# Patient Record
Sex: FEMALE | Race: WHITE | NOT HISPANIC OR LATINO | Employment: FULL TIME | URBAN - METROPOLITAN AREA
[De-identification: names, ages, dates, MRNs, and addresses within clinical notes are randomized per-mention and may not be internally consistent; named-entity substitution may affect disease eponyms.]

---

## 2022-04-18 ENCOUNTER — OFFICE VISIT (OUTPATIENT)
Dept: FAMILY MEDICINE CLINIC | Facility: CLINIC | Age: 22
End: 2022-04-18

## 2022-04-18 ENCOUNTER — LAB (OUTPATIENT)
Dept: LAB | Facility: HOSPITAL | Age: 22
End: 2022-04-18

## 2022-04-18 VITALS
HEART RATE: 114 BPM | SYSTOLIC BLOOD PRESSURE: 132 MMHG | HEIGHT: 64 IN | DIASTOLIC BLOOD PRESSURE: 84 MMHG | RESPIRATION RATE: 14 BRPM | OXYGEN SATURATION: 98 % | BODY MASS INDEX: 27.39 KG/M2 | WEIGHT: 160.4 LBS | TEMPERATURE: 98.4 F

## 2022-04-18 DIAGNOSIS — N92.6 ABNORMAL MENSES: ICD-10-CM

## 2022-04-18 DIAGNOSIS — Z00.00 ANNUAL PHYSICAL EXAM: Primary | ICD-10-CM

## 2022-04-18 DIAGNOSIS — R35.0 URINE FREQUENCY: ICD-10-CM

## 2022-04-18 DIAGNOSIS — D68.51 FACTOR 5 LEIDEN MUTATION, HETEROZYGOUS: ICD-10-CM

## 2022-04-18 LAB
25(OH)D3 SERPL-MCNC: 11.6 NG/ML (ref 30–100)
ALBUMIN SERPL-MCNC: 4.8 G/DL (ref 3.5–5.2)
ALBUMIN/GLOB SERPL: 1.7 G/DL
ALP SERPL-CCNC: 79 U/L (ref 39–117)
ALT SERPL W P-5'-P-CCNC: 18 U/L (ref 1–33)
ANION GAP SERPL CALCULATED.3IONS-SCNC: 13.5 MMOL/L (ref 5–15)
AST SERPL-CCNC: 23 U/L (ref 1–32)
B-HCG UR QL: NEGATIVE
BILIRUB BLD-MCNC: NEGATIVE MG/DL
BILIRUB SERPL-MCNC: 0.5 MG/DL (ref 0–1.2)
BUN SERPL-MCNC: 10 MG/DL (ref 6–20)
BUN/CREAT SERPL: 12.5 (ref 7–25)
CALCIUM SPEC-SCNC: 10 MG/DL (ref 8.6–10.5)
CHLORIDE SERPL-SCNC: 104 MMOL/L (ref 98–107)
CHOLEST SERPL-MCNC: 200 MG/DL (ref 0–200)
CLARITY, POC: CLEAR
CO2 SERPL-SCNC: 22.5 MMOL/L (ref 22–29)
COLOR UR: YELLOW
CREAT SERPL-MCNC: 0.8 MG/DL (ref 0.57–1)
DEPRECATED RDW RBC AUTO: 39.5 FL (ref 37–54)
EGFRCR SERPLBLD CKD-EPI 2021: 109 ML/MIN/1.73
ERYTHROCYTE [DISTWIDTH] IN BLOOD BY AUTOMATED COUNT: 12.2 % (ref 12.3–15.4)
EXPIRATION DATE: NORMAL
EXPIRATION DATE: NORMAL
GLOBULIN UR ELPH-MCNC: 2.9 GM/DL
GLUCOSE SERPL-MCNC: 90 MG/DL (ref 65–99)
GLUCOSE UR STRIP-MCNC: NEGATIVE MG/DL
HBA1C MFR BLD: 5 % (ref 4.8–5.6)
HCT VFR BLD AUTO: 44.4 % (ref 34–46.6)
HCV AB SER DONR QL: NORMAL
HDLC SERPL-MCNC: 49 MG/DL (ref 40–60)
HGB BLD-MCNC: 14.9 G/DL (ref 12–15.9)
HIV1+2 AB SER QL: NORMAL
INTERNAL NEGATIVE CONTROL: NORMAL
INTERNAL POSITIVE CONTROL: NORMAL
KETONES UR QL: NEGATIVE
LDLC SERPL CALC-MCNC: 133 MG/DL (ref 0–100)
LDLC/HDLC SERPL: 2.67 {RATIO}
LEUKOCYTE EST, POC: NEGATIVE
Lab: NORMAL
Lab: NORMAL
MCH RBC QN AUTO: 29.7 PG (ref 26.6–33)
MCHC RBC AUTO-ENTMCNC: 33.6 G/DL (ref 31.5–35.7)
MCV RBC AUTO: 88.6 FL (ref 79–97)
NITRITE UR-MCNC: NEGATIVE MG/ML
PH UR: 6 [PH] (ref 5–8)
PLATELET # BLD AUTO: 260 10*3/MM3 (ref 140–450)
PMV BLD AUTO: 10.2 FL (ref 6–12)
POTASSIUM SERPL-SCNC: 4.8 MMOL/L (ref 3.5–5.2)
PROT SERPL-MCNC: 7.7 G/DL (ref 6–8.5)
PROT UR STRIP-MCNC: NEGATIVE MG/DL
RBC # BLD AUTO: 5.01 10*6/MM3 (ref 3.77–5.28)
RBC # UR STRIP: NEGATIVE /UL
RPR SER QL: NORMAL
SODIUM SERPL-SCNC: 140 MMOL/L (ref 136–145)
SP GR UR: 1.03 (ref 1–1.03)
TRIGL SERPL-MCNC: 101 MG/DL (ref 0–150)
TSH SERPL DL<=0.05 MIU/L-ACNC: 2.83 UIU/ML (ref 0.27–4.2)
UROBILINOGEN UR QL: NORMAL
VIT B12 BLD-MCNC: 352 PG/ML (ref 211–946)
VLDLC SERPL-MCNC: 18 MG/DL (ref 5–40)
WBC NRBC COR # BLD: 6.22 10*3/MM3 (ref 3.4–10.8)

## 2022-04-18 PROCEDURE — 82306 VITAMIN D 25 HYDROXY: CPT

## 2022-04-18 PROCEDURE — 86706 HEP B SURFACE ANTIBODY: CPT

## 2022-04-18 PROCEDURE — 86592 SYPHILIS TEST NON-TREP QUAL: CPT

## 2022-04-18 PROCEDURE — 86704 HEP B CORE ANTIBODY TOTAL: CPT

## 2022-04-18 PROCEDURE — 82607 VITAMIN B-12: CPT

## 2022-04-18 PROCEDURE — 80074 ACUTE HEPATITIS PANEL: CPT

## 2022-04-18 PROCEDURE — G0432 EIA HIV-1/HIV-2 SCREEN: HCPCS

## 2022-04-18 PROCEDURE — 87350 HEPATITIS BE AG IA: CPT

## 2022-04-18 PROCEDURE — 99385 PREV VISIT NEW AGE 18-39: CPT | Performed by: STUDENT IN AN ORGANIZED HEALTH CARE EDUCATION/TRAINING PROGRAM

## 2022-04-18 PROCEDURE — 2014F MENTAL STATUS ASSESS: CPT | Performed by: STUDENT IN AN ORGANIZED HEALTH CARE EDUCATION/TRAINING PROGRAM

## 2022-04-18 PROCEDURE — 3008F BODY MASS INDEX DOCD: CPT | Performed by: STUDENT IN AN ORGANIZED HEALTH CARE EDUCATION/TRAINING PROGRAM

## 2022-04-18 PROCEDURE — 81025 URINE PREGNANCY TEST: CPT | Performed by: STUDENT IN AN ORGANIZED HEALTH CARE EDUCATION/TRAINING PROGRAM

## 2022-04-18 PROCEDURE — 84443 ASSAY THYROID STIM HORMONE: CPT

## 2022-04-18 PROCEDURE — 83036 HEMOGLOBIN GLYCOSYLATED A1C: CPT

## 2022-04-18 PROCEDURE — 80053 COMPREHEN METABOLIC PANEL: CPT

## 2022-04-18 PROCEDURE — 86707 HEPATITIS BE ANTIBODY: CPT

## 2022-04-18 PROCEDURE — 85027 COMPLETE CBC AUTOMATED: CPT

## 2022-04-18 PROCEDURE — 80061 LIPID PANEL: CPT

## 2022-04-18 RX ORDER — SERTRALINE HYDROCHLORIDE 25 MG/1
25 TABLET, FILM COATED ORAL DAILY
Qty: 30 TABLET | Refills: 1 | Status: SHIPPED | OUTPATIENT
Start: 2022-04-18 | End: 2022-07-08 | Stop reason: ALTCHOICE

## 2022-04-18 NOTE — ASSESSMENT & PLAN NOTE
Annual exam  Gardisil: she is unsure if she has had. She will let us know.   Std screening she agrees to.   She has had her covid vaccine.   Counseled on diet and exercise including limited sweets and sugars to focus on lean meat and vegetables. Counseled on 50 minutes of moderate exercise 3 times per week.   Recommend eye and dental exam.

## 2022-04-18 NOTE — PROGRESS NOTES
"     New Patient Office Visit      Patient Name: Huyen Interiano  : 2000   MRN: [id number]     Chief Complaint:  Menstrual Problem (Menstrual cycles are irregular. Last month is was a short cycle and light and this month was brown and light and spotting. Doesn't have a period every month. Periods have been abnormal since she started. Establishing care. ) and Annual Exam (Been fasting. Interested in doing blood work today. )     History of Present Illness:       Says she has a history of a clotting disorder. She says her mother is on blood thinner. Factor 5 heterozygous      She says last month her period was very light compared to normal as her periods are very heavy. She says this month her period was short as well. Last period started  on the . She did not have any cramping at the time. She says she doesn't think she could be pregnant. She says her period \"skips a lot\" sometimes going two months without it. Says sometimes she doesn't eat in the day and sometimes she She has a gyn apt .  She does have acne. No abnormal hair growth aside from belly hair which she states has \"always been present\".    Annual exam  Gardisil: she is unsure if she has had. She will let us know.   Std screening she agrees to.   She has had her covid vaccine.   Counseled on diet and exercise including limited sweets and sugars to focus on lean meat and vegetables. Counseled on 50 minutes of moderate exercise 3 times per week.   Recommend eye and dental exam.     Anxiety  No suicidal homicidal thoughts. Has had depression in high school but says this has greatly improved. She says she over thinks and has difficulty interacting with others. Eating habits are affected. Has a hard time falling asleep due to worry. Says she worries about everything. She has not been on anxiety medication in the past but alcira like to be.       Subjective        Past Medical History:   Diagnosis Date   • Blood disorder    • Depression  " "  • Menstrual abnormality        History reviewed. No pertinent surgical history.    Family History   Problem Relation Age of Onset   • Cancer Mother    • Diabetes Mother    • Depression Mother    • Seizures Mother    • Schizophrenia Father    • Schizophrenia Brother        Social History     Socioeconomic History   • Marital status: Single   Tobacco Use   • Smoking status: Never Smoker   • Smokeless tobacco: Never Used   Vaping Use   • Vaping Use: Never used   Substance and Sexual Activity   • Drug use: Never   • Sexual activity: Yes     Partners: Male     Birth control/protection: None        No current outpatient medications on file.    Allergies   Allergen Reactions   • Penicillins Hives       Objective     Physical Exam:  Vitals:    04/18/22 0951   BP: 132/84   Pulse: 114   Resp: 14   Temp: 98.4 °F (36.9 °C)   SpO2: 98%   Weight: 72.8 kg (160 lb 6.4 oz)   Height: 162.6 cm (64\")      Body mass index is 27.53 kg/m².     Physical Exam  Constitutional:       General: She is not in acute distress.     Appearance: Normal appearance.   HENT:      Head: Normocephalic and atraumatic.   Eyes:      Extraocular Movements: Extraocular movements intact.   Cardiovascular:      Rate and Rhythm: Normal rate and regular rhythm.      Heart sounds: No murmur heard.  Pulmonary:      Effort: Pulmonary effort is normal. No respiratory distress.      Breath sounds: Normal breath sounds.   Abdominal:      General: Abdomen is flat. Bowel sounds are normal.      Palpations: Abdomen is soft.      Tenderness: There is no abdominal tenderness. There is no guarding or rebound.   Genitourinary:     Comments: Pelvic exam done with kris teressa in the room. No rashes seen. Genitalia appears normal. Speculum exam done to visualize cervix which appears normal. Patient tolerated without issue. Oneswab obtained.   Musculoskeletal:         General: No swelling.      Cervical back: Normal range of motion.   Skin:     Findings: No rash.   Neurological: "      General: No focal deficit present.      Mental Status: She is alert.   Psychiatric:         Mood and Affect: Mood normal.              Assessment / Plan      Assessment/Plan:   Diagnoses and all orders for this visit:    1. Annual physical exam (Primary)  Assessment & Plan:    Annual exam  Gardisil: she is unsure if she has had. She will let us know.   Std screening she agrees to.   She has had her covid vaccine.   Counseled on diet and exercise including limited sweets and sugars to focus on lean meat and vegetables. Counseled on 50 minutes of moderate exercise 3 times per week.   Recommend eye and dental exam.   Counseled on barrier contraception use.         2. Abnormal menses  -     POC Pregnancy, Urine  -     OneSwab - Kit, Vagina; Future  -     CBC (No Diff); Future  -     Comprehensive Metabolic Panel; Future  -     Hemoglobin A1c; Future  -     TSH Rfx On Abnormal To Free T4; Future  -     Hepatitis C Antibody; Future  -     HIV-1 / O / 2 Ag / Antibody 4th Generation; Future  -     Vitamin B12; Future  -     Vitamin D 25 Hydroxy; Future  -     Lipid Panel; Future  -     Hepatitis B Virus Profile; Future  -     RPR; Future    3. Factor 5 Leiden mutation, heterozygous (HCC)  -     Ambulatory Referral to Hematology    4. Anxiety  Start zoloft.   Zoloft can be associated with a bleeding risk, risk of fractures in those >50 years old, low sodium, glaucoma, sexual dysfunction, suicidal thoughts and worsening depression. It cannot be stopped abruptly. Someone with history of qt prolongation,seizure disorder, alcoholism should not take. It should not be used in pregnancy. The patient understands these risks.          Return in about 1 month (around 5/18/2022).       Diana Dorado D.O.  [facility] [place]

## 2022-04-19 ENCOUNTER — DOCUMENTATION (OUTPATIENT)
Dept: FAMILY MEDICINE CLINIC | Facility: CLINIC | Age: 22
End: 2022-04-19

## 2022-04-19 DIAGNOSIS — F39 MOOD DISORDER: Primary | ICD-10-CM

## 2022-04-19 LAB
HBV CORE AB SERPL QL IA: NEGATIVE
HBV CORE IGM SERPL QL IA: NEGATIVE
HBV E AB SERPL QL IA: NEGATIVE
HBV E AG SERPL QL IA: NEGATIVE
HBV SURFACE AB SER QL: REACTIVE
HBV SURFACE AG SERPL QL IA: NEGATIVE

## 2022-04-19 RX ORDER — ERGOCALCIFEROL 1.25 MG/1
50000 CAPSULE ORAL WEEKLY
Qty: 8 CAPSULE | Refills: 0 | Status: SHIPPED | OUTPATIENT
Start: 2022-04-19 | End: 2022-07-08

## 2022-04-20 ENCOUNTER — CONSULT (OUTPATIENT)
Dept: ONCOLOGY | Facility: CLINIC | Age: 22
End: 2022-04-20

## 2022-04-20 VITALS
RESPIRATION RATE: 16 BRPM | WEIGHT: 163 LBS | BODY MASS INDEX: 27.83 KG/M2 | OXYGEN SATURATION: 98 % | SYSTOLIC BLOOD PRESSURE: 131 MMHG | HEIGHT: 64 IN | HEART RATE: 83 BPM | TEMPERATURE: 97.8 F | DIASTOLIC BLOOD PRESSURE: 87 MMHG

## 2022-04-20 DIAGNOSIS — D68.51 FACTOR V LEIDEN CARRIER: Primary | ICD-10-CM

## 2022-04-20 PROCEDURE — 99204 OFFICE O/P NEW MOD 45 MIN: CPT | Performed by: INTERNAL MEDICINE

## 2022-04-20 NOTE — PROGRESS NOTES
ID: 21 y.o. year old female from [facility] [id number]    PCP: Diana Dorado DO    REFERRING PHYSICIAN: Diana Dorado DO    Reason for Consultation: Factor V Leiden heterozygous carrier    Dear Dr. Dorado    It is a pleasure to meet Ms. Interiano today.  She is a very pleasant 21-year-old young lady who presents today for consultation because she is a factor V Leiden heterozygous carrier.  She was tested due to her mother having multiple clots and being on indefinite anticoagulation.  She otherwise seems to be in reasonable health.  She has some issues with her cycles.  But otherwise no chronic medical issues.  She is relatively active.  She has never had a thromboembolic event.      Past Medical History:   Diagnosis Date   • Blood disorder    • Depression    • Menstrual abnormality        History reviewed. No pertinent surgical history.    Social History     Socioeconomic History   • Marital status: Single   Tobacco Use   • Smoking status: Never Smoker   • Smokeless tobacco: Never Used   Vaping Use   • Vaping Use: Never used   Substance and Sexual Activity   • Drug use: Never   • Sexual activity: Yes     Partners: Male     Birth control/protection: None       Family History   Problem Relation Age of Onset   • Cancer Mother    • Diabetes Mother    • Depression Mother    • Seizures Mother    • Schizophrenia Father    • Schizophrenia Brother        Review of Systems:    16 point review of systems was performed and reviewed and scanned into the EMR    Review of Systems - Oncology      Current Outpatient Medications:   •  sertraline (Zoloft) 25 MG tablet, Take 1 tablet by mouth Daily., Disp: 30 tablet, Rfl: 1  •  vitamin D (ERGOCALCIFEROL) 1.25 MG (13467 UT) capsule capsule, Take 1 capsule by mouth 1 (One) Time Per Week., Disp: 8 capsule, Rfl: 0    Pain Medications             sertraline (Zoloft) 25 MG tablet Take 1 tablet by mouth Daily.           Allergies   Allergen Reactions   • Penicillins Hives         ECOG score: 0            Objective     Vitals:    04/20/22 1041   BP: 131/87   Pulse: 83   Resp: 16   Temp: 97.8 °F (36.6 °C)   SpO2: 98%     Body mass index is 27.98 kg/m².  Body surface area is 1.79 meters squared.        04/20/22  1041   Weight: 73.9 kg (163 lb)     Pain Score    04/20/22 1041   PainSc: 0-No pain          Physical Exam    General: well appearing, in no acute distress  HEENT: sclera anicteric, oropharynx clear, neck is supple  Lymphatics: no cervical, supraclavicular, or axillary adenopathy  Cardiovascular: regular rate and rhythm, no murmurs, rubs or gallops  Lungs: clear to auscultation bilaterally  Abdomen: soft, nontender, nondistended.  No palpable organomegaly  Extremities: no lower extremity edema  Skin: no rashes, lesions, bruising, or petechiae  Msk:  Shows no weakness of the large muscle groups  Psych: Mood is stable        Lab Results   Component Value Date    GLUCOSE 90 04/18/2022    BUN 10 04/18/2022    CREATININE 0.80 04/18/2022     04/18/2022    K 4.8 04/18/2022     04/18/2022    CO2 22.5 04/18/2022    CALCIUM 10.0 04/18/2022    PROTEINTOT 7.7 04/18/2022    ALBUMIN 4.80 04/18/2022    BILITOT 0.5 04/18/2022    ALKPHOS 79 04/18/2022    AST 23 04/18/2022    ALT 18 04/18/2022       Lab Results   Component Value Date    HGB 14.9 04/18/2022    HCT 44.4 04/18/2022    MCV 88.6 04/18/2022     04/18/2022    WBC 6.22 04/18/2022             Assessment/Plan      1.  Factor V Leiden heterozygous carrier.  Patients who are carriers generally do not require prophylactic anticoagulation in the setting of never having a thrombogenic event.  However there are times that we would consider routine thromboprophylaxis like with a severe acute medical illness or surgeries.  We also recommend nonestrogen containing birth control method.  IUDs are reasonable.  Occasionally I have placed patients on low-dose aspirin if they have other comorbidities such as obesity or chronic illnesses.  In this situation  she is a very healthy 21-year-old young lady and I do not see a need for that.        Thank you for allowing me to participate in the care of this patient.    Yours sincerely,    Nkechi Ramirez MD  [facility]  Hematology and Oncology         No orders of the defined types were placed in this encounter.

## 2022-04-21 ENCOUNTER — PATIENT ROUNDING (BHMG ONLY) (OUTPATIENT)
Dept: FAMILY MEDICINE CLINIC | Facility: CLINIC | Age: 22
End: 2022-04-21

## 2022-04-28 ENCOUNTER — OFFICE VISIT (OUTPATIENT)
Dept: FAMILY MEDICINE CLINIC | Facility: CLINIC | Age: 22
End: 2022-04-28

## 2022-04-28 ENCOUNTER — LAB (OUTPATIENT)
Dept: LAB | Facility: HOSPITAL | Age: 22
End: 2022-04-28

## 2022-04-28 VITALS
OXYGEN SATURATION: 99 % | RESPIRATION RATE: 18 BRPM | WEIGHT: 162 LBS | BODY MASS INDEX: 27.66 KG/M2 | HEART RATE: 90 BPM | HEIGHT: 64 IN | TEMPERATURE: 98.6 F | SYSTOLIC BLOOD PRESSURE: 120 MMHG | DIASTOLIC BLOOD PRESSURE: 80 MMHG

## 2022-04-28 DIAGNOSIS — Z32.00 POSSIBLE PREGNANCY: ICD-10-CM

## 2022-04-28 DIAGNOSIS — Z3A.01 LESS THAN 8 WEEKS GESTATION OF PREGNANCY: Primary | ICD-10-CM

## 2022-04-28 LAB
B-HCG UR QL: NEGATIVE
EXPIRATION DATE: NORMAL
HCG SERPL QL: NEGATIVE
INTERNAL NEGATIVE CONTROL: NORMAL
INTERNAL POSITIVE CONTROL: NORMAL
Lab: NORMAL

## 2022-04-28 PROCEDURE — 99214 OFFICE O/P EST MOD 30 MIN: CPT | Performed by: STUDENT IN AN ORGANIZED HEALTH CARE EDUCATION/TRAINING PROGRAM

## 2022-04-28 PROCEDURE — 84703 CHORIONIC GONADOTROPIN ASSAY: CPT | Performed by: STUDENT IN AN ORGANIZED HEALTH CARE EDUCATION/TRAINING PROGRAM

## 2022-04-28 PROCEDURE — 36415 COLL VENOUS BLD VENIPUNCTURE: CPT | Performed by: STUDENT IN AN ORGANIZED HEALTH CARE EDUCATION/TRAINING PROGRAM

## 2022-04-28 PROCEDURE — 81025 URINE PREGNANCY TEST: CPT | Performed by: STUDENT IN AN ORGANIZED HEALTH CARE EDUCATION/TRAINING PROGRAM

## 2022-04-28 NOTE — PROGRESS NOTES
"Chief Complaint  Possible Pregnancy    History of Present Illness    She says on the 3rd of this month she had spotting and has not had period since then. She says at home test appeared positive. Her apt with obgyn is on the 6th. She denies any fever belly pain. Using condoms inconsistently, says she is unsure if she wants to be pregnant.     She says she has noticed zoloft has helped. She has apt with psychiatry. No side effects.    She has started vitamin d.       The following portions of the patient's history were reviewed and updated as appropriate: allergies, current medications, past family history, past medical history, past social history, past surgical history, and problem list.    OBJECTIVE:  /80   Pulse 90   Temp 98.6 °F (37 °C)   Resp 18   Ht 162.6 cm (64\")   Wt 73.5 kg (162 lb)   SpO2 99%   BMI 27.81 kg/m²       Physical Exam  Constitutional:       General: She is not in acute distress.     Appearance: Normal appearance.   HENT:      Head: Normocephalic and atraumatic.   Eyes:      Extraocular Movements: Extraocular movements intact.   Cardiovascular:      Rate and Rhythm: Normal rate and regular rhythm.      Heart sounds: No murmur heard.  Pulmonary:      Effort: Pulmonary effort is normal. No respiratory distress.      Breath sounds: Normal breath sounds.   Abdominal:      General: Abdomen is flat. Bowel sounds are normal. There is no distension.      Palpations: Abdomen is soft.      Tenderness: There is no abdominal tenderness. There is no guarding or rebound.   Musculoskeletal:         General: No swelling.      Cervical back: Normal range of motion.   Skin:     Findings: No rash.   Neurological:      General: No focal deficit present.      Mental Status: She is alert.   Psychiatric:         Mood and Affect: Mood normal.                    Assessment and Plan   Diagnoses and all orders for this visit:    1 Possible pregnancy  2 anxiety  -     hCG, Serum, Qualitative; Future  - Advised " consistent condom use to avoid pregnancy if unwanted and to discuss non hormonal birth control with gyn if pregnancy test negative.   - Advised daily prenatal vitamin  - Discussed zoloft in pregnancy can be associated with adverse fetal outcomes such as baby irritability, gi upset, at worse seizures. Maternal anxiety/depression can also be associated with adverse fetal outcomes. She would like to stop zoloft if blood pregnancy test positive . Urine hcg negative in office today.           Return for Next scheduled follow up.       Diana Dorado D.O.  [facility] [place]

## 2022-04-29 ENCOUNTER — TELEPHONE (OUTPATIENT)
Dept: FAMILY MEDICINE CLINIC | Facility: CLINIC | Age: 22
End: 2022-04-29

## 2022-04-29 NOTE — TELEPHONE ENCOUNTER
Patient called once more returning a missed call, specified that the lab results she wanted to discuss was the Pregnancy Blood test. Please advise.

## 2022-06-13 RX ORDER — ERGOCALCIFEROL 1.25 MG/1
CAPSULE ORAL
Qty: 8 CAPSULE | Refills: 0 | OUTPATIENT
Start: 2022-06-13

## 2022-06-13 NOTE — TELEPHONE ENCOUNTER
Rx Refill Note  Requested Prescriptions     Pending Prescriptions Disp Refills   • vitamin D (ERGOCALCIFEROL) 1.25 MG (70263 UT) capsule capsule [Pharmacy Med Name: VITAMIN D2 50,000IU (ERGO) CAP RX] 8 capsule 0     Sig: TAKE 1 CAPSULE BY MOUTH 1 TIME EVERY WEEK      Last office visit with prescribing clinician: 4/28/2022      Next office visit with prescribing clinician: Visit date not found            ALEXEY SEPULVEDA MA  06/13/22, 09:44 EDT

## 2022-07-05 ENCOUNTER — HOSPITAL ENCOUNTER (EMERGENCY)
Facility: HOSPITAL | Age: 22
Discharge: HOME | End: 2022-07-05
Attending: EMERGENCY MEDICINE | Admitting: EMERGENCY MEDICINE

## 2022-07-05 ENCOUNTER — TELEPHONE (OUTPATIENT)
Dept: FAMILY MEDICINE CLINIC | Facility: CLINIC | Age: 22
End: 2022-07-05

## 2022-07-05 ENCOUNTER — APPOINTMENT (OUTPATIENT)
Dept: GENERAL RADIOLOGY | Facility: HOSPITAL | Age: 22
End: 2022-07-05

## 2022-07-05 ENCOUNTER — APPOINTMENT (OUTPATIENT)
Dept: CT IMAGING | Facility: HOSPITAL | Age: 22
End: 2022-07-05

## 2022-07-05 VITALS
WEIGHT: 150 LBS | BODY MASS INDEX: 24.99 KG/M2 | HEIGHT: 65 IN | RESPIRATION RATE: 16 BRPM | SYSTOLIC BLOOD PRESSURE: 132 MMHG | TEMPERATURE: 98.7 F | DIASTOLIC BLOOD PRESSURE: 80 MMHG | OXYGEN SATURATION: 100 % | HEART RATE: 93 BPM

## 2022-07-05 DIAGNOSIS — R07.9 CHEST PAIN, UNSPECIFIED TYPE: ICD-10-CM

## 2022-07-05 DIAGNOSIS — R10.84 GENERALIZED ABDOMINAL PAIN: Primary | ICD-10-CM

## 2022-07-05 DIAGNOSIS — K21.9 GASTROESOPHAGEAL REFLUX DISEASE WITHOUT ESOPHAGITIS: ICD-10-CM

## 2022-07-05 DIAGNOSIS — K92.1 MELENA: ICD-10-CM

## 2022-07-05 LAB
ALBUMIN SERPL-MCNC: 4.6 G/DL (ref 3.5–5.2)
ALBUMIN/GLOB SERPL: 1.7 G/DL
ALP SERPL-CCNC: 85 U/L (ref 39–117)
ALT SERPL W P-5'-P-CCNC: 15 U/L (ref 1–33)
ANION GAP SERPL CALCULATED.3IONS-SCNC: 12 MMOL/L (ref 5–15)
AST SERPL-CCNC: 25 U/L (ref 1–32)
B-HCG UR QL: NEGATIVE
BACTERIA UR QL AUTO: ABNORMAL /HPF
BASOPHILS # BLD AUTO: 0.02 10*3/MM3 (ref 0–0.2)
BASOPHILS NFR BLD AUTO: 0.3 % (ref 0–1.5)
BILIRUB SERPL-MCNC: 0.5 MG/DL (ref 0–1.2)
BILIRUB UR QL STRIP: NEGATIVE
BUN SERPL-MCNC: 8 MG/DL (ref 6–20)
BUN/CREAT SERPL: 11.4 (ref 7–25)
CALCIUM SPEC-SCNC: 9.3 MG/DL (ref 8.6–10.5)
CHLORIDE SERPL-SCNC: 105 MMOL/L (ref 98–107)
CLARITY UR: ABNORMAL
CO2 SERPL-SCNC: 25 MMOL/L (ref 22–29)
COLOR UR: YELLOW
CREAT SERPL-MCNC: 0.7 MG/DL (ref 0.57–1)
DEPRECATED RDW RBC AUTO: 38.6 FL (ref 37–54)
EGFRCR SERPLBLD CKD-EPI 2021: 127.2 ML/MIN/1.73
EOSINOPHIL # BLD AUTO: 0.08 10*3/MM3 (ref 0–0.4)
EOSINOPHIL NFR BLD AUTO: 1 % (ref 0.3–6.2)
ERYTHROCYTE [DISTWIDTH] IN BLOOD BY AUTOMATED COUNT: 12 % (ref 12.3–15.4)
EXPIRATION DATE: NORMAL
GLOBULIN UR ELPH-MCNC: 2.7 GM/DL
GLUCOSE SERPL-MCNC: 78 MG/DL (ref 65–99)
GLUCOSE UR STRIP-MCNC: NEGATIVE MG/DL
HCT VFR BLD AUTO: 41.8 % (ref 34–46.6)
HGB BLD-MCNC: 14.3 G/DL (ref 12–15.9)
HGB UR QL STRIP.AUTO: NEGATIVE
HOLD SPECIMEN: NORMAL
HYALINE CASTS UR QL AUTO: ABNORMAL /LPF
IMM GRANULOCYTES # BLD AUTO: 0.02 10*3/MM3 (ref 0–0.05)
IMM GRANULOCYTES NFR BLD AUTO: 0.3 % (ref 0–0.5)
INTERNAL NEGATIVE CONTROL: NEGATIVE
INTERNAL POSITIVE CONTROL: POSITIVE
KETONES UR QL STRIP: ABNORMAL
LEUKOCYTE ESTERASE UR QL STRIP.AUTO: NEGATIVE
LIPASE SERPL-CCNC: 31 U/L (ref 13–60)
LYMPHOCYTES # BLD AUTO: 2.84 10*3/MM3 (ref 0.7–3.1)
LYMPHOCYTES NFR BLD AUTO: 36.2 % (ref 19.6–45.3)
Lab: NORMAL
MCH RBC QN AUTO: 29.9 PG (ref 26.6–33)
MCHC RBC AUTO-ENTMCNC: 34.2 G/DL (ref 31.5–35.7)
MCV RBC AUTO: 87.4 FL (ref 79–97)
MONOCYTES # BLD AUTO: 0.66 10*3/MM3 (ref 0.1–0.9)
MONOCYTES NFR BLD AUTO: 8.4 % (ref 5–12)
NEUTROPHILS NFR BLD AUTO: 4.22 10*3/MM3 (ref 1.7–7)
NEUTROPHILS NFR BLD AUTO: 53.8 % (ref 42.7–76)
NITRITE UR QL STRIP: NEGATIVE
NRBC BLD AUTO-RTO: 0 /100 WBC (ref 0–0.2)
PH UR STRIP.AUTO: <=5 [PH] (ref 5–8)
PLATELET # BLD AUTO: 223 10*3/MM3 (ref 140–450)
PMV BLD AUTO: 9.8 FL (ref 6–12)
POTASSIUM SERPL-SCNC: 4 MMOL/L (ref 3.5–5.2)
PROT SERPL-MCNC: 7.3 G/DL (ref 6–8.5)
PROT UR QL STRIP: NEGATIVE
RBC # BLD AUTO: 4.78 10*6/MM3 (ref 3.77–5.28)
RBC # UR STRIP: ABNORMAL /HPF
REF LAB TEST METHOD: ABNORMAL
SODIUM SERPL-SCNC: 142 MMOL/L (ref 136–145)
SP GR UR STRIP: 1.03 (ref 1–1.03)
SQUAMOUS #/AREA URNS HPF: ABNORMAL /HPF
TROPONIN T SERPL-MCNC: <0.01 NG/ML (ref 0–0.03)
UROBILINOGEN UR QL STRIP: ABNORMAL
WBC # UR STRIP: ABNORMAL /HPF
WBC NRBC COR # BLD: 7.84 10*3/MM3 (ref 3.4–10.8)
WHOLE BLOOD HOLD COAG: NORMAL
WHOLE BLOOD HOLD SPECIMEN: NORMAL

## 2022-07-05 PROCEDURE — 81025 URINE PREGNANCY TEST: CPT

## 2022-07-05 PROCEDURE — 83690 ASSAY OF LIPASE: CPT

## 2022-07-05 PROCEDURE — 25010000002 ONDANSETRON PER 1 MG: Performed by: PHYSICIAN ASSISTANT

## 2022-07-05 PROCEDURE — 93005 ELECTROCARDIOGRAM TRACING: CPT | Performed by: PHYSICIAN ASSISTANT

## 2022-07-05 PROCEDURE — 71045 X-RAY EXAM CHEST 1 VIEW: CPT

## 2022-07-05 PROCEDURE — 74177 CT ABD & PELVIS W/CONTRAST: CPT

## 2022-07-05 PROCEDURE — 25010000002 IOPAMIDOL 61 % SOLUTION: Performed by: EMERGENCY MEDICINE

## 2022-07-05 PROCEDURE — 96374 THER/PROPH/DIAG INJ IV PUSH: CPT

## 2022-07-05 PROCEDURE — 80053 COMPREHEN METABOLIC PANEL: CPT

## 2022-07-05 PROCEDURE — 99283 EMERGENCY DEPT VISIT LOW MDM: CPT

## 2022-07-05 PROCEDURE — 81025 URINE PREGNANCY TEST: CPT | Performed by: EMERGENCY MEDICINE

## 2022-07-05 PROCEDURE — 84484 ASSAY OF TROPONIN QUANT: CPT | Performed by: PHYSICIAN ASSISTANT

## 2022-07-05 PROCEDURE — 81001 URINALYSIS AUTO W/SCOPE: CPT | Performed by: EMERGENCY MEDICINE

## 2022-07-05 PROCEDURE — 85025 COMPLETE CBC W/AUTO DIFF WBC: CPT

## 2022-07-05 RX ORDER — OMEPRAZOLE 20 MG/1
20 CAPSULE, DELAYED RELEASE ORAL DAILY
Qty: 14 CAPSULE | Refills: 0 | Status: SHIPPED | OUTPATIENT
Start: 2022-07-05 | End: 2023-01-16

## 2022-07-05 RX ORDER — ONDANSETRON 2 MG/ML
4 INJECTION INTRAMUSCULAR; INTRAVENOUS ONCE
Status: COMPLETED | OUTPATIENT
Start: 2022-07-05 | End: 2022-07-05

## 2022-07-05 RX ORDER — SODIUM CHLORIDE 9 MG/ML
10 INJECTION INTRAVENOUS AS NEEDED
Status: DISCONTINUED | OUTPATIENT
Start: 2022-07-05 | End: 2022-07-05 | Stop reason: HOSPADM

## 2022-07-05 RX ADMIN — SODIUM CHLORIDE 1000 ML: 9 INJECTION, SOLUTION INTRAVENOUS at 17:34

## 2022-07-05 RX ADMIN — ONDANSETRON 4 MG: 2 INJECTION INTRAMUSCULAR; INTRAVENOUS at 17:33

## 2022-07-05 RX ADMIN — IOPAMIDOL 90 ML: 612 INJECTION, SOLUTION INTRAVENOUS at 18:08

## 2022-07-05 NOTE — ED PROVIDER NOTES
Subjective   Ms. Schultz is a 22-year-old female who presents to the emergency department with complaints of diffuse abdominal pain for the past 5 days.  She has also had some intermittent midsternal chest pain off and on for about the past 3 days.  She had an episode of black stool 3 days ago that has since resolved.  No associated fever.  She has had some nausea without vomiting.  She has had diarrhea 2-3 times a day for the last few days.  No bloody stools.  The patient was seen at urgent care 3 days ago with sore throat and some ear pressure.  She was negative for COVID at that time.  Past medical history of factor V coagulopathy (not on any blood thinners).  No other known health issues.  She has had no previous surgeries.          Review of Systems   Constitutional: Negative for chills and fever.   HENT: Negative for sore throat.    Respiratory: Negative for cough and shortness of breath.    Cardiovascular: Negative for chest pain.   Gastrointestinal: Positive for blood in stool (black stool 3 days ago, now resolved), diarrhea and nausea. Negative for abdominal pain and vomiting.   Genitourinary: Negative for dysuria.   Musculoskeletal: Negative for back pain.   Skin: Negative for pallor and rash.   Allergic/Immunologic: Negative for immunocompromised state.   Neurological: Negative for weakness, light-headedness, numbness and headaches.   Hematological: Negative for adenopathy. Does not bruise/bleed easily.   Psychiatric/Behavioral: Negative for confusion.       Past Medical History:   Diagnosis Date   • Blood disorder    • Depression    • Menstrual abnormality        Allergies   Allergen Reactions   • Penicillins Hives       History reviewed. No pertinent surgical history.    Family History   Problem Relation Age of Onset   • Cancer Mother    • Diabetes Mother    • Depression Mother    • Seizures Mother    • Schizophrenia Father    • Schizophrenia Brother        Social History     Socioeconomic History   •  Marital status: Single   Tobacco Use   • Smoking status: Never Smoker   • Smokeless tobacco: Never Used   Vaping Use   • Vaping Use: Never used   Substance and Sexual Activity   • Alcohol use: Never   • Drug use: Never   • Sexual activity: Yes     Partners: Male     Birth control/protection: None           Objective   Physical Exam  Constitutional:       General: She is not in acute distress.     Appearance: She is well-developed.   HENT:      Head: Normocephalic.      Nose: Nose normal.      Mouth/Throat:      Mouth: Mucous membranes are moist.   Eyes:      Extraocular Movements: Extraocular movements intact.      Conjunctiva/sclera: Conjunctivae normal.      Pupils: Pupils are equal, round, and reactive to light.   Cardiovascular:      Rate and Rhythm: Normal rate and regular rhythm.      Heart sounds: Normal heart sounds.   Pulmonary:      Effort: Pulmonary effort is normal.      Breath sounds: Normal breath sounds. No wheezing, rhonchi or rales.   Abdominal:      General: Bowel sounds are normal.      Palpations: Abdomen is soft.      Tenderness: There is abdominal tenderness (mild, diffuse).   Musculoskeletal:         General: No tenderness. Normal range of motion.      Cervical back: Normal range of motion and neck supple.      Right lower leg: No edema.      Left lower leg: No edema.   Skin:     General: Skin is warm and dry.      Findings: No rash.   Neurological:      General: No focal deficit present.      Mental Status: She is alert and oriented to person, place, and time.   Psychiatric:         Mood and Affect: Mood normal.         Procedures           ED Course      The patient appears in no acute distress.  Labs and urinalysis were obtained.  She was sent for a CT scan of the abdomen pelvis as well as a chest x-ray.    EKG was obtained due to her intermittent chest pain.  EKG shows a IRBBB with a rate of 75 bpm.  No ischemic changes are seen.    I will plan to treat the patient with some omeprazole for  GERD like symptoms.  She will be advised to follow-up with her PCP or return to the emergency department if acutely worse.                                     MDM    Final diagnoses:   Generalized abdominal pain   Chest pain, unspecified type   Gastroesophageal reflux disease without esophagitis   Melena       ED Disposition  ED Disposition     ED Disposition   Discharge    Condition   Stable    Comment   --             Diana Dorado DO  [address]  [place] [id number]  [phone]      call for follow up    [facility]  [address] [facility] [facility] [id number] [phone]    If symptoms worsen    Viral Baptiste MD  [address]  [place] [address]  [phone]      call tomorrow to schedule a follow up         Medication List      New Prescriptions    omeprazole 20 MG capsule  Commonly known as: priLOSEC  Take 1 capsule by mouth Daily.           Where to Get Your Medications      These medications were sent to [id number] - [place], [place] - [address] [facility] [address] [facility] - [phone]  - [phone]   [address] [facility] [address]    Phone: [phone]   · omeprazole 20 MG capsule          Dejan Shaffer PA  07/05/22 4302

## 2022-07-05 NOTE — TELEPHONE ENCOUNTER
LVM for pt to call back regarding appt.     Macey would recommend pt go to the ER for evaluation. The testing she will need done will not come in quick enough, if pt has a stomach bleed.

## 2022-07-05 NOTE — DISCHARGE INSTRUCTIONS
Rest.  Omeprazole as prescribed.  Zofran as prescribed for nausea.  Call your PCP for follow-up.  Return to the emergency department if acutely worse.  Call Dr. Baptiste (gastroenterologist) for follow-up as well.

## 2022-07-06 LAB
QT INTERVAL: 388 MS
QTC INTERVAL: 433 MS

## 2022-07-08 ENCOUNTER — OFFICE VISIT (OUTPATIENT)
Dept: FAMILY MEDICINE CLINIC | Facility: CLINIC | Age: 22
End: 2022-07-08

## 2022-07-08 VITALS
DIASTOLIC BLOOD PRESSURE: 88 MMHG | HEART RATE: 92 BPM | WEIGHT: 158.6 LBS | TEMPERATURE: 98.6 F | HEIGHT: 65 IN | SYSTOLIC BLOOD PRESSURE: 130 MMHG | RESPIRATION RATE: 16 BRPM | OXYGEN SATURATION: 97 % | BODY MASS INDEX: 26.42 KG/M2

## 2022-07-08 DIAGNOSIS — K92.1 BLOODY STOOL: Primary | ICD-10-CM

## 2022-07-08 DIAGNOSIS — K92.1 BLACK STOOL: ICD-10-CM

## 2022-07-08 DIAGNOSIS — R10.13 EPIGASTRIC PAIN: ICD-10-CM

## 2022-07-08 PROBLEM — D68.51 FACTOR V LEIDEN MUTATION: Status: ACTIVE | Noted: 2022-03-28

## 2022-07-08 PROCEDURE — 99214 OFFICE O/P EST MOD 30 MIN: CPT | Performed by: STUDENT IN AN ORGANIZED HEALTH CARE EDUCATION/TRAINING PROGRAM

## 2022-07-08 RX ORDER — AZITHROMYCIN 250 MG/1
TABLET, FILM COATED ORAL
COMMUNITY
Start: 2022-07-03 | End: 2022-07-08

## 2022-07-08 RX ORDER — CITALOPRAM 10 MG/1
10 TABLET ORAL DAILY
Qty: 30 TABLET | Refills: 1 | Status: SHIPPED | OUTPATIENT
Start: 2022-07-08 | End: 2023-01-16 | Stop reason: SDUPTHER

## 2022-07-08 RX ORDER — ONDANSETRON 4 MG/1
TABLET, FILM COATED ORAL
COMMUNITY
Start: 2022-07-03 | End: 2022-07-08

## 2022-07-08 RX ORDER — LORATADINE, PSEUDOEPHEDRINE SULFATE 5; 120 MG/1; MG/1
TABLET, FILM COATED, EXTENDED RELEASE ORAL
COMMUNITY
Start: 2022-07-03 | End: 2022-07-08
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
## 2022-07-19 ENCOUNTER — OFFICE VISIT (OUTPATIENT)
Dept: FAMILY MEDICINE CLINIC | Facility: CLINIC | Age: 22
End: 2022-07-19

## 2022-07-19 VITALS
SYSTOLIC BLOOD PRESSURE: 114 MMHG | TEMPERATURE: 98 F | BODY MASS INDEX: 25.83 KG/M2 | WEIGHT: 155 LBS | DIASTOLIC BLOOD PRESSURE: 76 MMHG | OXYGEN SATURATION: 98 % | HEIGHT: 65 IN | HEART RATE: 88 BPM | RESPIRATION RATE: 20 BRPM

## 2022-07-19 DIAGNOSIS — S69.91XA HAND INJURY, RIGHT, INITIAL ENCOUNTER: Primary | ICD-10-CM

## 2022-07-19 PROCEDURE — 99213 OFFICE O/P EST LOW 20 MIN: CPT | Performed by: NURSE PRACTITIONER

## 2022-07-25 DIAGNOSIS — Z01.812 ENCOUNTER FOR PREPROCEDURE SCREENING LABORATORY TESTING FOR COVID-19: Primary | ICD-10-CM

## 2022-07-25 DIAGNOSIS — Z20.822 ENCOUNTER FOR PREPROCEDURE SCREENING LABORATORY TESTING FOR COVID-19: Primary | ICD-10-CM

## 2022-07-28 PROBLEM — S69.91XA HAND INJURY, RIGHT, INITIAL ENCOUNTER: Status: ACTIVE | Noted: 2022-07-28

## 2022-07-28 NOTE — PROGRESS NOTES
"Chief Complaint  Hand Injury    Subjective        Huyen Interiano presents to [facility]  Patient presents to clinic with complaints of left hand pain and swelling due to punching object a few days ago. She admits being able to move and bend fingers.     Hand Injury   The incident occurred 2 days ago. The injury mechanism was a direct blow. The pain is present in the left fingers and left hand. The quality of the pain is described as aching. The pain does not radiate. The pain has been improving since the incident. Pertinent negatives include no chest pain, muscle weakness, numbness or tingling. The symptoms are aggravated by movement, lifting and palpation. She has tried rest for the symptoms. The treatment provided no relief.       Objective   Vital Signs:  /76   Pulse 88   Temp 98 °F (36.7 °C)   Resp 20   Ht 165.1 cm (65\")   Wt 70.3 kg (155 lb)   SpO2 98%   BMI 25.79 kg/m²   Estimated body mass index is 25.79 kg/m² as calculated from the following:    Height as of this encounter: 165.1 cm (65\").    Weight as of this encounter: 70.3 kg (155 lb).          Physical Exam  Vitals and nursing note reviewed.   Constitutional:       General: She is not in acute distress.     Appearance: Normal appearance.   HENT:      Head: Normocephalic.      Right Ear: External ear normal.      Left Ear: External ear normal.      Nose: Nose normal.   Eyes:      Extraocular Movements: Extraocular movements intact.      Conjunctiva/sclera: Conjunctivae normal.      Pupils: Pupils are equal, round, and reactive to light.   Cardiovascular:      Rate and Rhythm: Normal rate and regular rhythm.      Heart sounds: Normal heart sounds.   Pulmonary:      Effort: Pulmonary effort is normal.      Breath sounds: Normal breath sounds.   Musculoskeletal:         General: Swelling and tenderness present.      Left hand: Swelling and tenderness present. Normal strength. Normal sensation. Normal pulse.      " Right lower leg: No edema.      Left lower leg: No edema.   Skin:     General: Skin is warm and dry.      Findings: Bruising present.   Neurological:      Mental Status: She is alert and oriented to person, place, and time.   Psychiatric:         Mood and Affect: Mood normal.         Behavior: Behavior normal.         Thought Content: Thought content normal.         Judgment: Judgment normal.        Result Review :      Data reviewed: Radiologic studies x-ray left hand          Assessment and Plan   Diagnoses and all orders for this visit:    1. Hand injury, right, initial encounter (Primary)  Assessment & Plan:  Ice/heat PRN  Tylenol/Motrin PRN  X-ray today  Elevate on pillow  RTO or call PRN    Orders:  -     XR Hand 3+ View Right (In Office)           Follow Up   Return in about 2 weeks (around 8/2/2022), or if symptoms worsen or fail to improve, for Next scheduled follow up with PCP anxiety.  Patient was given instructions and counseling regarding her condition or for health maintenance advice. Please see specific information pulled into the AVS if appropriate.

## 2022-08-02 ENCOUNTER — APPOINTMENT (OUTPATIENT)
Dept: PREADMISSION TESTING | Facility: HOSPITAL | Age: 22
End: 2022-08-02

## 2022-08-02 LAB — SARS-COV-2 RNA PNL SPEC NAA+PROBE: NOT DETECTED

## 2022-08-02 PROCEDURE — U0004 COV-19 TEST NON-CDC HGH THRU: HCPCS

## 2022-08-02 PROCEDURE — C9803 HOPD COVID-19 SPEC COLLECT: HCPCS

## 2022-08-04 ENCOUNTER — TELEPHONE (OUTPATIENT)
Dept: FAMILY MEDICINE CLINIC | Facility: CLINIC | Age: 22
End: 2022-08-04

## 2022-08-05 ENCOUNTER — OUTSIDE FACILITY SERVICE (OUTPATIENT)
Dept: GASTROENTEROLOGY | Facility: CLINIC | Age: 22
End: 2022-08-05

## 2022-08-05 PROCEDURE — 88305 TISSUE EXAM BY PATHOLOGIST: CPT | Performed by: INTERNAL MEDICINE

## 2022-08-05 PROCEDURE — 43239 EGD BIOPSY SINGLE/MULTIPLE: CPT | Performed by: INTERNAL MEDICINE

## 2022-08-06 ENCOUNTER — LAB REQUISITION (OUTPATIENT)
Dept: LAB | Facility: HOSPITAL | Age: 22
End: 2022-08-06

## 2022-08-06 DIAGNOSIS — K29.70 GASTRITIS, UNSPECIFIED, WITHOUT BLEEDING: ICD-10-CM

## 2022-08-09 LAB
CYTO UR: NORMAL
LAB AP CASE REPORT: NORMAL
LAB AP CLINICAL INFORMATION: NORMAL
PATH REPORT.FINAL DX SPEC: NORMAL
PATH REPORT.GROSS SPEC: NORMAL

## 2023-01-16 ENCOUNTER — TELEPHONE (OUTPATIENT)
Dept: FAMILY MEDICINE CLINIC | Facility: CLINIC | Age: 23
End: 2023-01-16

## 2023-01-16 ENCOUNTER — OFFICE VISIT (OUTPATIENT)
Dept: FAMILY MEDICINE CLINIC | Facility: CLINIC | Age: 23
End: 2023-01-16
Payer: MEDICAID

## 2023-01-16 VITALS
HEART RATE: 83 BPM | BODY MASS INDEX: 25.69 KG/M2 | HEIGHT: 65 IN | OXYGEN SATURATION: 98 % | TEMPERATURE: 98.2 F | SYSTOLIC BLOOD PRESSURE: 138 MMHG | WEIGHT: 154.2 LBS | DIASTOLIC BLOOD PRESSURE: 86 MMHG | RESPIRATION RATE: 14 BRPM

## 2023-01-16 DIAGNOSIS — R07.9 CHEST PAIN, UNSPECIFIED TYPE: Primary | ICD-10-CM

## 2023-01-16 DIAGNOSIS — R07.89 CHEST WALL PAIN: ICD-10-CM

## 2023-01-16 PROCEDURE — 93000 ELECTROCARDIOGRAM COMPLETE: CPT | Performed by: STUDENT IN AN ORGANIZED HEALTH CARE EDUCATION/TRAINING PROGRAM

## 2023-01-16 PROCEDURE — 99214 OFFICE O/P EST MOD 30 MIN: CPT | Performed by: STUDENT IN AN ORGANIZED HEALTH CARE EDUCATION/TRAINING PROGRAM

## 2023-01-16 RX ORDER — PANTOPRAZOLE SODIUM 40 MG/1
40 TABLET, DELAYED RELEASE ORAL 2 TIMES DAILY
Qty: 60 TABLET | Refills: 1 | Status: SHIPPED | OUTPATIENT
Start: 2023-01-16 | End: 2023-03-03 | Stop reason: ALTCHOICE

## 2023-01-16 RX ORDER — CITALOPRAM 10 MG/1
TABLET ORAL
Qty: 901 TABLET | Refills: 2 | Status: SHIPPED | OUTPATIENT
Start: 2023-01-16

## 2023-01-16 NOTE — TELEPHONE ENCOUNTER
Mame called needing clarification on the Pt Citalopram. The prescription states to take one tablet a day and then switch to two tablets a day but does not clarify when to make the switch. They also want to clarify the quantity since the quantity on the prescription is 901.

## 2023-01-16 NOTE — PROGRESS NOTES
"Chief Complaint  Chest Pain (Left side, get put back on anxiety meds)    History of Present Illness     Chest pain  She was seen in the er and diagnosed with gerd . She was given ppi but it did not help.   This has been going on for months. No palpitations. No syncope or presyncope. No fevers.   The pain is worse with laying down \"like someone is sitting on me\".   The pain is daily.   The pain is not positional.  She denies any feeling of food coming up , heartburn or other symptoms.    She says the anxiety was improved with ssri but she ran out months ago and stopped taking.      The following portions of the patient's history were reviewed and updated as appropriate: allergies, current medications, past family history, past medical history, past social history, past surgical history, and problem list.    OBJECTIVE:  /86   Pulse 83   Temp 98.2 °F (36.8 °C)   Resp 14   Ht 165.1 cm (65\")   Wt 69.9 kg (154 lb 3.2 oz)   SpO2 98%   BMI 25.66 kg/m²       Physical Exam  Constitutional:       General: She is not in acute distress.     Appearance: Normal appearance.   HENT:      Head: Normocephalic and atraumatic.   Eyes:      Extraocular Movements: Extraocular movements intact.   Cardiovascular:      Rate and Rhythm: Normal rate and regular rhythm.      Heart sounds: No murmur heard.  Pulmonary:      Effort: Pulmonary effort is normal. No respiratory distress.      Breath sounds: Normal breath sounds. No stridor. No wheezing, rhonchi or rales.   Chest:          Comments: There is pain on palpation of left side of upper chest wall  Skin:     Findings: No rash.   Neurological:      General: No focal deficit present.      Mental Status: She is alert.   Psychiatric:         Mood and Affect: Mood normal.              ECG 12 Lead    Date/Time: 1/16/2023 8:43 AM  Performed by: Diana Dorado DO  Authorized by: Diana Dorado DO   Previous ECG: no previous ECG available  Rhythm: sinus rhythm  Rate: " normal  Conduction: conduction normal  ST Segments: ST segments normal  T Waves: T waves normal  QRS axis: normal  Other: no other findings    Clinical impression: normal ECG               Assessment and Plan   Diagnoses and all orders for this visit:    1. Chest pain, unspecified type (Primary)  -     ECG 12 Lead  -     Adult Transthoracic Echo Complete W/ Cont if Necessary Per Protocol; Future    2. Chest wall pain  -     Ambulatory Referral to Physical Therapy Evaluate and treat    Other orders  -     citalopram (CeleXA) 10 MG tablet; Take one tab oral by mouth daily followed by two tabs oral daily thereafter.  Dispense: 901 tablet; Refill: 2  -     pantoprazole (Protonix) 40 MG EC tablet; Take 1 tablet by mouth 2 (Two) Times a Day.  Dispense: 60 tablet; Refill: 1        EKG, chest xray have all been non concerning. Given some chest pain with lying flat I am concerned for gerd. Will increase ppi to twice daily. Will also check echo to rule out underlying heart issue.     There is definite pain on palpation of chest wall. Advised tylenol and will refer to PT.     Call in celexa and taper back up to 20 mg as she felt this dose was more helpful.   Discussed that this medication can be associated with a bleeding risk, risk of fractures in those >50 years old, low sodium, glaucoma, sexual dysfunction, suicidal thoughts and worsening depression. It cannot be stopped abruptly. Do not take with alcohol. Do not take in pregnancy. She will let us know if she becomes pregnant. The patient understands these risks.           Return in about 3 months (around 4/16/2023).       Diana Dorado D.O.  [facility] [place]

## 2023-01-17 ENCOUNTER — PRIOR AUTHORIZATION (OUTPATIENT)
Dept: FAMILY MEDICINE CLINIC | Facility: CLINIC | Age: 23
End: 2023-01-17
Payer: MEDICAID

## 2023-01-18 ENCOUNTER — PRIOR AUTHORIZATION (OUTPATIENT)
Dept: FAMILY MEDICINE CLINIC | Facility: CLINIC | Age: 23
End: 2023-01-18
Payer: MEDICAID

## 2023-03-03 ENCOUNTER — OFFICE VISIT (OUTPATIENT)
Dept: FAMILY MEDICINE CLINIC | Facility: CLINIC | Age: 23
End: 2023-03-03
Payer: MEDICAID

## 2023-03-03 ENCOUNTER — LAB (OUTPATIENT)
Dept: LAB | Facility: HOSPITAL | Age: 23
End: 2023-03-03
Payer: MEDICAID

## 2023-03-03 ENCOUNTER — TELEPHONE (OUTPATIENT)
Dept: FAMILY MEDICINE CLINIC | Facility: CLINIC | Age: 23
End: 2023-03-03

## 2023-03-03 VITALS
HEIGHT: 64 IN | TEMPERATURE: 98 F | RESPIRATION RATE: 21 BRPM | WEIGHT: 158.4 LBS | SYSTOLIC BLOOD PRESSURE: 122 MMHG | OXYGEN SATURATION: 98 % | DIASTOLIC BLOOD PRESSURE: 84 MMHG | BODY MASS INDEX: 27.04 KG/M2 | HEART RATE: 97 BPM

## 2023-03-03 DIAGNOSIS — J02.9 SORE THROAT: ICD-10-CM

## 2023-03-03 DIAGNOSIS — J02.9 SORE THROAT: Primary | ICD-10-CM

## 2023-03-03 DIAGNOSIS — R10.9 STOMACH PAIN: ICD-10-CM

## 2023-03-03 LAB
B-HCG UR QL: NEGATIVE
EXPIRATION DATE: NORMAL
EXPIRATION DATE: NORMAL
INTERNAL CONTROL: NORMAL
INTERNAL NEGATIVE CONTROL: NORMAL
INTERNAL POSITIVE CONTROL: NORMAL
Lab: NORMAL
Lab: NORMAL
S PYO AG THROAT QL: NEGATIVE
SARS-COV-2 RNA NOSE QL NAA+PROBE: NOT DETECTED

## 2023-03-03 PROCEDURE — 87880 STREP A ASSAY W/OPTIC: CPT | Performed by: STUDENT IN AN ORGANIZED HEALTH CARE EDUCATION/TRAINING PROGRAM

## 2023-03-03 PROCEDURE — 99214 OFFICE O/P EST MOD 30 MIN: CPT | Performed by: STUDENT IN AN ORGANIZED HEALTH CARE EDUCATION/TRAINING PROGRAM

## 2023-03-03 PROCEDURE — 81025 URINE PREGNANCY TEST: CPT | Performed by: STUDENT IN AN ORGANIZED HEALTH CARE EDUCATION/TRAINING PROGRAM

## 2023-03-03 PROCEDURE — U0004 COV-19 TEST NON-CDC HGH THRU: HCPCS

## 2023-03-03 RX ORDER — LANSOPRAZOLE 15 MG/1
15 CAPSULE, DELAYED RELEASE ORAL DAILY
Qty: 30 CAPSULE | Refills: 1 | Status: SHIPPED | OUTPATIENT
Start: 2023-03-03

## 2023-03-03 RX ORDER — ONDANSETRON 4 MG/1
4 TABLET, ORALLY DISINTEGRATING ORAL
COMMUNITY
Start: 2023-02-27 | End: 2023-03-03

## 2023-03-03 NOTE — TELEPHONE ENCOUNTER
Caller: Huyen Interiano    Relationship: Self    Best call back number:     What is the best time to reach you: ANYTIME    Who are you requesting to speak with (clinical staff, provider,  specific staff member): CLINICAL STAFF    Do you know the name of the person who called: HUYEN    What was the call regarding: PATIENT WAS ASKING IF HER STREP RESULTS WERE READY    Do you require a callback: YES

## 2023-03-03 NOTE — PROGRESS NOTES
"Chief Complaint  Abdominal Pain (ER 02/27/23 SJE /Omprezole didn't help ) and Sore Throat (Hurt to swallow for th past 2 days )    History of Present Illness       Belly pain  On the left upper and lower quadrant and across the center of her abdomen   She says her reflux has worsened and she has even noticed reflux through her nose  The pain has not worsened and she is stable  Placed on twice daily ppi at last visit  Symptoms not improved.   No black/bloody stool  No vomiting recently but she does have nausea  bms are normal. No diarrhea but she has been constipated recent.  Recommended senna as needed and docusate daily.   She has been tolerating food  Her ct scan was normal at the er and labs were reassuring.     Sore throat on the left side   Began: two days ago  Sick contacts: none   Fever:none   Cough: none   Aches: none      Depression  She has done well with increased dose of celexa and would like to continue.   No s/e    The following portions of the patient's history were reviewed and updated as appropriate: allergies, current medications, past family history, past medical history, past social history, past surgical history, and problem list.    OBJECTIVE:  /84   Pulse 97   Temp 98 °F (36.7 °C) (Infrared)   Resp 21   Ht 162.6 cm (64\")   Wt 71.8 kg (158 lb 6.4 oz)   SpO2 98%   BMI 27.19 kg/m²       Physical Exam  Constitutional:       General: She is not in acute distress.     Appearance: Normal appearance.   HENT:      Head: Normocephalic and atraumatic.      Mouth/Throat:      Mouth: Mucous membranes are moist.      Pharynx: No oropharyngeal exudate or posterior oropharyngeal erythema.   Eyes:      Extraocular Movements: Extraocular movements intact.   Cardiovascular:      Rate and Rhythm: Normal rate and regular rhythm.      Heart sounds: No murmur heard.  Pulmonary:      Effort: Pulmonary effort is normal. No respiratory distress.      Breath sounds: Normal breath sounds. No stridor. No " wheezing, rhonchi or rales.   Abdominal:      General: Bowel sounds are normal.      Palpations: Abdomen is soft.      Tenderness: There is abdominal tenderness (mildly tender left middle abdomen. murphys negative. ). There is no guarding or rebound.   Skin:     Findings: No rash.   Neurological:      General: No focal deficit present.      Mental Status: She is alert.   Psychiatric:         Mood and Affect: Mood normal.                    Assessment and Plan   Diagnoses and all orders for this visit:    1. Sore throat (Primary)  -     POCT rapid strep A  -     COVID-19 PCR, [facility], NP SWAB IN [facility] VIRAL TRANSPORT MEDIA/ORAL SWISH 24-30 HR TAT - Swab, Nasopharynx; Future    2. Stomach pain  -     Ambulatory Referral to Gastroenterology  -     Ambulatory referral for Screening EGD  -     POC Pregnancy, Urine  -     H. Pylori Breath Test - Breath, Lung    Other orders  -     lansoprazole (PREVACID) 15 MG capsule; Take 1 capsule by mouth Daily.  Dispense: 30 capsule; Refill: 1      Given uncontrolled reflux failing two different ppis and twice daily dosing will send for egd and refer to gastro. Ct scan looked okay as well as labs. Will refer to gastroenterology for follow up. Advised her to go to to er for any fever worsening pain or vomiting.   Start lansoprazole as dexilant was not covered by insurance. Check h pylori.       Depression  Continue ssri. stop if pregnancy occurs.     Return in about 2 months (around 5/3/2023).       Diana Dorado D.O.  [facility] [place]

## 2023-04-05 ENCOUNTER — LAB (OUTPATIENT)
Dept: LAB | Facility: HOSPITAL | Age: 23
End: 2023-04-05
Payer: MEDICAID

## 2023-04-05 ENCOUNTER — OFFICE VISIT (OUTPATIENT)
Dept: FAMILY MEDICINE CLINIC | Facility: CLINIC | Age: 23
End: 2023-04-05
Payer: MEDICAID

## 2023-04-05 VITALS
WEIGHT: 158 LBS | SYSTOLIC BLOOD PRESSURE: 110 MMHG | BODY MASS INDEX: 27.12 KG/M2 | TEMPERATURE: 98.4 F | OXYGEN SATURATION: 98 % | HEART RATE: 110 BPM | RESPIRATION RATE: 15 BRPM | DIASTOLIC BLOOD PRESSURE: 80 MMHG

## 2023-04-05 DIAGNOSIS — K21.9 GASTROESOPHAGEAL REFLUX DISEASE WITHOUT ESOPHAGITIS: Primary | ICD-10-CM

## 2023-04-05 PROCEDURE — 83013 H PYLORI (C-13) BREATH: CPT | Performed by: STUDENT IN AN ORGANIZED HEALTH CARE EDUCATION/TRAINING PROGRAM

## 2023-04-05 PROCEDURE — 99214 OFFICE O/P EST MOD 30 MIN: CPT | Performed by: STUDENT IN AN ORGANIZED HEALTH CARE EDUCATION/TRAINING PROGRAM

## 2023-04-05 PROCEDURE — 1159F MED LIST DOCD IN RCRD: CPT | Performed by: STUDENT IN AN ORGANIZED HEALTH CARE EDUCATION/TRAINING PROGRAM

## 2023-04-05 PROCEDURE — 1160F RVW MEDS BY RX/DR IN RCRD: CPT | Performed by: STUDENT IN AN ORGANIZED HEALTH CARE EDUCATION/TRAINING PROGRAM

## 2023-04-05 RX ORDER — CYCLOBENZAPRINE HCL 10 MG
10 TABLET ORAL NIGHTLY PRN
Qty: 30 TABLET | Refills: 0 | Status: SHIPPED | OUTPATIENT
Start: 2023-04-05

## 2023-04-05 NOTE — PROGRESS NOTES
Chief Complaint  Chest Pain and Arm Pain (Pt went to ER 2 days ago at [facility])    History of Present Illness     Chest pain  She was seen in the er for this.   Chest XRAY and ekg were negative for acute findings.   She says she feels fine today but it is pain when trying to sleep. The pain is between her shoulder and her chest on the left side. This has been going on for months. It is sore with palpation.   No Wheezing no sob.       gerd   She did not  the lansoprazole  She says she feels the food coming back up and this happens mostly when laying down at night.        The following portions of the patient's history were reviewed and updated as appropriate: allergies, current medications, past family history, past medical history, past social history, past surgical history, and problem list.    OBJECTIVE:  /80   Pulse 110   Temp 98.4 °F (36.9 °C)   Resp 15   Wt 71.7 kg (158 lb)   SpO2 98%   BMI 27.12 kg/m²       Physical Exam  Constitutional:       General: She is not in acute distress.     Appearance: Normal appearance.   HENT:      Head: Normocephalic and atraumatic.   Eyes:      Extraocular Movements: Extraocular movements intact.   Cardiovascular:      Rate and Rhythm: Normal rate and regular rhythm.      Heart sounds: No murmur heard.  Pulmonary:      Effort: Pulmonary effort is normal. No respiratory distress.      Breath sounds: Normal breath sounds. No stridor. No wheezing, rhonchi or rales.   Musculoskeletal:      Comments: Normal rom sensations  strength of left upper extremity.   The pain is present on palpation of anterior and posterior shoulder.   There is pain present with flexion at the shoulder. Lift off testing negative.    Skin:     Findings: No rash.   Neurological:      General: No focal deficit present.      Mental Status: She is alert.   Psychiatric:         Mood and Affect: Mood normal.                    Assessment and Plan   Diagnoses and all orders for this visit:    1.  Gastroesophageal reflux disease without esophagitis (Primary)    Other orders  -     cyclobenzaprine (FLEXERIL) 10 MG tablet; Take 1 tablet by mouth At Night As Needed for Muscle Spasms.  Dispense: 30 tablet; Refill: 0      Chest pain  EKG and chest xray have been reassuring. Echo was ordered.   Low suspicion for ACS.   The pain today sound musculoskeletal. The pain is anterior and posterior shoulder and present on palpation.   No injury. No overuse, but she feels this is associated with sleeping on the floor. She has been staying with her mom.   Will give muscle relaxant, advised to avoid lifting. Counseled on sedation with flexeril do not take and drive take only at night. Recommend PT. She has transport issues but agrees to do online pt at home.   Recommend for her to consider imaging in the future if pain persists in her shoulder. Tylenol for pain. Avoid nsaids given uncontrolled reflux.       She feels her anxiety is controlled.         gerd  Start lansoprazole.  She was referred to gastro for this but has been having transport issues.   Advised her to go to lab for h pylori testing.   We have discussed not eating within 3 hours of bed and avoidance of triggers such as spicy foods caffeine.    Return in about 1 month (around 5/5/2023).       Diana Dorado D.O.  [facility] [place]

## 2023-04-06 LAB — UREA BREATH TEST QL: NEGATIVE

## 2023-04-07 ENCOUNTER — TELEPHONE (OUTPATIENT)
Dept: FAMILY MEDICINE CLINIC | Facility: CLINIC | Age: 23
End: 2023-04-07
Payer: MEDICAID

## 2023-09-06 ENCOUNTER — TELEPHONE (OUTPATIENT)
Dept: FAMILY MEDICINE CLINIC | Facility: CLINIC | Age: 23
End: 2023-09-06

## 2023-09-06 NOTE — TELEPHONE ENCOUNTER
PATIENT HAS CALLED REQUESTING A CALL BACK WITH STATUS OF PREVIOUS REQUEST. PATIENT HAS CALLED WITH AN UPDATED NUMBER TO CALL HER BACK.   BEST CALL BACK NUMBER [phone]

## 2023-09-06 NOTE — TELEPHONE ENCOUNTER
Caller: Huyen Interiano    Relationship: Self    Best call back number: [phone]     What medication are you requesting: SOMETHING FOR GERD    What are your current symptoms: BURNING IN THROAT, FEELS LIKE THE PATIENT  IS SWALLOWING ACID.  TUMS IS NOT WORKING    How long have you been experiencing symptoms: YES, USUALLY TUMS HELP    Have you had these symptoms before:    [x] Yes  [] No    Have you been treated for these symptoms before:   [] Yes  [x] No    If a prescription is needed, what is your preferred pharmacy and phone number:    [facility] [email or web address]                ?    Additional notes:

## 2023-09-07 ENCOUNTER — TELEPHONE (OUTPATIENT)
Dept: FAMILY MEDICINE CLINIC | Facility: CLINIC | Age: 23
End: 2023-09-07
Payer: MEDICAID

## 2023-09-07 NOTE — TELEPHONE ENCOUNTER
HUB TO READ  LVM    Per Dr. Dorado     Please have her schedule an apt with us to take a look at her throat.

## 2023-09-14 ENCOUNTER — OFFICE VISIT (OUTPATIENT)
Dept: FAMILY MEDICINE CLINIC | Facility: CLINIC | Age: 23
End: 2023-09-14
Payer: MEDICAID

## 2023-09-14 VITALS
HEART RATE: 121 BPM | HEIGHT: 64 IN | TEMPERATURE: 98.5 F | WEIGHT: 162.4 LBS | DIASTOLIC BLOOD PRESSURE: 82 MMHG | BODY MASS INDEX: 27.72 KG/M2 | RESPIRATION RATE: 21 BRPM | SYSTOLIC BLOOD PRESSURE: 114 MMHG

## 2023-09-14 DIAGNOSIS — K21.9 GASTROESOPHAGEAL REFLUX DISEASE WITHOUT ESOPHAGITIS: Primary | ICD-10-CM

## 2023-09-14 PROCEDURE — 99214 OFFICE O/P EST MOD 30 MIN: CPT | Performed by: STUDENT IN AN ORGANIZED HEALTH CARE EDUCATION/TRAINING PROGRAM

## 2023-09-14 RX ORDER — PANTOPRAZOLE SODIUM 40 MG/1
40 TABLET, DELAYED RELEASE ORAL DAILY
Qty: 90 TABLET | Refills: 0 | Status: SHIPPED | OUTPATIENT
Start: 2023-09-14

## 2023-09-14 RX ORDER — CITALOPRAM HYDROBROMIDE 10 MG/1
TABLET ORAL
Qty: 60 TABLET | Refills: 0 | Status: SHIPPED | OUTPATIENT
Start: 2023-09-14

## 2023-09-14 NOTE — PROGRESS NOTES
"Chief Complaint  Heartburn (Acid reflux has increased in severity over the last month.)    Heartburn    Reflux  She has noticed reflux being worse lately. Tums used to help but no longer.   She did not try the lansoprazole.   No fever.   No unintentional weight loss, difficulty swallowing, ARNAUD, family history of upper gi cancer, new onset over 60 years old.       Anxiety  She stopped taking the celexa. She would like to be back on this as her anxiety is not controlled.   She denies any depression    The following portions of the patient's history were reviewed and updated as appropriate: allergies, current medications, past family history, past medical history, past social history, past surgical history, and problem list.    OBJECTIVE:  /82   Pulse (!) 121   Temp 98.5 °F (36.9 °C) (Infrared)   Resp 21   Ht 162.6 cm (64\")   Wt 73.7 kg (162 lb 6.4 oz)   BMI 27.88 kg/m²       Physical Exam  Constitutional:       General: She is not in acute distress.     Appearance: Normal appearance.   HENT:      Head: Normocephalic and atraumatic.   Eyes:      Extraocular Movements: Extraocular movements intact.   Cardiovascular:      Rate and Rhythm: Normal rate and regular rhythm.      Heart sounds: No murmur heard.  Pulmonary:      Effort: Pulmonary effort is normal. No respiratory distress.      Breath sounds: Normal breath sounds. No stridor. No wheezing, rhonchi or rales.   Abdominal:      General: Bowel sounds are normal.      Palpations: Abdomen is soft.      Tenderness: There is no abdominal tenderness. There is no guarding or rebound.   Skin:     Findings: No rash.   Neurological:      General: No focal deficit present.      Mental Status: She is alert.   Psychiatric:         Mood and Affect: Mood normal.                  Assessment and Plan   There are no diagnoses linked to this encounter.  Gerd  Start on protonix  We have discussed not eating within 3 hours of bed and avoidance of triggers such as spicy foods " caffeine.  No red flags      Anxiety  Start back on celexa  Counseled to not take if pregnant  She has tolerated this in the past.     Return in about 6 weeks (around 10/26/2023) for Annual.       Diana Dorado D.O.  [facility] [place]

## 2023-09-17 ENCOUNTER — DOCUMENTATION (OUTPATIENT)
Dept: FAMILY MEDICINE CLINIC | Facility: CLINIC | Age: 23
End: 2023-09-17
Payer: MEDICAID

## 2023-09-18 ENCOUNTER — TELEPHONE (OUTPATIENT)
Dept: FAMILY MEDICINE CLINIC | Facility: CLINIC | Age: 23
End: 2023-09-18
Payer: MEDICAID

## 2023-09-18 NOTE — PROGRESS NOTES
Pt contacted call services on 9/16/23 for concern of reaction to Celexa for which she started two days prior. She felt that she was having elevated blood pressure due to the medication with separate readings of 140s systolic and 150s systolic. I advised her that this is not a typical adverse reaction to Celexa but to stop the medication and monitor her blood pressure over the next day. Then to call our office on Monday so that we could review her blood pressure readings and discuss her medications. Instructions on when to report to the ER such as shortness of breath, very elevated blood pressures, chest pain etc were given. Valerie voiced understanding of these instructions and thanked me for returning her call.

## 2023-09-19 ENCOUNTER — OFFICE VISIT (OUTPATIENT)
Dept: FAMILY MEDICINE CLINIC | Facility: CLINIC | Age: 23
End: 2023-09-19
Payer: MEDICAID

## 2023-09-19 VITALS
WEIGHT: 161 LBS | HEART RATE: 103 BPM | BODY MASS INDEX: 26.82 KG/M2 | SYSTOLIC BLOOD PRESSURE: 132 MMHG | TEMPERATURE: 98.7 F | OXYGEN SATURATION: 99 % | DIASTOLIC BLOOD PRESSURE: 78 MMHG | HEIGHT: 65 IN

## 2023-09-19 DIAGNOSIS — R03.0 ELEVATED BLOOD PRESSURE READING: Primary | ICD-10-CM

## 2023-09-19 PROCEDURE — 1160F RVW MEDS BY RX/DR IN RCRD: CPT

## 2023-09-19 PROCEDURE — 99214 OFFICE O/P EST MOD 30 MIN: CPT

## 2023-09-19 PROCEDURE — 1159F MED LIST DOCD IN RCRD: CPT

## 2023-09-19 NOTE — PROGRESS NOTES
Office Note     Name: Huyen Interiano    : 2000     MRN: [id number]     Primary Concern  Hypertension and review medication  (Provider said stop taking citalopram (CeleXA))    Subjective     Subjective     History of Present Illness:  Huyen Interiano is a 23 y.o. female who presents today to [facility] for  the following .    HPI:   Elevated blood pressure: Onset 4 days ago.  Patient experienced symptoms in review of systems.  She has been taking her blood pressure at home 3 times a day, when she first gets up, mid day, and night time.  Home blood pressure readings are as follows: SBP ranges from 114-164 and DBP ranges from 80-100The only change in her routine is that she begin Celexa 1-2 days prior to elevated blood pressure onset.  Patient previously took this medication for several months, and tolerated it fine.  She was unsure if reinitiating the medication was the cause for her elevated blood pressures and decided to stop taking the medication after speaking to the on-call provider 3 days ago.  Drinks 1-2 sodas occasionally and Coffee 3 times monthly.     Review of Systems:   Review of Systems   Constitutional:  Positive for fatigue.   Respiratory:  Negative for shortness of breath.    Cardiovascular:  Negative for chest pain and palpitations.   Gastrointestinal:  Positive for abdominal pain (Related to menses) and nausea (Related to menses).   Musculoskeletal:  Negative for back pain, neck pain and neck stiffness.   Neurological:  Positive for dizziness, weakness, light-headedness and headache (baseline.). Negative for seizures, syncope and memory problem.   Psychiatric/Behavioral:  Positive for sleep disturbance (baseline.  Gets 6 hours of sleep a night.) and depressed mood. Negative for agitation and decreased concentration. The patient is nervous/anxious.      The following portions of the patient's history were reviewed and updated as appropriate: allergies,  current medications, past family history, past medical history, past social history, past surgical history and problem list.    Past Medical History:   Past Medical History:   Diagnosis Date    Blood disorder     Depression     Menstrual abnormality        Past Surgical History: History reviewed. No pertinent surgical history.    Immunizations:   Immunization History   Administered Date(s) Administered    COVID-19 (PFIZER) Purple Cap Monovalent 03/25/2021, 04/23/2021    COVID-19 (UNSPECIFIED) 03/01/2021, 04/01/2021    DTaP 5 2002, 2002, 01/08/2003, 01/07/2004, 08/07/2006    Flu Vaccine Quad PF >36MO 10/10/2018    Hep B, Adolescent or Pediatric 2002, 2002, 06/10/2003    Hib (PRP-OMP) 2002, 2002, 01/08/2003, 10/15/2004    IPV 2002, 2002, 06/10/2003, 08/07/2006    MMR 01/07/2004, 08/07/2006    PEDS-Pneumococcal Conjugate (PCV7) 2002, 2002, 01/08/2003, 01/07/2004, 10/15/2004    Varicella 06/10/2003        Current Medications:     Current Outpatient Medications:     citalopram (CeleXA) 10 MG tablet, Take one tab oral by mouth daily followed by two tabs oral daily thereafter., Disp: 60 tablet, Rfl: 0    pantoprazole (PROTONIX) 40 MG EC tablet, Take 1 tablet by mouth Daily., Disp: 90 tablet, Rfl: 0    Allergies:   Allergies   Allergen Reactions    Penicillins Hives       Family History:   Family History   Problem Relation Age of Onset    Cancer Mother     Diabetes Mother     Depression Mother     Seizures Mother     Thyroid disease Mother     Heart block Mother     Schizophrenia Father     Schizophrenia Brother        Social History:   Social History     Socioeconomic History    Marital status: Single   Tobacco Use    Smoking status: Never    Smokeless tobacco: Never   Vaping Use    Vaping Use: Never used   Substance and Sexual Activity    Alcohol use: Never    Drug use: Never    Sexual activity: Yes     Partners: Male     Birth control/protection: None        "    Objective     Objective     Vital Signs  /78 (BP Location: Left arm, Patient Position: Sitting, Cuff Size: Adult)   Pulse 103   Temp 98.7 °F (37.1 °C) (Infrared)   Ht 165.1 cm (65\")   Wt 73 kg (161 lb)   SpO2 99%   BMI 26.79 kg/m²   Estimated body mass index is 26.79 kg/m² as calculated from the following:    Height as of this encounter: 165.1 cm (65\").    Weight as of this encounter: 73 kg (161 lb).          Physical Exam:  Physical Exam  Constitutional:       General: She is not in acute distress.     Appearance: Normal appearance. She is not ill-appearing, toxic-appearing or diaphoretic.   HENT:      Head: Normocephalic and atraumatic.      Nose: Nose normal. No congestion or rhinorrhea.      Mouth/Throat:      Mouth: Mucous membranes are moist.      Pharynx: Oropharynx is clear. No oropharyngeal exudate or posterior oropharyngeal erythema.   Eyes:      Extraocular Movements: Extraocular movements intact.      Conjunctiva/sclera: Conjunctivae normal.      Pupils: Pupils are equal, round, and reactive to light.   Neck:      Vascular: No carotid bruit.   Cardiovascular:      Rate and Rhythm: Regular rhythm. Tachycardia present.      Pulses: Normal pulses.      Heart sounds: Normal heart sounds.   Pulmonary:      Effort: Pulmonary effort is normal. No respiratory distress.   Abdominal:      General: There is no distension.      Tenderness: There is no abdominal tenderness. There is no guarding or rebound.   Musculoskeletal:      Cervical back: Normal range of motion.   Lymphadenopathy:      Cervical: No cervical adenopathy.   Skin:     General: Skin is warm.      Capillary Refill: Capillary refill takes less than 2 seconds.      Coloration: Skin is not jaundiced or pale.      Findings: No erythema or rash.   Neurological:      Mental Status: She is alert and oriented to person, place, and time.      Coordination: Coordination normal.      Gait: Gait normal.      Deep Tendon Reflexes: Reflexes normal. "   Psychiatric:         Mood and Affect: Mood normal.         Behavior: Behavior normal.         Thought Content: Thought content normal.       Procedures     Results for orders placed or performed in visit on 03/03/23   COVID-19 PCR, [facility], NP SWAB IN [facility]AL TRANSPORT MEDIA/ORAL SWISH 24-30 HR TAT - Swab, Nasopharynx    Specimen: Nasopharynx; Swab   Result Value Ref Range    SARS-CoV-2 CATALINA Not Detected Not Detected           Assessment / Plan    Assessment and Plan   Diagnoses and all orders for this visit:    1. Elevated blood pressure reading (Primary)    -Return to clinic in 1 week for blood pressure check.  -Continue monitoring blood pressure at home.  -Consider resuming Celexa after follow-up evaluation  -Patient has active orders for 2D echo that was ordered in January 2023.  She would like to do scheduled due current symptoms and family history of cardiac conditions.  -I informed the patient of worrisome symptoms.  Patient acknowledged and agrees to seek emergency treatment if symptoms develop.  Patient has no other questions at this time and is in agreement plan of care.      Follow Up   Return in about 1 week (around 9/26/2023) for Recheck BP.    Discussed possible differential diagnoses, testing, treatment, recommended non-pharmacological interventions, risks, warning signs to monitor for that would indicate need for follow-up in clinic or ER. If no improvement with these regimens or you have new or worsening symptoms follow-up. Patient verbalizes understanding and agreement with plan of care. Denies further needs or concerns.     Patient was given instructions and counseling regarding her condition or for health maintenance advice. Please see specific information pulled into the AVS if appropriate.     Carmenza Peres  [facility] [place]

## 2023-09-26 ENCOUNTER — OFFICE VISIT (OUTPATIENT)
Dept: FAMILY MEDICINE CLINIC | Facility: CLINIC | Age: 23
End: 2023-09-26
Payer: MEDICAID

## 2023-09-26 VITALS
DIASTOLIC BLOOD PRESSURE: 92 MMHG | TEMPERATURE: 98.6 F | HEART RATE: 90 BPM | OXYGEN SATURATION: 99 % | RESPIRATION RATE: 19 BRPM | HEIGHT: 65 IN | BODY MASS INDEX: 27.09 KG/M2 | WEIGHT: 162.6 LBS | SYSTOLIC BLOOD PRESSURE: 122 MMHG

## 2023-09-26 DIAGNOSIS — Z00.00 ANNUAL PHYSICAL EXAM: Primary | ICD-10-CM

## 2023-09-26 RX ORDER — PROPRANOLOL HYDROCHLORIDE 20 MG/1
TABLET ORAL
Qty: 180 TABLET | Refills: 0 | Status: SHIPPED | OUTPATIENT
Start: 2023-09-26

## 2023-09-26 RX ORDER — PROPRANOLOL HYDROCHLORIDE 20 MG/1
20 TABLET ORAL 2 TIMES DAILY PRN
Qty: 60 TABLET | Refills: 0 | Status: SHIPPED | OUTPATIENT
Start: 2023-09-26 | End: 2023-09-26

## 2023-09-26 NOTE — PROGRESS NOTES
"Chief Complaint  Blood Pressure Check (One week follow up, bp check)    History of Present Illness      Elevated blood pressure readings  She has been checking her bp at home and her bp will have some spikes up to 160s or 170s. The last few days highest has been 150.         Anxiety  She stopped the ssri to see if this contributes to htn.       The following portions of the patient's history were reviewed and updated as appropriate: allergies, current medications, past family history, past medical history, past social history, past surgical history, and problem list.    OBJECTIVE:  /92 (BP Location: Right arm, Patient Position: Sitting, Cuff Size: Adult)   Pulse 90   Temp 98.6 °F (37 °C) (Temporal)   Resp 19   Ht 165.1 cm (65\")   Wt 73.8 kg (162 lb 9.6 oz)   SpO2 99%   BMI 27.06 kg/m²       Physical Exam  Constitutional:       General: She is not in acute distress.     Appearance: Normal appearance.   HENT:      Head: Normocephalic and atraumatic.   Eyes:      Extraocular Movements: Extraocular movements intact.   Cardiovascular:      Rate and Rhythm: Normal rate and regular rhythm.      Heart sounds: No murmur heard.  Pulmonary:      Effort: Pulmonary effort is normal. No respiratory distress.      Breath sounds: Normal breath sounds. No stridor. No wheezing, rhonchi or rales.   Skin:     Findings: No rash.   Neurological:      General: No focal deficit present.      Mental Status: She is alert.   Psychiatric:         Mood and Affect: Mood normal.                  Assessment and Plan   There are no diagnoses linked to this encounter.  Anxiety  Elevated bp readings       anxiety  She prefers to continue back on lexapro rather than starting a new med. She has been referred to behavioral health.        Likely stress/anxiety contributing to elevated bp.   Will give propranolol prn for bp above 160 systolic on arm cuff (has been using wrist cuff not as accurate).   Advised her to stop the meds if " pregnant.  Counseled to stop propranolol or any dizziness or systolic below 100. Counseled on bradycardia or dizziness.     Return in about 2 weeks (around 10/10/2023).       Diana Dorado D.O.  [facility] [place]

## 2023-10-11 RX ORDER — CITALOPRAM HYDROBROMIDE 10 MG/1
TABLET ORAL
Qty: 60 TABLET | Refills: 0 | Status: SHIPPED | OUTPATIENT
Start: 2023-10-11

## 2023-10-17 ENCOUNTER — OFFICE VISIT (OUTPATIENT)
Dept: FAMILY MEDICINE CLINIC | Facility: CLINIC | Age: 23
End: 2023-10-17
Payer: MEDICAID

## 2023-10-17 VITALS
DIASTOLIC BLOOD PRESSURE: 68 MMHG | HEIGHT: 65 IN | BODY MASS INDEX: 27.02 KG/M2 | OXYGEN SATURATION: 99 % | RESPIRATION RATE: 21 BRPM | WEIGHT: 162.2 LBS | TEMPERATURE: 98.6 F | SYSTOLIC BLOOD PRESSURE: 112 MMHG | HEART RATE: 115 BPM

## 2023-10-17 DIAGNOSIS — Z34.90 PREGNANCY, UNSPECIFIED GESTATIONAL AGE: Primary | ICD-10-CM

## 2023-10-17 LAB
B-HCG UR QL: POSITIVE
EXPIRATION DATE: ABNORMAL
INTERNAL NEGATIVE CONTROL: ABNORMAL
INTERNAL POSITIVE CONTROL: ABNORMAL
Lab: ABNORMAL

## 2023-10-17 NOTE — PROGRESS NOTES
"Chief Complaint  Possible Pregnancy (Pt had positive pregnancy test last Sunday. Pt needs referral for OBGYN)    History of Present Illness      Her period was supposed to come today. No bleeding. She had a positive pregnancy on Sunday. She has already stopped the celexa and the protonix and propranolol. She has anxiety without depression. She denies any suicidal thoughts. She prefers to be off harmful meds if possible.      The following portions of the patient's history were reviewed and updated as appropriate: allergies, current medications, past family history, past medical history, past social history, past surgical history, and problem list.    OBJECTIVE:  /68   Pulse 115   Temp 98.6 °F (37 °C) (Temporal)   Resp 21   Ht 165.1 cm (65\")   Wt 73.6 kg (162 lb 3.2 oz)   SpO2 99%   BMI 26.99 kg/m²       Physical Exam  Constitutional:       General: She is not in acute distress.     Appearance: Normal appearance.   HENT:      Head: Normocephalic and atraumatic.   Eyes:      Extraocular Movements: Extraocular movements intact.   Cardiovascular:      Rate and Rhythm: Normal rate and regular rhythm.      Heart sounds: No murmur heard.  Pulmonary:      Effort: Pulmonary effort is normal. No respiratory distress.      Breath sounds: Normal breath sounds. No stridor. No wheezing, rhonchi or rales.   Abdominal:      Tenderness: There is no abdominal tenderness. There is no guarding or rebound.   Skin:     Findings: No rash.   Neurological:      General: No focal deficit present.      Mental Status: She is alert.   Psychiatric:         Mood and Affect: Mood normal.                    Assessment and Plan   Diagnoses and all orders for this visit:    1. Pregnancy, unspecified gestational age (Primary)  -     POC Pregnancy, Urine  -     Ambulatory Referral to Obstetrics / Gynecology      Elevated blood pressure readings   Bp controlled off meds.   She is taking prenatal vitamin.   Will refer to obgyn. "       gerd  She feels gerd is okay at this time. We have discussed not eating within 3 hours of bed and avoidance of triggers such as spicy foods caffeine.          Anxiety  She will consider risk benefit of ssri in pregnancy and will let me know. Discussed they are category c and what this means.     Return in about 3 months (around 1/17/2024).       Diana Dorado D.O.  [facility] [place] [facility] [place]

## 2023-10-23 ENCOUNTER — OFFICE VISIT (OUTPATIENT)
Dept: FAMILY MEDICINE CLINIC | Facility: CLINIC | Age: 23
End: 2023-10-23
Payer: MEDICAID

## 2023-10-23 VITALS
RESPIRATION RATE: 23 BRPM | OXYGEN SATURATION: 98 % | DIASTOLIC BLOOD PRESSURE: 70 MMHG | SYSTOLIC BLOOD PRESSURE: 138 MMHG | HEIGHT: 65 IN | TEMPERATURE: 98.7 F | WEIGHT: 164.6 LBS | HEART RATE: 120 BPM | BODY MASS INDEX: 27.42 KG/M2

## 2023-10-23 DIAGNOSIS — R00.0 TACHYCARDIA: Primary | ICD-10-CM

## 2023-10-23 PROCEDURE — 93000 ELECTROCARDIOGRAM COMPLETE: CPT | Performed by: STUDENT IN AN ORGANIZED HEALTH CARE EDUCATION/TRAINING PROGRAM

## 2023-10-23 PROCEDURE — 99214 OFFICE O/P EST MOD 30 MIN: CPT | Performed by: STUDENT IN AN ORGANIZED HEALTH CARE EDUCATION/TRAINING PROGRAM

## 2023-10-23 NOTE — PROGRESS NOTES
"Chief Complaint  Rapid Heart Rate (Pt has been having rapid heart rate when moving around with some dizziness. )    History of Present Illness        While resting hr is 90. When she stands up her heart rate will be 130s just to go to the restroom.   No chest pain.   She notes some dizziness when she stands up too long.   She does feel anxious all the time. She wants to be back on anxiety medication, but also does not want to be given possible risks of ssri.       The following portions of the patient's history were reviewed and updated as appropriate: allergies, current medications, past family history, past medical history, past social history, past surgical history, and problem list.    OBJECTIVE:  /70   Pulse 120   Temp 98.7 °F (37.1 °C) (Temporal)   Resp 23   Ht 165.1 cm (65\")   Wt 74.7 kg (164 lb 9.6 oz)   SpO2 98%   BMI 27.39 kg/m²       Physical Exam  Constitutional:       General: She is not in acute distress.     Appearance: Normal appearance.   HENT:      Head: Normocephalic and atraumatic.   Eyes:      Extraocular Movements: Extraocular movements intact.   Cardiovascular:      Rate and Rhythm: Normal rate and regular rhythm.      Heart sounds: No murmur heard.  Pulmonary:      Effort: Pulmonary effort is normal. No respiratory distress.      Breath sounds: Normal breath sounds. No stridor. No wheezing, rhonchi or rales.   Skin:     Findings: No rash.   Neurological:      General: No focal deficit present.      Mental Status: She is alert.   Psychiatric:         Mood and Affect: Mood normal.            ECG 12 Lead    Date/Time: 10/23/2023 4:20 PM  Performed by: Diana Dorado DO    Authorized by: Diana Dorado DO  Comparison: compared with previous ECG   Similar to previous ECG  Rhythm: sinus rhythm  Rate: normal  Conduction: conduction normal  ST Segments: ST segments normal  T Waves: T waves normal  QRS axis: normal  Other: no other findings    Clinical impression: normal ECG     "       Assessment and Plan   Diagnoses and all orders for this visit:    1. Tachycardia (Primary)  -     ECG 12 Lead  -     Holter Monitor - 48 Hour; Future  -     Adult Transthoracic Echo Complete W/ Cont if Necessary Per Protocol; Future            Check holter and echo. EKG showing sinus rhythm without tachycardia.       anxiety  We discussed possible zoloft for anxiety . She will consider. She declines counseling. Recommend deep breathing techniques.     Advised her to go to er for any chest pain, hr above 150s or syncope.     No follow-ups on file.       Diana Dorado D.O.  [facility] [place]

## 2023-11-03 ENCOUNTER — HOSPITAL ENCOUNTER (OUTPATIENT)
Dept: CARDIOLOGY | Facility: HOSPITAL | Age: 23
Discharge: HOME | End: 2023-11-03
Payer: MEDICAID

## 2023-11-03 VITALS — WEIGHT: 164.68 LBS | BODY MASS INDEX: 27.44 KG/M2 | HEIGHT: 65 IN

## 2023-11-03 DIAGNOSIS — R00.0 TACHYCARDIA: ICD-10-CM

## 2023-11-03 LAB
BH CV ECHO MEAS - AO MAX PG: 5.8 MMHG
BH CV ECHO MEAS - AO MEAN PG: 3 MMHG
BH CV ECHO MEAS - AO ROOT DIAM: 2.9 CM
BH CV ECHO MEAS - AO V2 MAX: 120 CM/SEC
BH CV ECHO MEAS - AO V2 VTI: 21.2 CM
BH CV ECHO MEAS - AVA(I,D): 1.77 CM2
BH CV ECHO MEAS - EDV(CUBED): 72 ML
BH CV ECHO MEAS - EDV(MOD-SP2): 58 ML
BH CV ECHO MEAS - EDV(MOD-SP4): 58 ML
BH CV ECHO MEAS - EF(MOD-BP): 66 %
BH CV ECHO MEAS - EF(MOD-SP2): 65.5 %
BH CV ECHO MEAS - EF(MOD-SP4): 65.5 %
BH CV ECHO MEAS - ESV(CUBED): 18.6 ML
BH CV ECHO MEAS - ESV(MOD-SP2): 20 ML
BH CV ECHO MEAS - ESV(MOD-SP4): 20 ML
BH CV ECHO MEAS - FS: 36.3 %
BH CV ECHO MEAS - IVS/LVPW: 1.2 CM
BH CV ECHO MEAS - IVSD: 0.84 CM
BH CV ECHO MEAS - LA DIMENSION: 3.3 CM
BH CV ECHO MEAS - LAT PEAK E' VEL: 15.2 CM/SEC
BH CV ECHO MEAS - LV DIASTOLIC VOL/BSA (35-75): 31.9 CM2
BH CV ECHO MEAS - LV MASS(C)D: 95.3 GRAMS
BH CV ECHO MEAS - LV MAX PG: 3.2 MMHG
BH CV ECHO MEAS - LV MEAN PG: 2 MMHG
BH CV ECHO MEAS - LV SYSTOLIC VOL/BSA (12-30): 11 CM2
BH CV ECHO MEAS - LV V1 MAX: 88.8 CM/SEC
BH CV ECHO MEAS - LV V1 VTI: 13.2 CM
BH CV ECHO MEAS - LVIDD: 4.2 CM
BH CV ECHO MEAS - LVIDS: 2.7 CM
BH CV ECHO MEAS - LVOT AREA: 2.8 CM2
BH CV ECHO MEAS - LVOT DIAM: 1.9 CM
BH CV ECHO MEAS - LVPWD: 0.7 CM
BH CV ECHO MEAS - MED PEAK E' VEL: 14.3 CM/SEC
BH CV ECHO MEAS - MV A MAX VEL: 61 CM/SEC
BH CV ECHO MEAS - MV DEC SLOPE: 411 CM/SEC2
BH CV ECHO MEAS - MV DEC TIME: 0.15 SEC
BH CV ECHO MEAS - MV E MAX VEL: 109 CM/SEC
BH CV ECHO MEAS - MV E/A: 1.79
BH CV ECHO MEAS - MV P1/2T: 74.8 MSEC
BH CV ECHO MEAS - MVA(P1/2T): 2.9 CM2
BH CV ECHO MEAS - PA ACC SLOPE: 766 CM/SEC2
BH CV ECHO MEAS - PA ACC TIME: 0.1 SEC
BH CV ECHO MEAS - RAP SYSTOLE: 8 MMHG
BH CV ECHO MEAS - SI(MOD-SP2): 20.9 ML/M2
BH CV ECHO MEAS - SI(MOD-SP4): 20.9 ML/M2
BH CV ECHO MEAS - SV(LVOT): 37.4 ML
BH CV ECHO MEAS - SV(MOD-SP2): 38 ML
BH CV ECHO MEAS - SV(MOD-SP4): 38 ML
BH CV ECHO MEAS - TAPSE (>1.6): 1.8 CM
BH CV ECHO MEASUREMENTS AVERAGE E/E' RATIO: 7.39
BH CV VAS BP LEFT ARM: NORMAL MMHG
BH CV XLRA - RV BASE: 3.2 CM
BH CV XLRA - RV LENGTH: 7.4 CM
BH CV XLRA - RV MID: 2.7 CM
BH CV XLRA - TDI S': 12.8 CM/SEC
IVRT: 56 MS
LEFT ATRIUM VOLUME INDEX: 15.4 ML/M2
LEFT ATRIUM VOLUME: 28 ML

## 2023-11-03 PROCEDURE — 93306 TTE W/DOPPLER COMPLETE: CPT

## 2023-11-21 ENCOUNTER — INITIAL PRENATAL (OUTPATIENT)
Dept: OBSTETRICS AND GYNECOLOGY | Facility: CLINIC | Age: 23
End: 2023-11-21
Payer: MEDICAID

## 2023-11-21 VITALS — BODY MASS INDEX: 27.46 KG/M2 | WEIGHT: 165 LBS | DIASTOLIC BLOOD PRESSURE: 70 MMHG | SYSTOLIC BLOOD PRESSURE: 116 MMHG

## 2023-11-21 DIAGNOSIS — O99.341 ANXIETY IN PREGNANCY IN FIRST TRIMESTER, ANTEPARTUM: ICD-10-CM

## 2023-11-21 DIAGNOSIS — R51.9 PREGNANCY HEADACHE IN FIRST TRIMESTER: ICD-10-CM

## 2023-11-21 DIAGNOSIS — Z67.91 RH NEGATIVE STATUS DURING PREGNANCY IN FIRST TRIMESTER: ICD-10-CM

## 2023-11-21 DIAGNOSIS — F41.9 ANXIETY IN PREGNANCY IN FIRST TRIMESTER, ANTEPARTUM: ICD-10-CM

## 2023-11-21 DIAGNOSIS — D68.51 FACTOR V LEIDEN CARRIER: ICD-10-CM

## 2023-11-21 DIAGNOSIS — O26.891 RH NEGATIVE STATUS DURING PREGNANCY IN FIRST TRIMESTER: ICD-10-CM

## 2023-11-21 DIAGNOSIS — Z34.81 PRENATAL CARE, SUBSEQUENT PREGNANCY IN FIRST TRIMESTER: Primary | ICD-10-CM

## 2023-11-21 DIAGNOSIS — O26.891 PREGNANCY HEADACHE IN FIRST TRIMESTER: ICD-10-CM

## 2023-11-21 DIAGNOSIS — Z88.0 PENICILLIN ALLERGY: ICD-10-CM

## 2023-11-21 LAB
AMPHET+METHAMPHET UR QL: NEGATIVE
AMPHETAMINES UR QL: NEGATIVE
BARBITURATES UR QL SCN: NEGATIVE
BENZODIAZ UR QL SCN: NEGATIVE
BUPRENORPHINE SERPL-MCNC: NEGATIVE NG/ML
C TRACH RRNA SPEC DONR QL NAA+PROBE: NEGATIVE
CANNABINOIDS SERPL QL: NEGATIVE
COCAINE UR QL: NEGATIVE
METHADONE UR QL SCN: NEGATIVE
N GONORRHOEA DNA SPEC QL NAA+PROBE: NEGATIVE
OPIATES UR QL: NEGATIVE
OXYCODONE UR QL SCN: NEGATIVE
PCP UR QL SCN: NEGATIVE
TRICYCLICS UR QL SCN: NEGATIVE

## 2023-11-21 PROCEDURE — 87086 URINE CULTURE/COLONY COUNT: CPT | Performed by: OBSTETRICS & GYNECOLOGY

## 2023-11-21 PROCEDURE — 80306 DRUG TEST PRSMV INSTRMNT: CPT | Performed by: OBSTETRICS & GYNECOLOGY

## 2023-11-21 RX ORDER — PRENATAL VIT/IRON FUM/FOLIC AC 27MG-0.8MG
1 TABLET ORAL DAILY
COMMUNITY

## 2023-11-21 RX ORDER — MAGNESIUM OXIDE 400 MG/1
400 TABLET ORAL DAILY PRN
Qty: 30 TABLET | Refills: 1 | Status: SHIPPED | OUTPATIENT
Start: 2023-11-21

## 2023-11-21 RX ORDER — ASPIRIN 81 MG/1
81 TABLET ORAL DAILY
Qty: 30 TABLET | Refills: 11 | Status: SHIPPED | OUTPATIENT
Start: 2023-11-21

## 2023-11-21 NOTE — PROGRESS NOTES
Subjective   Chief Complaint   Patient presents with    Initial Prenatal Visit       Huyen Interiano is a 23 y.o. year old .  Patient's last menstrual period was 2023 (exact date).  She presents to be seen to initiate prenatal care. She reports a history of factor V Leiden (heterozygous) with no personal prior history of VTE. Her mother does have a history of VTE in the past. She reports some headaches.     Social History    Tobacco Use      Smoking status: Never      Smokeless tobacco: Never      The following portions of the patient's history were reviewed and updated as appropriate:vital signs, allergies, current medications, past family history, past medical history, past social history, past surgical history, and problem list.    Objective   /70   Wt 74.8 kg (165 lb)   LMP 2023 (Exact Date)   BMI 27.46 kg/m²     General: well developed; well nourished  no acute distress   Skin: No suspicious lesions seen   Thyroid: normal to inspection and palpation   Heart:  Not performed.   Lungs: breathing is unlabored   Breasts:  Examined in supine position  Symmetric without masses or skin dimpling  Nipples normal without inversion, lesions or discharge  There are no palpable axillary nodes   Abdomen: soft, non-tender; no masses  no umbilical or inguinal hernias are present  no hepato-splenomegaly   Pelvis: Clinical staff was present for exam  External genitalia:  normal appearance of the external genitalia including Bartholin's and [place]'s glands.  :  urethral meatus normal;  Vaginal:  normal pink mucosa without prolapse or lesions.  Cervix:  normal appearance.  Uterus:  normal size, shape and consistency.  Adnexa:  normal bimanual exam of the adnexa.  Rectal:  digital rectal exam not performed; anus visually normal appearing.       Lab Review   No data reviewed    Imaging   Pelvic ultrasound report    Assessment & Plan   ASSESSMENT  23 y.o. year old  at 9w1d confirmed by ultrasound  today   Supervision of high risk pregnancy  Factor V Leiden heterozygous   Headaches in pregnancy  Anxiety in pregnancy (previously on celexa)    PLAN  The problem list for pregnancy was initiated today  Tests ordered today:  Orders Placed This Encounter   Procedures    Chlamydia trachomatis, Neisseria gonorrhoeae, Trichomonas vaginalis, PCR - Swab, Cervix     Standing Status:   Future     Standing Expiration Date:   12/20/2023     Order Specific Question:   Release to patient     Answer:   Routine Release [[id number]]    Urine Culture - Urine, Urine, Clean Catch     Standing Status:   Future     Standing Expiration Date:   11/20/2024     Order Specific Question:   Release to patient     Answer:   Routine Release [[id number]]    OB Panel With HIV     Standing Status:   Future     Standing Expiration Date:   11/20/2024     Order Specific Question:   Release to patient     Answer:   Routine Release [[id number]]    Hepatitis C Antibody     Standing Status:   Future     Standing Expiration Date:   11/20/2024     Order Specific Question:   Release to patient     Answer:   Routine Release [[id number]]    Urine Drug Screen - Urine, Clean Catch     Please confirm all positive UDS     Standing Status:   Future     Standing Expiration Date:   11/20/2024     Order Specific Question:   Release to patient     Answer:   Routine Release [[id number]]     Testing for GC / Chlamydia / trichomonas was done today  Genetic testing reviewed: NIPT (Panorama), carrier screening (SMA and CF), and they will consider the information and make a decision at a later date.  Information reviewed: exercise in pregnancy, nutrition in pregnancy, weight gain in pregnancy, work and travel restrictions during pregnancy, list of OTC medications acceptable in pregnancy, call coverage groups, and vaccinations in pregnancy at this [place] (flu and covid-19 booster).  Pap was done today.  If she does not receive the results of the Pap within 2 weeks  time, she  was instructed to call to find out the results.  I explained to Huyen that the recommendations for Pap smear interval in a low risk patient's has lengthened to 3 years time.  I encouraged her to be seen yearly for a full physical exam including breast and pelvic exam even during the off years when PAP's will not be performed.    Total time spent today with Huyen  was 45-59 minutes (level 4).  Of this time, > 50% was spent face-to-face time coordinating care, answering her questions and counseling regarding pathophysiology of her presenting problem along with plans for any diagnositc work-up and treatment.    Follow up: 4 week(s)     New Medications Ordered This Visit   Medications    aspirin 81 MG EC tablet     Sig: Take 1 tablet by mouth Daily.     Dispense:  30 tablet     Refill:  11    magnesium oxide (MAG-OX) 400 MG tablet     Sig: Take 1 tablet by mouth Daily As Needed (headaches).     Dispense:  30 tablet     Refill:  1       This note was electronically signed.    Daria Thompson MD  November 21, 2023

## 2023-11-22 ENCOUNTER — PATIENT ROUNDING (BHMG ONLY) (OUTPATIENT)
Dept: OBSTETRICS AND GYNECOLOGY | Facility: CLINIC | Age: 23
End: 2023-11-22
Payer: MEDICAID

## 2023-11-22 LAB — BACTERIA SPEC AEROBE CULT: NO GROWTH

## 2023-11-27 PROBLEM — Z01.419 WELL WOMAN EXAM: Status: ACTIVE | Noted: 2023-11-27

## 2023-11-27 LAB — REF LAB TEST METHOD: NORMAL

## 2023-11-29 ENCOUNTER — DOCUMENTATION (OUTPATIENT)
Dept: OBSTETRICS AND GYNECOLOGY | Facility: CLINIC | Age: 23
End: 2023-11-29
Payer: MEDICAID

## 2023-12-01 ENCOUNTER — TELEPHONE (OUTPATIENT)
Dept: OBSTETRICS AND GYNECOLOGY | Facility: CLINIC | Age: 23
End: 2023-12-01
Payer: MEDICAID

## 2023-12-01 NOTE — TELEPHONE ENCOUNTER
Caller:     Relationship:     Best call back number: [phone]    What is the best time to reach you: ANYTIME    Who are you requesting to speak with (clinical staff, provider,  specific staff member): DR MONTESINOS            PATIENT CALLED INTO OFFICE STATING SHE IS HAVING SHARP PAIN ALONG HER ABDOMIN AS WELL AS IN HER BACK. PATIENT IS 10 WEEKS PREGNANT AND WAS WANTING A CALL BACK FROM DR MONTESINOS OR A NURSE ASAP.

## 2023-12-04 NOTE — TELEPHONE ENCOUNTER
Called and spoke with patient, informed her that Dr. Thompson was agreeable to previous phone call/discussion, we went over that advisement again, she verified understanding and had no further questions at this time.

## 2023-12-04 NOTE — TELEPHONE ENCOUNTER
Called and spoke with patient.  She states that the pain was located from mid/low abdominal area right below naval and then into pelvic area.  She states that she rested and it calmed down.  She states that she did not take any Tylenol for it.  No vaginal bleeding/spotting/leakage.  Patient states she is doing okay now, the area may be a little sore still but the pain seems to have mostly passed.  Patient was given general ED precautions and encouraged to keep well hydrated.  Routing to provider for additional advisement if applicable.

## 2023-12-05 ENCOUNTER — LAB (OUTPATIENT)
Dept: LAB | Facility: HOSPITAL | Age: 23
End: 2023-12-05
Payer: MEDICAID

## 2023-12-05 LAB
EXTERNAL ABO GROUPING: ABNORMAL
EXTERNAL ANTIBODY SCREEN: ABNORMAL
EXTERNAL HEMATOCRIT: 43 %
EXTERNAL HEMOGLOBIN: 14.5 G/DL
EXTERNAL HEPATITIS B SURFACE ANTIGEN: NEGATIVE
EXTERNAL PLATELET COUNT: 225 K/ΜL
EXTERNAL RH FACTOR: NEGATIVE
EXTERNAL SYPHILIS RPR SCREEN: ABNORMAL
HCV AB S/CO SERPL IA: NON REACTIVE
HIV 1+2 AB+HIV1 P24 AG SERPL QL IA: NON REACTIVE
RUBV IGG SERPL IA-ACNC: ABNORMAL

## 2023-12-07 ENCOUNTER — TELEPHONE (OUTPATIENT)
Dept: OBSTETRICS AND GYNECOLOGY | Facility: CLINIC | Age: 23
End: 2023-12-07

## 2023-12-07 ENCOUNTER — APPOINTMENT (OUTPATIENT)
Dept: CT IMAGING | Facility: HOSPITAL | Age: 23
End: 2023-12-07
Payer: MEDICAID

## 2023-12-07 ENCOUNTER — TELEPHONE (OUTPATIENT)
Dept: FAMILY MEDICINE CLINIC | Facility: CLINIC | Age: 23
End: 2023-12-07

## 2023-12-07 ENCOUNTER — HOSPITAL ENCOUNTER (EMERGENCY)
Facility: HOSPITAL | Age: 23
Discharge: HOME | End: 2023-12-07
Attending: EMERGENCY MEDICINE
Payer: MEDICAID

## 2023-12-07 VITALS
HEART RATE: 116 BPM | BODY MASS INDEX: 27.99 KG/M2 | WEIGHT: 168 LBS | HEIGHT: 65 IN | RESPIRATION RATE: 18 BRPM | SYSTOLIC BLOOD PRESSURE: 148 MMHG | OXYGEN SATURATION: 98 % | TEMPERATURE: 98.3 F | DIASTOLIC BLOOD PRESSURE: 98 MMHG

## 2023-12-07 DIAGNOSIS — H53.149 PHOTOPHOBIA: ICD-10-CM

## 2023-12-07 DIAGNOSIS — R51.9 GENERALIZED HEADACHE: Primary | ICD-10-CM

## 2023-12-07 DIAGNOSIS — F41.9 ANXIETY AND DEPRESSION: ICD-10-CM

## 2023-12-07 DIAGNOSIS — F32.A ANXIETY AND DEPRESSION: ICD-10-CM

## 2023-12-07 DIAGNOSIS — R11.0 NAUSEA: ICD-10-CM

## 2023-12-07 DIAGNOSIS — D68.51 FACTOR V LEIDEN MUTATION: ICD-10-CM

## 2023-12-07 DIAGNOSIS — Z3A.11 11 WEEKS GESTATION OF PREGNANCY: ICD-10-CM

## 2023-12-07 LAB
FLUAV RNA RESP QL NAA+PROBE: NOT DETECTED
FLUBV RNA RESP QL NAA+PROBE: NOT DETECTED
HOLD SPECIMEN: NORMAL
HOLD SPECIMEN: NORMAL
SARS-COV-2 RNA RESP QL NAA+PROBE: NOT DETECTED
WHOLE BLOOD HOLD COAG: NORMAL
WHOLE BLOOD HOLD SPECIMEN: NORMAL

## 2023-12-07 PROCEDURE — 96374 THER/PROPH/DIAG INJ IV PUSH: CPT

## 2023-12-07 PROCEDURE — 25810000003 SODIUM CHLORIDE 0.9 % SOLUTION: Performed by: PHYSICIAN ASSISTANT

## 2023-12-07 PROCEDURE — 99284 EMERGENCY DEPT VISIT MOD MDM: CPT

## 2023-12-07 PROCEDURE — 25010000002 DIPHENHYDRAMINE PER 50 MG: Performed by: PHYSICIAN ASSISTANT

## 2023-12-07 PROCEDURE — 25010000002 METOCLOPRAMIDE PER 10 MG: Performed by: PHYSICIAN ASSISTANT

## 2023-12-07 PROCEDURE — 70450 CT HEAD/BRAIN W/O DYE: CPT

## 2023-12-07 PROCEDURE — 87636 SARSCOV2 & INF A&B AMP PRB: CPT | Performed by: PHYSICIAN ASSISTANT

## 2023-12-07 PROCEDURE — 96375 TX/PRO/DX INJ NEW DRUG ADDON: CPT

## 2023-12-07 RX ORDER — ACETAMINOPHEN 500 MG
1000 TABLET ORAL ONCE
Status: COMPLETED | OUTPATIENT
Start: 2023-12-07 | End: 2023-12-07

## 2023-12-07 RX ORDER — DIPHENHYDRAMINE HYDROCHLORIDE 50 MG/ML
25 INJECTION INTRAMUSCULAR; INTRAVENOUS ONCE
Status: COMPLETED | OUTPATIENT
Start: 2023-12-07 | End: 2023-12-07

## 2023-12-07 RX ORDER — METOCLOPRAMIDE HYDROCHLORIDE 5 MG/ML
10 INJECTION INTRAMUSCULAR; INTRAVENOUS ONCE
Status: COMPLETED | OUTPATIENT
Start: 2023-12-07 | End: 2023-12-07

## 2023-12-07 RX ADMIN — ACETAMINOPHEN 1000 MG: 500 TABLET ORAL at 21:37

## 2023-12-07 RX ADMIN — SODIUM CHLORIDE 1000 ML: 9 INJECTION, SOLUTION INTRAVENOUS at 21:36

## 2023-12-07 RX ADMIN — DIPHENHYDRAMINE HYDROCHLORIDE 25 MG: 50 INJECTION INTRAMUSCULAR; INTRAVENOUS at 21:37

## 2023-12-07 RX ADMIN — METOCLOPRAMIDE 10 MG: 5 INJECTION, SOLUTION INTRAMUSCULAR; INTRAVENOUS at 21:36

## 2023-12-07 NOTE — TELEPHONE ENCOUNTER
Please have her try pyridoxine over the counter for nausea which may also help with headache. (Vitamin b6). Did they call her to get her in with gynecology/obstetrics?

## 2023-12-07 NOTE — TELEPHONE ENCOUNTER
Pt called us back. She has seen her OB doctor.      Call to give her advice on taking vitamin b6 and let her know if there is anything else she can do for nausea.       Call pt  [phone]

## 2023-12-07 NOTE — TELEPHONE ENCOUNTER
Caller: Huyen Interiano    Relationship: Self    Best call back number: [phone]     What was the call regarding: PATIENT STATES SHE HAS BEEN HAVING A HEADACHE AND SOME NAUSEA FOR 2 DAYS NOW AND SHE IS 11 WEEKS PREGNANT AND TYLENOL HAS NOT BEEN HELPING SO SHE WOULD LIKE TO KNOW IF THERE IS SOMETHING SHE CAN DO TO HELP WITH THIS.

## 2023-12-07 NOTE — TELEPHONE ENCOUNTER
Provider: DR BAYLON    Caller: CARLOS ANTHONY    Relationship to Patient: SELF    Phone Number: [phone]    Reason for Call: PATIENT CALLED AND STATED SHE HAS BEEN UNABLE TO GET RID OF A HEADACHE AND PCP RECOMMENDED TO CALL OBGYN    When was the patient last seen: 11/21/23    When did it start: ABOUT 2 DAYS AGO    Characteristics of symptom/severity: SEVERE HEADACHE THAT WON'T GO AWAY    Timing- Is it constant or intermittent: CONSTANT    What makes it worse: TRIED SLEEPING THROUGH IT, BUT CLOSING HER EYES MAKES HER NAUSEOUS AND DIZZY    What therapies/medications have you tried: TRIED TAKING TYLENOL, BUT IT ISN'T HELPING     PATIENT'S PCP RECOMMENDED VITAMIN B6 TO HELP WITH NAUSEA AND IT MIGHT HELP WITH HEADACHE, BUT RECOMMENDED TO CALL OBGYN FIRST

## 2023-12-07 NOTE — TELEPHONE ENCOUNTER
Dr. Thompson at obstetrics would know more about nausea of pregnancy that has not responded to B6.

## 2023-12-07 NOTE — TELEPHONE ENCOUNTER
Spoke with pt and advised her to go to the ER for evaluation.  Pt verbalized understanding and had no questions at this time.

## 2023-12-08 LAB — HOLD SPECIMEN: NORMAL

## 2023-12-08 NOTE — ED PROVIDER NOTES
Subjective   History of Present Illness  This is a 23-year-old female that presents the ER with headache x 2 days.  Patient is 11 weeks pregnant.  She is  2 para 0 miscarriage 1.  She follows with Dr. Thompson, OB/GYN.  She states that she slipped on the floor at her mom's house and woke up the next morning and had a generalized headache.  She describes it as pressure with aching.  She tried Tylenol without relief.  She reports nausea as well as fatigue/malaise, and sore throat.  She reports some muscular neck pain bilaterally but denies any stiffness.  She denies fever or chills or any other URI symptoms including rhinorrhea, nasal congestion, or cough.  She denies any abdominal pain or urinary or bowel changes.  She denies vaginal bleeding or discharge.  She said that her pregnancy has been going good.  She still reports some intermittent nausea but denies any vomiting.  Past medical history is significant for anxiety/depression, factor V leiden mutation.    History provided by:  Patient  Headache  Pain location:  Generalized  Quality: pressure.  Radiates to:  L neck and R neck (muscular neck pain.)  Severity currently:  7/10  Duration:  2 days  Timing:  Constant  Progression:  Unchanged  Chronicity:  New  Similar to prior headaches: no (Pt denies any history of frequent headaches or migraines.)    Context comment:  Pt is 11 weeks pregnant.  Generalized HA, sore throat, nausea. No h/o migraines. She said she slept on the floor 2 night ago at her Mom's. HA ever since.  Relieved by:  Nothing  Worsened by:  Nothing  Ineffective treatments:  Acetaminophen  Associated symptoms: fatigue, nausea, neck pain (muscular neck pain), photophobia and sore throat    Associated symptoms: no abdominal pain, no back pain, no blurred vision, no congestion, no cough, no diarrhea, no dizziness, no drainage, no ear pain, no eye pain, no facial pain, no fever, no focal weakness, no hearing loss, no loss of balance, no myalgias, no  near-syncope, no neck stiffness, no numbness, no paresthesias, no sinus pressure, no swollen glands, no visual change, no vomiting and no weakness    Risk factors comment:  11 weeks pregnant. Pt following with Dr. Thompson.  Miscarriage 1.      Review of Systems   Constitutional:  Positive for appetite change and fatigue. Negative for fever.   HENT:  Positive for sore throat. Negative for congestion, ear pain, hearing loss, postnasal drip and sinus pressure.    Eyes:  Positive for photophobia. Negative for blurred vision, pain and visual disturbance.   Respiratory: Negative.  Negative for cough.    Cardiovascular: Negative.  Negative for chest pain and near-syncope.   Gastrointestinal:  Positive for nausea. Negative for abdominal pain, diarrhea and vomiting.   Genitourinary: Negative.  Negative for dysuria, flank pain, frequency and vaginal bleeding.        11 weeks pregnant.   Miscarriage 1   Musculoskeletal:  Positive for neck pain (muscular neck pain). Negative for back pain, myalgias and neck stiffness.   Neurological:  Positive for headaches (generalized HA; pressure. Pt rates it a 7/10 on pain scale.). Negative for dizziness, focal weakness, weakness, numbness, paresthesias and loss of balance.   All other systems reviewed and are negative.      Past Medical History:   Diagnosis Date    Anxiety     She was previously on citalopram    Blood disorder     Depression     Factor V Leiden carrier     Menstrual abnormality        Allergies   Allergen Reactions    Penicillins Hives       Past Surgical History:   Procedure Laterality Date    WISDOM TOOTH EXTRACTION         Family History   Problem Relation Age of Onset    Cancer Mother     Diabetes Mother     Depression Mother     Seizures Mother     Thyroid disease Mother     Heart block Mother     Schizophrenia Father     Schizophrenia Brother        Social History     Socioeconomic History    Marital status: Single   Tobacco Use    Smoking status: Never     Smokeless tobacco: Never   Vaping Use    Vaping Use: Never used   Substance and Sexual Activity    Alcohol use: Never    Drug use: Never    Sexual activity: Yes     Partners: Male     Birth control/protection: None           Objective   Physical Exam  Vitals and nursing note reviewed.   Constitutional:       General: She is not in acute distress.     Appearance: Normal appearance. She is not ill-appearing, toxic-appearing or diaphoretic.      Comments: Patient resting comfortably.  No acute sign of pain or distress.  Nontoxic.   HENT:      Head: Normocephalic and atraumatic.      Right Ear: Tympanic membrane normal. Tympanic membrane is not erythematous, retracted or bulging.      Left Ear: Tympanic membrane normal. Tympanic membrane is not erythematous, retracted or bulging.      Ears:      Comments: Bilateral TMs are clear     Nose: Nose normal. No congestion or rhinorrhea.      Comments: No nasal congestion or rhinorrhea     Mouth/Throat:      Mouth: Mucous membranes are moist.      Pharynx: Oropharynx is clear. No pharyngeal swelling, oropharyngeal exudate or posterior oropharyngeal erythema.      Comments: Oral mucous membranes are moist.  Posterior pharynx is not erythematous  Eyes:      Extraocular Movements: Extraocular movements intact.      Right eye: Normal extraocular motion and no nystagmus.      Left eye: Normal extraocular motion and no nystagmus.      Conjunctiva/sclera: Conjunctivae normal.      Pupils: Pupils are equal, round, and reactive to light.      Comments: Positive photophobia to both eyes.  Pupils equal round reactive to light.  Extraocular movements intact.   Neck:      Meningeal: Brudzinski's sign and Kernig's sign absent.      Comments: Tenderness to bilateral cervical myofascia.  No C-spine tenderness.  Full range of motion.  No meningeal signs.  Cardiovascular:      Rate and Rhythm: Regular rhythm. Tachycardia present. No extrasystoles are present.     Pulses: Normal pulses.       Heart sounds: Normal heart sounds.      Comments: Mild tachycardia.  No ectopy  Pulmonary:      Effort: Pulmonary effort is normal.      Breath sounds: Normal breath sounds.      Comments: Lungs are clear to auscultation bilaterally  Abdominal:      General: Bowel sounds are normal. There is no distension.      Palpations: Abdomen is soft.      Tenderness: There is no abdominal tenderness. There is no right CVA tenderness, left CVA tenderness, guarding or rebound.      Comments: Abdomen soft and nontender.  No flank or CVA tenderness   Musculoskeletal:         General: Normal range of motion.      Cervical back: Normal range of motion and neck supple. Muscular tenderness present. No pain with movement or spinous process tenderness.      Right lower leg: No edema.      Left lower leg: No edema.   Lymphadenopathy:      Cervical: No cervical adenopathy.   Skin:     General: Skin is warm and dry.   Neurological:      General: No focal deficit present.      Mental Status: She is alert and oriented to person, place, and time.      Cranial Nerves: Cranial nerves 2-12 are intact.      Sensory: Sensation is intact.      Motor: Motor function is intact.      Coordination: Coordination is intact.      Comments: Alert oriented x 3.  Neuro intact and nonfocal.         Procedures           ED Course  ED Course as of 12/07/23 2256   u Dec 07, 2023   2253 ER assessment reveals stable vital signs and essentially normal physical exam.  Patient describes a mild sore throat but there is no redness, vesicles, or exudate.  Patient has no meningeal signs.  She is afebrile.  With history of factor V mutation and headache, which patient rates a 7 out of 10 without history of migraines or frequent headaches, we proceeded with CT of the head without contrast.  CT imaging revealed no acute intracranial abnormality.  Patient tested negative for COVID-19 and influenza.  We gave IV fluid bolus and migraine cocktail which is safe for pregnancy  and upon reassessment, headache has completely resolved.  Encourage fluids, rest, and Tylenol every 4-6 hours as needed for headache.  Recommend close follow-up with routine OB/GYN, Dr. Thompson.  Patient denies any abdominal pain or cramping or urinary changes or vaginal bleeding or discharge.  Return to the ER if worsening symptoms. [FC]      ED Course User Index  [FC] Tiesha Tellez, JEWELL                                   Recent Results (from the past 24 hour(s))   COVID-19 and FLU A/B PCR, 1 HR TAT - Swab, Nasopharynx    Collection Time: 12/07/23  8:46 PM    Specimen: Nasopharynx; Swab   Result Value Ref Range    COVID19 Not Detected Not Detected - Ref. Range    Influenza A PCR Not Detected Not Detected    Influenza B PCR Not Detected Not Detected   Green Top (Gel)    Collection Time: 12/07/23  9:33 PM   Result Value Ref Range    Extra Tube Hold for add-ons.    Lavender Top    Collection Time: 12/07/23  9:33 PM   Result Value Ref Range    Extra Tube hold for add-on    Gold Top - SST    Collection Time: 12/07/23  9:33 PM   Result Value Ref Range    Extra Tube Hold for add-ons.    Light Blue Top    Collection Time: 12/07/23  9:33 PM   Result Value Ref Range    Extra Tube Hold for add-ons.      Note: In addition to lab results from this visit, the labs listed above may include labs taken at another facility or during a different encounter within the last 24 hours. Please correlate lab times with ED admission and discharge times for further clarification of the services performed during this visit.    CT Head Without Contrast   Final Result   Impression:   No acute intracranial abnormality, specifically no intracranial hemorrhage.            Electronically Signed: Nicholas Donovan MD     12/7/2023 10:01 PM EST     Workstation ID: [id number]        Vitals:    12/07/23 1923   BP: 148/98   BP Location: Left arm   Patient Position: Sitting   Pulse: 116   Resp: 18   Temp: 98.3 °F (36.8 °C)   TempSrc: Oral   SpO2: 98%   Weight:  "76.2 kg (168 lb)   Height: 165.1 cm (65\")     Medications   sodium chloride 0.9 % bolus 1,000 mL (0 mL Intravenous Stopped 12/7/23 2255)   metoclopramide (REGLAN) injection 10 mg (10 mg Intravenous Given 12/7/23 2136)   diphenhydrAMINE (BENADRYL) injection 25 mg (25 mg Intravenous Given 12/7/23 2137)   acetaminophen (TYLENOL) tablet 1,000 mg (1,000 mg Oral Given 12/7/23 2137)     ECG/EMG Results (last 24 hours)       ** No results found for the last 24 hours. **          No orders to display                 Medical Decision Making  Amount and/or Complexity of Data Reviewed  Radiology: ordered.    Risk  OTC drugs.  Prescription drug management.        Final diagnoses:   Generalized headache   Nausea   Photophobia   11 weeks gestation of pregnancy   Factor V Leiden mutation   Anxiety and depression       ED Disposition  ED Disposition       ED Disposition   Discharge    Condition   Stable    Comment   --               Daria Thompson MD  [address] [address]  [phone]    Call in 1 day  Call tomorrow for first available recheck    [facility] EMERGENCY DEPARTMENT  [address] [id number] [phone]    If symptoms worsen         Medication List      No changes were made to your prescriptions during this visit.            Tiesha Tellez PA-C  12/07/23 2256    "

## 2023-12-08 NOTE — DISCHARGE INSTRUCTIONS
ER evaluation reveals stable vital signs and normal physical exam, including normal neurologic exam.  CT of the brain without contrast was within normal limits.  Patient tested negative for COVID-19 and influenza.  Headache improved with IV fluid bolus and migraine cocktail.  Patient needs to increase fluids and rest.  Tylenol every 4-6 hours as needed for headache.  Recommend for stable recheck with routine OB/GYN, Dr. Thompson.  Call in the morning for first available recheck.  Return to the ER if worsening symptoms.

## 2023-12-17 PROBLEM — Z34.82 PRENATAL CARE, SUBSEQUENT PREGNANCY IN SECOND TRIMESTER: Status: ACTIVE | Noted: 2023-11-21

## 2023-12-19 ENCOUNTER — ROUTINE PRENATAL (OUTPATIENT)
Dept: OBSTETRICS AND GYNECOLOGY | Facility: CLINIC | Age: 23
End: 2023-12-19
Payer: MEDICAID

## 2023-12-19 VITALS — BODY MASS INDEX: 27.29 KG/M2 | WEIGHT: 164 LBS

## 2023-12-19 DIAGNOSIS — Z34.82 PRENATAL CARE, SUBSEQUENT PREGNANCY IN SECOND TRIMESTER: Primary | ICD-10-CM

## 2023-12-19 DIAGNOSIS — D68.51 FACTOR V LEIDEN MUTATION: ICD-10-CM

## 2023-12-19 DIAGNOSIS — R00.2 PALPITATIONS: ICD-10-CM

## 2023-12-19 NOTE — PROGRESS NOTES
Chief Complaint   Patient presents with    Routine Prenatal Visit       HPI: Huyen is a  currently at 13w1d who today reports the following:  Nausea - No; Vaginal bleeding -  No; Heartburn - No.    She has noticed some changes in her heart rate to go up sometimes with eating certain foods like sugar.  She has been using a pulse oximeter of one of her family members.  She reports after cutting out some caffeine and sugar symptoms have improved.  She denies any chest pain or difficulty breathing    ROS:  GI: Constipation - No; Diarrhea - No    Neuro: Headache - No; Visual change - No      EXAM:  Vitals: See prenatal flowsheet   Abdomen: See prenatal flowsheet   Urine glucose/protein: See prenatal flowsheet   Pelvic: See prenatal flowsheet    CV - RRR  Resp - CTAB     Prenatal Labs  Lab Results   Component Value Date    HGB 14.3 2022    HEPBSAG Negative 2022    UFP4CXS3 Non-Reactive 2022    HEPCVIRUSABY Non-Reactive 2022    URINECX No growth 2023    CHLAMNAA negative 2023    NGONORRHON negative 2023       MDM:  Impression: Supervision of high risk pregnancy  Factor V Leiden heterozygous   Anxiety in pregnancy (previously on celexa, not currently on medications)   Tests done today: none   Topics discussed: Continue with PNV's  Prenatal labs reviewed  Reviewed cardiopulmonary precautions for call the office.  Recommend that she try to decrease caffeine and sugar and keep caffeine to less than 200 mg daily.  Encouraged increased hydration with water and small frequent snacks.  Continue ASA 81 mg  Request OB labs from [facility]   Tests scheduled today for her next visit:   none

## 2023-12-22 ENCOUNTER — TELEPHONE (OUTPATIENT)
Dept: OBSTETRICS AND GYNECOLOGY | Facility: CLINIC | Age: 23
End: 2023-12-22

## 2023-12-22 NOTE — TELEPHONE ENCOUNTER
Hub staff attempted to follow warm transfer process and was unsuccessful     Caller: Huyen Interiano    Relationship to patient: Self    Best call back number: [phone] / LVM    Patient is needing: PT IS 13W 4D OB PT AND IS HAVING RAPID HEARTBEAT CONSTANTLY EVEN WHEN LAYING DOWN - PT IS CONCERNED AND WANTS TO SPEAK WITH A NURSE    HUB UNABLE TO WT    THANK YOU!

## 2023-12-22 NOTE — TELEPHONE ENCOUNTER
Caller: Huyen Interiano    Relationship: Self    Best call back number: [phone] / LVM    Caller requesting test results: SELF    What test was performed: OB LABS    When was the test performed: 12/19/23    Where was the test performed: [facility]    Additional notes: PT IS WANTING SOMEONE TO CALL TO GO OVER LAB RESULTS      THANK YOU!

## 2024-01-02 ENCOUNTER — TELEPHONE (OUTPATIENT)
Dept: OBSTETRICS AND GYNECOLOGY | Facility: CLINIC | Age: 24
End: 2024-01-02
Payer: MEDICAID

## 2024-01-02 NOTE — TELEPHONE ENCOUNTER
Spoke with pt and informed her of her lab results.  Pt was also advised that she would need Rhogam at 28 weeks unless she has bleeding before then.

## 2024-01-02 NOTE — TELEPHONE ENCOUNTER
JAGUAR    Received a call from patient whom is asking to have someone give her a call, regarding test results (first trimester blood work). If someone could please follow up with the patient, it would be appreciated.     Thank you kindly.

## 2024-01-04 ENCOUNTER — APPOINTMENT (OUTPATIENT)
Dept: GENERAL RADIOLOGY | Facility: HOSPITAL | Age: 24
End: 2024-01-04
Payer: MEDICAID

## 2024-01-04 ENCOUNTER — TELEPHONE (OUTPATIENT)
Dept: OBSTETRICS AND GYNECOLOGY | Facility: CLINIC | Age: 24
End: 2024-01-04
Payer: MEDICAID

## 2024-01-04 ENCOUNTER — HOSPITAL ENCOUNTER (EMERGENCY)
Facility: HOSPITAL | Age: 24
Discharge: HOME | End: 2024-01-04
Attending: EMERGENCY MEDICINE
Payer: MEDICAID

## 2024-01-04 ENCOUNTER — APPOINTMENT (OUTPATIENT)
Dept: CT IMAGING | Facility: HOSPITAL | Age: 24
End: 2024-01-04
Payer: MEDICAID

## 2024-01-04 VITALS
WEIGHT: 160 LBS | RESPIRATION RATE: 16 BRPM | TEMPERATURE: 98.6 F | HEART RATE: 106 BPM | HEIGHT: 65 IN | SYSTOLIC BLOOD PRESSURE: 155 MMHG | OXYGEN SATURATION: 98 % | BODY MASS INDEX: 26.66 KG/M2 | DIASTOLIC BLOOD PRESSURE: 88 MMHG

## 2024-01-04 DIAGNOSIS — R06.02 SHORTNESS OF BREATH: Primary | ICD-10-CM

## 2024-01-04 DIAGNOSIS — Z34.92 PREGNANT AND NOT YET DELIVERED IN SECOND TRIMESTER: ICD-10-CM

## 2024-01-04 DIAGNOSIS — R00.0 SINUS TACHYCARDIA: ICD-10-CM

## 2024-01-04 LAB
ALBUMIN SERPL-MCNC: 4.2 G/DL (ref 3.5–5.2)
ALBUMIN/GLOB SERPL: 1.4 G/DL
ALP SERPL-CCNC: 57 U/L (ref 39–117)
ALT SERPL W P-5'-P-CCNC: 13 U/L (ref 1–33)
ANION GAP SERPL CALCULATED.3IONS-SCNC: 13 MMOL/L (ref 5–15)
AST SERPL-CCNC: 16 U/L (ref 1–32)
BASOPHILS # BLD AUTO: 0.03 10*3/MM3 (ref 0–0.2)
BASOPHILS NFR BLD AUTO: 0.3 % (ref 0–1.5)
BILIRUB SERPL-MCNC: 0.2 MG/DL (ref 0–1.2)
BUN SERPL-MCNC: 5 MG/DL (ref 6–20)
BUN/CREAT SERPL: 10.9 (ref 7–25)
CALCIUM SPEC-SCNC: 9.5 MG/DL (ref 8.6–10.5)
CHLORIDE SERPL-SCNC: 105 MMOL/L (ref 98–107)
CO2 SERPL-SCNC: 20 MMOL/L (ref 22–29)
CREAT SERPL-MCNC: 0.46 MG/DL (ref 0.57–1)
D DIMER PPP FEU-MCNC: 2.05 MCGFEU/ML (ref 0–0.5)
DEPRECATED RDW RBC AUTO: 41.1 FL (ref 37–54)
EGFRCR SERPLBLD CKD-EPI 2021: 139.8 ML/MIN/1.73
EOSINOPHIL # BLD AUTO: 0.03 10*3/MM3 (ref 0–0.4)
EOSINOPHIL NFR BLD AUTO: 0.3 % (ref 0.3–6.2)
ERYTHROCYTE [DISTWIDTH] IN BLOOD BY AUTOMATED COUNT: 12.7 % (ref 12.3–15.4)
GLOBULIN UR ELPH-MCNC: 2.9 GM/DL
GLUCOSE SERPL-MCNC: 79 MG/DL (ref 65–99)
HCT VFR BLD AUTO: 39.9 % (ref 34–46.6)
HGB BLD-MCNC: 13.9 G/DL (ref 12–15.9)
HOLD SPECIMEN: NORMAL
IMM GRANULOCYTES # BLD AUTO: 0.04 10*3/MM3 (ref 0–0.05)
IMM GRANULOCYTES NFR BLD AUTO: 0.3 % (ref 0–0.5)
LYMPHOCYTES # BLD AUTO: 2.01 10*3/MM3 (ref 0.7–3.1)
LYMPHOCYTES NFR BLD AUTO: 17.4 % (ref 19.6–45.3)
MAGNESIUM SERPL-MCNC: 1.9 MG/DL (ref 1.6–2.6)
MCH RBC QN AUTO: 30.7 PG (ref 26.6–33)
MCHC RBC AUTO-ENTMCNC: 34.8 G/DL (ref 31.5–35.7)
MCV RBC AUTO: 88.1 FL (ref 79–97)
MONOCYTES # BLD AUTO: 0.55 10*3/MM3 (ref 0.1–0.9)
MONOCYTES NFR BLD AUTO: 4.7 % (ref 5–12)
NEUTROPHILS NFR BLD AUTO: 77 % (ref 42.7–76)
NEUTROPHILS NFR BLD AUTO: 8.92 10*3/MM3 (ref 1.7–7)
NRBC BLD AUTO-RTO: 0 /100 WBC (ref 0–0.2)
NT-PROBNP SERPL-MCNC: <36 PG/ML (ref 0–450)
PLATELET # BLD AUTO: 202 10*3/MM3 (ref 140–450)
PMV BLD AUTO: 10.3 FL (ref 6–12)
POTASSIUM SERPL-SCNC: 3.9 MMOL/L (ref 3.5–5.2)
PROT SERPL-MCNC: 7.1 G/DL (ref 6–8.5)
QT INTERVAL: 314 MS
QTC INTERVAL: 453 MS
RBC # BLD AUTO: 4.53 10*6/MM3 (ref 3.77–5.28)
SODIUM SERPL-SCNC: 138 MMOL/L (ref 136–145)
T4 FREE SERPL-MCNC: 1.41 NG/DL (ref 0.93–1.7)
TROPONIN T SERPL HS-MCNC: <6 NG/L
TSH SERPL DL<=0.05 MIU/L-ACNC: 1.31 UIU/ML (ref 0.27–4.2)
WBC NRBC COR # BLD AUTO: 11.58 10*3/MM3 (ref 3.4–10.8)
WHOLE BLOOD HOLD COAG: NORMAL
WHOLE BLOOD HOLD SPECIMEN: NORMAL

## 2024-01-04 PROCEDURE — 71045 X-RAY EXAM CHEST 1 VIEW: CPT

## 2024-01-04 PROCEDURE — 99285 EMERGENCY DEPT VISIT HI MDM: CPT

## 2024-01-04 PROCEDURE — 80053 COMPREHEN METABOLIC PANEL: CPT | Performed by: EMERGENCY MEDICINE

## 2024-01-04 PROCEDURE — 93005 ELECTROCARDIOGRAM TRACING: CPT

## 2024-01-04 PROCEDURE — 84443 ASSAY THYROID STIM HORMONE: CPT | Performed by: EMERGENCY MEDICINE

## 2024-01-04 PROCEDURE — 71275 CT ANGIOGRAPHY CHEST: CPT

## 2024-01-04 PROCEDURE — 85379 FIBRIN DEGRADATION QUANT: CPT | Performed by: EMERGENCY MEDICINE

## 2024-01-04 PROCEDURE — 93005 ELECTROCARDIOGRAM TRACING: CPT | Performed by: EMERGENCY MEDICINE

## 2024-01-04 PROCEDURE — 85025 COMPLETE CBC W/AUTO DIFF WBC: CPT | Performed by: EMERGENCY MEDICINE

## 2024-01-04 PROCEDURE — 84439 ASSAY OF FREE THYROXINE: CPT | Performed by: EMERGENCY MEDICINE

## 2024-01-04 PROCEDURE — 83880 ASSAY OF NATRIURETIC PEPTIDE: CPT | Performed by: EMERGENCY MEDICINE

## 2024-01-04 PROCEDURE — 84484 ASSAY OF TROPONIN QUANT: CPT | Performed by: EMERGENCY MEDICINE

## 2024-01-04 PROCEDURE — 83735 ASSAY OF MAGNESIUM: CPT | Performed by: EMERGENCY MEDICINE

## 2024-01-04 PROCEDURE — 25810000003 SODIUM CHLORIDE 0.9 % SOLUTION: Performed by: EMERGENCY MEDICINE

## 2024-01-04 PROCEDURE — 25510000001 IOPAMIDOL PER 1 ML: Performed by: EMERGENCY MEDICINE

## 2024-01-04 RX ORDER — SODIUM CHLORIDE 0.9 % (FLUSH) 0.9 %
10 SYRINGE (ML) INJECTION AS NEEDED
Status: DISCONTINUED | OUTPATIENT
Start: 2024-01-04 | End: 2024-01-04 | Stop reason: HOSPADM

## 2024-01-04 RX ADMIN — IOPAMIDOL 51 ML: 755 INJECTION, SOLUTION INTRAVENOUS at 18:20

## 2024-01-04 RX ADMIN — SODIUM CHLORIDE 1000 ML: 9 INJECTION, SOLUTION INTRAVENOUS at 18:28

## 2024-01-04 NOTE — ED PROVIDER NOTES
Subjective   History of Present Illness    Pt presents with shortness of breath when supine for the last two nights.  It improves when she sits back up.  No dyspnea when upright or through the day.  Some dizziness and nausea during the day.  Denies chest pain or leg swelling.  No cough.      She reports 15w3d pregnant.  No other pregnancy related issues.    She has noted /77 last night but normal today.    She has left arm pain she reports is chronic and unchanged, says she has had workup in the past that was all negative.    Takes ASA daily.      History provided by:  Patient      Review of Systems   Constitutional:  Negative for fever.   Respiratory:  Positive for shortness of breath. Negative for cough.    Cardiovascular:  Negative for chest pain, palpitations and leg swelling.   Gastrointestinal:  Positive for nausea. Negative for abdominal pain and vomiting.   Neurological:  Positive for dizziness.   All other systems reviewed and are negative.      Past Medical History:   Diagnosis Date    Anxiety     She was previously on citalopram    Blood disorder     Depression     Factor V Leiden carrier     Menstrual abnormality        Allergies   Allergen Reactions    Penicillins Hives       Past Surgical History:   Procedure Laterality Date    WISDOM TOOTH EXTRACTION         Family History   Problem Relation Age of Onset    Schizophrenia Father     Cancer Mother 38        Cervical    Diabetes Mother     Depression Mother     Seizures Mother     Thyroid disease Mother     Heart block Mother     Thyroid cancer Mother         early 20s    Schizophrenia Brother        Social History     Socioeconomic History    Marital status: Single   Tobacco Use    Smoking status: Never    Smokeless tobacco: Never   Vaping Use    Vaping Use: Never used   Substance and Sexual Activity    Alcohol use: Never    Drug use: Never    Sexual activity: Yes     Partners: Male     Birth control/protection: None           Objective   Physical  Exam  Vitals and nursing note reviewed.   Constitutional:       General: She is not in acute distress.     Appearance: Normal appearance. She is not ill-appearing.   HENT:      Head: Normocephalic and atraumatic.      Mouth/Throat:      Mouth: Mucous membranes are moist.   Eyes:      General: No scleral icterus.        Right eye: No discharge.         Left eye: No discharge.      Conjunctiva/sclera: Conjunctivae normal.   Cardiovascular:      Rate and Rhythm: Regular rhythm. Tachycardia present.      Heart sounds: No murmur heard.  Pulmonary:      Effort: Pulmonary effort is normal. No respiratory distress.      Breath sounds: Normal breath sounds. No wheezing.   Abdominal:      General: Bowel sounds are normal. There is no distension.      Palpations: Abdomen is soft.      Tenderness: There is no abdominal tenderness. There is no guarding or rebound.   Musculoskeletal:         General: No swelling. Normal range of motion.      Cervical back: Normal range of motion and neck supple.   Skin:     General: Skin is warm and dry.      Findings: No rash.   Neurological:      General: No focal deficit present.      Mental Status: She is alert and oriented to person, place, and time. Mental status is at baseline.   Psychiatric:         Mood and Affect: Mood normal.         Behavior: Behavior normal.         Thought Content: Thought content normal.         Procedures           ED Course  ED Course as of 01/04/24 2239   Thu Jan 04, 2024   1815 D-dimer more than twice ULN, tachycardia and dyspnea with risk factor of pregnancy and questionable clotting disorder.  Discussed CTA risk/benefit in pregnancy with patient, low risk of radiation exposure to fetus, risk of no diagnosis if we don't scan.  Discussed her level of worry and clinical risk factors as stated.  She voiced understanding and consent for scan before we took her to CT. [BW]      ED Course User Index  [BW] Ron Sky MD    EKG ST.  CXR NAD. Labs benign  except D-dimer.  CTA discussed as above, is negative.    Patient stable on serial rechecks.  Discussed findings, concerns, plan of care, expected course, reasons to return and followup.  Provided the opportunity to ask questions.                                           Medical Decision Making  Problems Addressed:  Shortness of breath: complicated acute illness or injury  Sinus tachycardia: complicated acute illness or injury    Amount and/or Complexity of Data Reviewed  Labs: ordered. Decision-making details documented in ED Course.  Radiology: ordered and independent interpretation performed. Decision-making details documented in ED Course.     Details: CXR NAD, read by me  ECG/medicine tests: ordered and independent interpretation performed. Decision-making details documented in ED Course.     Details: EKG sinus tachycardia, read by me    Risk  Prescription drug management.        Final diagnoses:   Shortness of breath   Sinus tachycardia   Pregnant and not yet delivered in second trimester       ED Disposition  ED Disposition       ED Disposition   Discharge    Condition   Stable    Comment   --               Your OB    In 1 week           Medication List      No changes were made to your prescriptions during this visit.            Ron Sky MD  01/04/24 7154

## 2024-01-04 NOTE — TELEPHONE ENCOUNTER
----- Message from Huyen Interiano sent at 1/4/2024  1:15 PM EST -----  Regarding: Out of breathe   Contact: [phone]  Since yesterday I have been feeling like I can't breathe properly. I feel nauseous and dizzy. Especially laying down. I do have a bit of a stuffy nose but I feel like I'm not getting enough air and I didn't know if this is normal in pregnancy. My BP was a little High last night but this morning it was normal.

## 2024-01-04 NOTE — TELEPHONE ENCOUNTER
Spoke with pt and she stated that she has been very dizzy and having a lot of nausea.  Pt stated that she is also having shortness of breath and elevated blood pressure readings.  Pt has been advised to go to the ER for evaluation.

## 2024-01-16 ENCOUNTER — ROUTINE PRENATAL (OUTPATIENT)
Dept: OBSTETRICS AND GYNECOLOGY | Facility: CLINIC | Age: 24
End: 2024-01-16
Payer: MEDICAID

## 2024-01-16 VITALS — WEIGHT: 163.2 LBS | DIASTOLIC BLOOD PRESSURE: 80 MMHG | BODY MASS INDEX: 27.16 KG/M2 | SYSTOLIC BLOOD PRESSURE: 120 MMHG

## 2024-01-16 DIAGNOSIS — D68.51 FACTOR V LEIDEN MUTATION: ICD-10-CM

## 2024-01-16 DIAGNOSIS — Z34.82 PRENATAL CARE, SUBSEQUENT PREGNANCY IN SECOND TRIMESTER: Primary | ICD-10-CM

## 2024-01-16 NOTE — PROGRESS NOTES
"Chief Complaint   Patient presents with    Routine Prenatal Visit     Some occasional minor headaches, no other c/c       HPI: Huyen is a  currently at 17w1d who today reports the following:  Contractions - No; Leaking - No; Vaginal bleeding -  No; Heartburn - No.    She has questions about elevated blood pressures in the morning at home.  She is seeing her PCP tomorrow.    ROS:  GI: Nausea - No ; Constipation - No; Diarrhea - No    Neuro: Headache - No ; Visual change - No      EXAM:  Vitals: See prenatal flowsheet   Abdomen: See prenatal flowsheet   Urine glucose/protein: See prenatal flowsheet   Pelvic: See prenatal flowsheet     No results found for: \"ABORH\", \"ABO\", \"RH\", \"LABANTI\", \"ABSCRN\"    MDM:  Impression: Supervision of high risk pregnancy  Factor V Leiden heterozygous - on ASA 81 mg  Anxiety in pregnancy (previously on celexa, not currently on medications)   Tests done today: none   Topics discussed: Continue with PN's  Prenatal labs reviewed  Having staff check on why we do not have her labs from [facility] yet  Commend she take her blood pressure cuff to her PCPs office tomorrow and she can also bring it to our office to check against our readings.  Blood pressure today is normal.   Tests scheduled today for her next visit:   U/S for anatomic screening       "

## 2024-01-17 ENCOUNTER — REFERRAL TRIAGE (OUTPATIENT)
Dept: LABOR AND DELIVERY | Facility: HOSPITAL | Age: 24
End: 2024-01-17
Payer: MEDICAID

## 2024-01-17 ENCOUNTER — TELEPHONE (OUTPATIENT)
Dept: OBSTETRICS AND GYNECOLOGY | Facility: CLINIC | Age: 24
End: 2024-01-17
Payer: MEDICAID

## 2024-01-17 ENCOUNTER — OFFICE VISIT (OUTPATIENT)
Dept: FAMILY MEDICINE CLINIC | Facility: CLINIC | Age: 24
End: 2024-01-17
Payer: MEDICAID

## 2024-01-17 VITALS
WEIGHT: 165.2 LBS | HEIGHT: 65 IN | OXYGEN SATURATION: 98 % | SYSTOLIC BLOOD PRESSURE: 128 MMHG | HEART RATE: 108 BPM | RESPIRATION RATE: 21 BRPM | TEMPERATURE: 98.6 F | BODY MASS INDEX: 27.52 KG/M2 | DIASTOLIC BLOOD PRESSURE: 84 MMHG

## 2024-01-17 DIAGNOSIS — F41.9 ANXIETY: Primary | ICD-10-CM

## 2024-01-17 PROBLEM — O26.891 RH NEGATIVE STATUS DURING PREGNANCY IN FIRST TRIMESTER: Status: RESOLVED | Noted: 2023-11-21 | Resolved: 2024-01-17

## 2024-01-17 PROBLEM — O26.892 RH NEGATIVE STATUS DURING PREGNANCY IN SECOND TRIMESTER: Status: ACTIVE | Noted: 2024-01-17

## 2024-01-17 PROBLEM — Z67.91 RH NEGATIVE STATUS DURING PREGNANCY IN FIRST TRIMESTER: Status: RESOLVED | Noted: 2023-11-21 | Resolved: 2024-01-17

## 2024-01-17 PROBLEM — Z67.91 RH NEGATIVE STATUS DURING PREGNANCY IN SECOND TRIMESTER: Status: ACTIVE | Noted: 2024-01-17

## 2024-01-17 PROBLEM — O36.0120 ANTI-D ANTIBODIES PRESENT DURING PREGNANCY IN SECOND TRIMESTER: Status: ACTIVE | Noted: 2024-01-17

## 2024-01-17 PROCEDURE — 99213 OFFICE O/P EST LOW 20 MIN: CPT | Performed by: STUDENT IN AN ORGANIZED HEALTH CARE EDUCATION/TRAINING PROGRAM

## 2024-01-17 NOTE — TELEPHONE ENCOUNTER
Please inform patient we finally received her prenatal labs from [email or web address] and they are in her chart now.     They were overall normal except for a positive antibody screen. Can we ask her if she has received rhogam this pregnancy or in the past year?    Thanks, Daria Thompson MD

## 2024-01-17 NOTE — PROGRESS NOTES
"Chief Complaint  Hypertension    Hypertension        Elevated blood pressure readings  Bp is stable today.     She does continue to have anxiety about labor and worry about her baby. She denies any thoughts of self harm. She notes picking a lot and worry that happens constantly.     The following portions of the patient's history were reviewed and updated as appropriate: allergies, current medications, past family history, past medical history, past social history, past surgical history, and problem list.    OBJECTIVE:  /84   Pulse 108   Temp 98.6 °F (37 °C) (Temporal)   Resp 21   Ht 165.1 cm (65\")   Wt 74.9 kg (165 lb 3.2 oz)   SpO2 98%   BMI 27.49 kg/m²       Physical Exam  Constitutional:       General: She is not in acute distress.     Appearance: Normal appearance.   HENT:      Head: Normocephalic and atraumatic.   Eyes:      Extraocular Movements: Extraocular movements intact.   Cardiovascular:      Rate and Rhythm: Normal rate and regular rhythm.      Heart sounds: No murmur heard.  Pulmonary:      Effort: Pulmonary effort is normal. No respiratory distress.      Breath sounds: Normal breath sounds. No stridor. No wheezing, rhonchi or rales.   Skin:     Findings: No rash.   Neurological:      General: No focal deficit present.      Mental Status: She is alert.   Psychiatric:         Mood and Affect: Mood normal.                    Assessment and Plan   Diagnoses and all orders for this visit:    1. Anxiety (Primary)  -     Ambulatory Referral to Behavioral Health    We discussed meds vs therapy for anxiety.She is okay to go for therapy and if anxiety worsens she will consider medication.     Return in about 5 months (around 6/17/2024).       Diana Dorado D.O.  [facility] [place]   "

## 2024-01-18 NOTE — TELEPHONE ENCOUNTER
Spoke with pt and she stated that she had a Rhogam injection earlier in her pregnancy at one of her ER visits.

## 2024-01-29 ENCOUNTER — PATIENT OUTREACH (OUTPATIENT)
Dept: LABOR AND DELIVERY | Facility: HOSPITAL | Age: 24
End: 2024-01-29
Payer: MEDICAID
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
## 2024-01-31 ENCOUNTER — TELEPHONE (OUTPATIENT)
Dept: OBSTETRICS AND GYNECOLOGY | Facility: CLINIC | Age: 24
End: 2024-01-31
Payer: MEDICAID

## 2024-01-31 ENCOUNTER — HOSPITAL ENCOUNTER (OUTPATIENT)
Facility: HOSPITAL | Age: 24
Discharge: HOME | End: 2024-01-31
Attending: OBSTETRICS & GYNECOLOGY | Admitting: OBSTETRICS & GYNECOLOGY
Payer: MEDICAID

## 2024-01-31 VITALS
OXYGEN SATURATION: 100 % | SYSTOLIC BLOOD PRESSURE: 136 MMHG | BODY MASS INDEX: 25.71 KG/M2 | HEART RATE: 89 BPM | HEIGHT: 66 IN | WEIGHT: 160 LBS | TEMPERATURE: 99.2 F | RESPIRATION RATE: 16 BRPM | DIASTOLIC BLOOD PRESSURE: 81 MMHG
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
## 2024-01-31 PROCEDURE — G0463 HOSPITAL OUTPT CLINIC VISIT: HCPCS

## 2024-01-31 PROCEDURE — 99213 OFFICE O/P EST LOW 20 MIN: CPT | Performed by: OBSTETRICS & GYNECOLOGY

## 2024-01-31 NOTE — TELEPHONE ENCOUNTER
JAGUAR    Received call from patient stating she no longer feels fetal movement since her fall about a week ago. Spoke with providers medical assistant whom informed to send patient to [facility]. Per patient request, called [facility], spoke with Daria to whom I warm transferred call to. No follow up needed, this is of informative nature.
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
## 2024-02-09 ENCOUNTER — HOSPITAL ENCOUNTER (OUTPATIENT)
Facility: HOSPITAL | Age: 24
Setting detail: OBSERVATION
Discharge: HOME | End: 2024-02-09
Attending: OBSTETRICS & GYNECOLOGY | Admitting: OBSTETRICS & GYNECOLOGY
Payer: MEDICAID

## 2024-02-09 VITALS
HEIGHT: 66 IN | DIASTOLIC BLOOD PRESSURE: 83 MMHG | WEIGHT: 165 LBS | BODY MASS INDEX: 26.52 KG/M2 | HEART RATE: 119 BPM | SYSTOLIC BLOOD PRESSURE: 133 MMHG | TEMPERATURE: 98.3 F | OXYGEN SATURATION: 98 % | RESPIRATION RATE: 16 BRPM

## 2024-02-09 PROBLEM — Z34.92 SECOND TRIMESTER PREGNANCY: Status: ACTIVE | Noted: 2024-02-09

## 2024-02-09 LAB
BACTERIA UR QL AUTO: NORMAL /HPF
BILIRUB UR QL STRIP: NEGATIVE
CLARITY UR: ABNORMAL
COLOR UR: YELLOW
GLUCOSE UR STRIP-MCNC: NEGATIVE MG/DL
HGB UR QL STRIP.AUTO: NEGATIVE
HYALINE CASTS UR QL AUTO: NORMAL /LPF
KETONES UR QL STRIP: NEGATIVE
LEUKOCYTE ESTERASE UR QL STRIP.AUTO: NEGATIVE
NITRITE UR QL STRIP: NEGATIVE
PH UR STRIP.AUTO: 7 [PH] (ref 5–8)
PROT UR QL STRIP: NEGATIVE
RBC # UR STRIP: NORMAL /HPF
REF LAB TEST METHOD: NORMAL
SP GR UR STRIP: 1.02 (ref 1–1.03)
SQUAMOUS #/AREA URNS HPF: NORMAL /HPF
UROBILINOGEN UR QL STRIP: ABNORMAL
WBC # UR STRIP: NORMAL /HPF

## 2024-02-09 PROCEDURE — 99213 OFFICE O/P EST LOW 20 MIN: CPT | Performed by: OBSTETRICS & GYNECOLOGY

## 2024-02-09 PROCEDURE — 81001 URINALYSIS AUTO W/SCOPE: CPT | Performed by: OBSTETRICS & GYNECOLOGY

## 2024-02-09 PROCEDURE — G0463 HOSPITAL OUTPT CLINIC VISIT: HCPCS

## 2024-02-09 PROCEDURE — G0378 HOSPITAL OBSERVATION PER HR: HCPCS

## 2024-02-10 NOTE — NURSING NOTE
Patient given discharge instructions. No question or concerns at this time. Patient ambulated off unit in stable condition
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
## 2024-02-13 ENCOUNTER — HOSPITAL ENCOUNTER (OUTPATIENT)
Dept: WOMENS IMAGING | Facility: HOSPITAL | Age: 24
Discharge: HOME | End: 2024-02-13
Admitting: OBSTETRICS & GYNECOLOGY
Payer: MEDICAID

## 2024-02-13 ENCOUNTER — ROUTINE PRENATAL (OUTPATIENT)
Dept: OBSTETRICS AND GYNECOLOGY | Facility: CLINIC | Age: 24
End: 2024-02-13
Payer: MEDICAID

## 2024-02-13 ENCOUNTER — OFFICE VISIT (OUTPATIENT)
Dept: OBSTETRICS AND GYNECOLOGY | Facility: HOSPITAL | Age: 24
End: 2024-02-13
Payer: MEDICAID

## 2024-02-13 VITALS — WEIGHT: 165.4 LBS | BODY MASS INDEX: 26.7 KG/M2 | SYSTOLIC BLOOD PRESSURE: 130 MMHG | DIASTOLIC BLOOD PRESSURE: 70 MMHG

## 2024-02-13 VITALS — WEIGHT: 165 LBS | SYSTOLIC BLOOD PRESSURE: 154 MMHG | DIASTOLIC BLOOD PRESSURE: 83 MMHG | BODY MASS INDEX: 26.63 KG/M2

## 2024-02-13 DIAGNOSIS — Z3A.21 21 WEEKS GESTATION OF PREGNANCY: Primary | ICD-10-CM

## 2024-02-13 DIAGNOSIS — Z34.82 PRENATAL CARE, SUBSEQUENT PREGNANCY IN SECOND TRIMESTER: ICD-10-CM

## 2024-02-13 DIAGNOSIS — D68.51 FACTOR V LEIDEN MUTATION: ICD-10-CM

## 2024-02-13 DIAGNOSIS — O36.0120 ANTI-D ANTIBODIES PRESENT DURING PREGNANCY IN SECOND TRIMESTER, SINGLE OR UNSPECIFIED FETUS: ICD-10-CM

## 2024-02-13 DIAGNOSIS — D68.51 FACTOR V LEIDEN MUTATION: Primary | ICD-10-CM

## 2024-02-13 DIAGNOSIS — Z34.90 PREGNANCY, UNSPECIFIED GESTATIONAL AGE: ICD-10-CM

## 2024-02-13 PROCEDURE — 76811 OB US DETAILED SNGL FETUS: CPT

## 2024-02-19 ENCOUNTER — OFFICE VISIT (OUTPATIENT)
Dept: FAMILY MEDICINE CLINIC | Facility: CLINIC | Age: 24
End: 2024-02-19
Payer: MEDICAID

## 2024-02-19 VITALS
HEART RATE: 122 BPM | HEIGHT: 64 IN | BODY MASS INDEX: 28.13 KG/M2 | TEMPERATURE: 97.8 F | DIASTOLIC BLOOD PRESSURE: 80 MMHG | OXYGEN SATURATION: 98 % | WEIGHT: 164.8 LBS | SYSTOLIC BLOOD PRESSURE: 118 MMHG

## 2024-02-19 DIAGNOSIS — M79.89 SWELLING OF RIGHT HAND: Primary | ICD-10-CM

## 2024-02-19 DIAGNOSIS — Z34.90 PREGNANCY, UNSPECIFIED GESTATIONAL AGE: ICD-10-CM

## 2024-02-19 NOTE — PROGRESS NOTES
"    Follow Up Office Visit      Date: 2024   Patient Name: Huyen Interiano  : 2000   MRN: [id number]     Chief Complaint:    Chief Complaint   Patient presents with    hand swelling     Right x1 day       History of Present Illness: Huyen Interiano is a 23 y.o. female who presents today to discuss swollen hand.    Woke up at 6 am and noted her right hand was swollen.  Reports she sleeps on the other side.  Reports stayed swollen for about an hour then started going down.  No trauma that she knows of.  No break in the skin that she knows of.  No discoloration noted.    Sees Dr. Dorado for pcp.    Taking ASA and prenatal vitamin.    22 weeks gestation today.  Sees Heather Lara for ob/gyn.          Subjective      Review of Systems:   Review of Systems   Constitutional:  Negative for chills and fever.   Cardiovascular:  Negative for leg swelling.   Gastrointestinal:  Negative for nausea and vomiting.       I have reviewed the patients family history, social history, past medical history, past surgical history and have updated it as appropriate.     Medications:     Current Outpatient Medications:     aspirin 81 MG EC tablet, Take 1 tablet by mouth Daily., Disp: 30 tablet, Rfl: 11    Prenatal Vit-Fe Fumarate-FA (prenatal vitamin 27-0.8) 27-0.8 MG tablet tablet, Take 1 tablet by mouth Daily., Disp: , Rfl:     Allergies:   Allergies   Allergen Reactions    Penicillins Hives       Objective     Physical Exam: Please see above  Vital Signs:   Vitals:    24 1540   BP: 118/80   Pulse: (!) 122   Temp: 97.8 °F (36.6 °C)   TempSrc: Infrared   SpO2: 98%   Weight: 74.8 kg (164 lb 12.8 oz)   Height: 162.6 cm (64\")   PainSc: 0-No pain     Body mass index is 28.29 kg/m².    Physical Exam  Vitals and nursing note reviewed.   Constitutional:       Appearance: Normal appearance.   HENT:      Head: Normocephalic and atraumatic.   Neck:      Vascular: No carotid bruit.   Cardiovascular:      Rate and Rhythm: Normal " rate and regular rhythm.      Pulses:           Radial pulses are 2+ on the right side and 2+ on the left side.      Heart sounds: Normal heart sounds. No murmur heard.  Pulmonary:      Effort: Pulmonary effort is normal.      Breath sounds: Normal breath sounds.   Abdominal:      General: Bowel sounds are normal.      Palpations: Abdomen is soft. There is no mass.      Tenderness: There is no abdominal tenderness.   Musculoskeletal:      Right lower leg: No edema.      Left lower leg: No edema.   Skin:     Capillary Refill: Capillary refill takes less than 2 seconds.      Coloration: Skin is not jaundiced or pale.      Findings: No erythema.   Neurological:      Mental Status: She is alert. Mental status is at baseline.   Psychiatric:         Mood and Affect: Mood normal.         Behavior: Behavior normal.         Procedures    Results:   Labs:   Hemoglobin A1C   Date Value Ref Range Status   04/18/2022 5.00 4.80 - 5.60 % Final     TSH   Date Value Ref Range Status   01/04/2024 1.310 0.270 - 4.200 uIU/mL Final        POCT Results (if applicable):   Results for orders placed or performed during the hospital encounter of 02/09/24   Urinalysis With Microscopic If Indicated (No Culture) - Urine, Clean Catch    Specimen: Urine, Clean Catch   Result Value Ref Range    Color, UA Yellow Yellow, Straw    Appearance, UA Cloudy (A) Clear    pH, UA 7.0 5.0 - 8.0    Specific Gravity, UA 1.020 1.001 - 1.030    Glucose, UA Negative Negative    Ketones, UA Negative Negative    Bilirubin, UA Negative Negative    Blood, UA Negative Negative    Protein, UA Negative Negative    Leuk Esterase, UA Negative Negative    Nitrite, UA Negative Negative    Urobilinogen, UA 0.2 E.U./dL 0.2 - 1.0 E.U./dL   Urinalysis, Microscopic Only - Urine, Clean Catch    Specimen: Urine, Clean Catch   Result Value Ref Range    RBC, UA 0-2 None Seen, 0-2 /HPF    WBC, UA 0-2 None Seen, 0-2 /HPF    Bacteria, UA None Seen None Seen, Trace /HPF    Squamous  Epithelial Cells, UA 0-2 None Seen, 0-2 /HPF    Hyaline Casts, UA 0-6 0 - 6 /LPF    Methodology Automated Microscopy        Imaging:   No valid procedures specified.     Measures:     Assessment / Plan      Assessment/Plan:     1. Swelling of right hand  Reported.  Swelling has gone down now.  Patient with good radial and ulnar pulses bilateral upper extremities.  Encourage patient elevate hand when possible.  Can take Tylenol if needed if she has pain.  Also encourage patient to rest right hand when possible.      2. Pregnancy, unspecified gestational age  To continue to follow with OB/GYN.        Part of this note may be an electronic transcription/translation of spoken language to printed text using the Dragon Dictation System.        Vaccine Counseling:      Follow Up:   Return in about 2 weeks (around 3/4/2024), or with pcp if no improvement..      DO BRUNA Rivera

## 2024-02-19 NOTE — PATIENT INSTRUCTIONS
Elevate right hand when possible.  Can take tylenol  if needed for pain.  Rest right hand when possible.

## 2024-03-12 ENCOUNTER — ROUTINE PRENATAL (OUTPATIENT)
Dept: OBSTETRICS AND GYNECOLOGY | Facility: CLINIC | Age: 24
End: 2024-03-12
Payer: MEDICAID

## 2024-03-12 VITALS — SYSTOLIC BLOOD PRESSURE: 120 MMHG | BODY MASS INDEX: 28.84 KG/M2 | DIASTOLIC BLOOD PRESSURE: 80 MMHG | WEIGHT: 168 LBS

## 2024-03-12 DIAGNOSIS — O36.0120 ANTI-D ANTIBODIES PRESENT DURING PREGNANCY IN SECOND TRIMESTER, SINGLE OR UNSPECIFIED FETUS: ICD-10-CM

## 2024-03-12 DIAGNOSIS — D68.51 FACTOR V LEIDEN MUTATION: ICD-10-CM

## 2024-03-12 DIAGNOSIS — R12 HEARTBURN: ICD-10-CM

## 2024-03-12 DIAGNOSIS — Z3A.25 25 WEEKS GESTATION OF PREGNANCY: Primary | ICD-10-CM

## 2024-03-12 DIAGNOSIS — Z34.82 PRENATAL CARE, SUBSEQUENT PREGNANCY IN SECOND TRIMESTER: ICD-10-CM

## 2024-03-12 LAB
BILIRUB UR QL STRIP: NEGATIVE
CLARITY UR: CLEAR
COLOR UR: YELLOW
GLUCOSE UR STRIP-MCNC: NEGATIVE MG/DL
HGB UR QL STRIP.AUTO: NEGATIVE
KETONES UR QL STRIP: NEGATIVE
LEUKOCYTE ESTERASE UR QL STRIP.AUTO: NEGATIVE
NITRITE UR QL STRIP: NEGATIVE
PH UR STRIP.AUTO: 6 [PH] (ref 5–8)
PROT UR QL STRIP: NEGATIVE
SP GR UR STRIP: 1.02 (ref 1–1.03)
UROBILINOGEN UR QL STRIP: NORMAL

## 2024-03-12 PROCEDURE — 81003 URINALYSIS AUTO W/O SCOPE: CPT | Performed by: NURSE PRACTITIONER

## 2024-03-12 RX ORDER — FAMOTIDINE 20 MG/1
20 TABLET, FILM COATED ORAL 2 TIMES DAILY
Qty: 60 TABLET | Refills: 3 | Status: SHIPPED | OUTPATIENT
Start: 2024-03-12 | End: 2025-03-12

## 2024-03-12 NOTE — PROGRESS NOTES
"Chief Complaint   Patient presents with    Routine Prenatal Visit     No c/c       HPI: Huyen is a  currently at 25w1d who today reports the following:  She reports good fetal movement. She feels at times she has difficulty fully emptying her bladder, denies dysuira. She is having some tightness and \"grinding\" in her left hip.  Contractions - No; Leaking - No; Vaginal bleeding -  No; Heartburn - YES.     ROS:  GI: Nausea - No ; Constipation - No; Diarrhea - No    Neuro: Headache - No ; Visual change - No      EXAM:  Vitals: See prenatal flowsheet   Abdomen: See prenatal flowsheet   Urine glucose/protein: See prenatal flowsheet   Pelvic: See prenatal flowsheet     Lab Results   Component Value Date    ABO O 2023    RH Negative 2023    ABSCRN Abnormal (A) 2023       MDM:  Impression: Supervision of high risk pregnancy  Factor V Leiden heterozygous - on ASA 81 mg  Anxiety in pregnancy (previously on celexa, not currently on medications)   Tests done today: ua   Topics discussed: Continue with PNV's  Prenatal labs reviewed   labor signs and symptoms  Symptoms of preeclampsia  TDAP vaccination recommended between 27-36 weeks gestation OR after birth  Kick counts reviewed  Discussed comfort measures and stretches for hip pain in pregnancy.    Tests scheduled today for her next visit:   Gct, hgb, rpr       "

## 2024-03-13 LAB — HOLD SPECIMEN: NORMAL

## 2024-03-20 ENCOUNTER — TRANSCRIBE ORDERS (OUTPATIENT)
Dept: ADMINISTRATIVE | Facility: HOSPITAL | Age: 24
End: 2024-03-20
Payer: MEDICAID

## 2024-03-20 ENCOUNTER — HOSPITAL ENCOUNTER (OUTPATIENT)
Dept: GENERAL RADIOLOGY | Facility: HOSPITAL | Age: 24
Discharge: HOME | End: 2024-03-20
Admitting: FAMILY MEDICINE
Payer: MEDICAID

## 2024-03-20 DIAGNOSIS — S90.31XA CONTUSION OF RIGHT FOOT, INITIAL ENCOUNTER: ICD-10-CM

## 2024-03-20 DIAGNOSIS — S90.31XA CONTUSION OF RIGHT FOOT, INITIAL ENCOUNTER: Primary | ICD-10-CM

## 2024-03-20 PROCEDURE — 73630 X-RAY EXAM OF FOOT: CPT

## 2024-03-29 ENCOUNTER — TELEPHONE (OUTPATIENT)
Dept: OBSTETRICS AND GYNECOLOGY | Facility: CLINIC | Age: 24
End: 2024-03-29
Payer: MEDICAID

## 2024-03-29 NOTE — TELEPHONE ENCOUNTER
----- Message from Heather Lara CNM sent at 3/29/2024  1:21 PM EDT -----  Regarding: FW: Belly/back pains   Contact: [phone]  Can we call patient and get more information?   CHAPIN Jovel  ----- Message -----  From: Ann Romero MA  Sent: 3/29/2024   1:17 PM EDT  To: Heather Lara CNM  Subject: FW: Belly/back pains                               ----- Message -----  From: Huyen Interiano  Sent: 3/29/2024  12:33 PM EDT  To: Alyssa Ruffin [facility] Clinical Pool  Subject: Belly/back pains                                 I have these new pains in my stomach, they come when I'm working, feels like cramps, and also when I'm just turning over in bed or getting up. Sitting up hurts as well like a shocking kind of pain. My first pregancy so I don't know what's normal and what's not. I've tried the stretches you have suggested but with working has brought on these pains that are alot worse.

## 2024-03-29 NOTE — TELEPHONE ENCOUNTER
Spoke with pt and she stated that she has recently started working a new job and she is on her feet a lot more than what she had been.  She stated that it hurts to roll over in bed, stand, walk, etc. and she has pain in her lower abdomen that travels up to her belly button.

## 2024-03-30 ENCOUNTER — HOSPITAL ENCOUNTER (OUTPATIENT)
Facility: HOSPITAL | Age: 24
Setting detail: OBSERVATION
Discharge: HOME | End: 2024-03-30
Attending: OBSTETRICS & GYNECOLOGY | Admitting: OBSTETRICS & GYNECOLOGY
Payer: MEDICAID

## 2024-03-30 VITALS
RESPIRATION RATE: 18 BRPM | DIASTOLIC BLOOD PRESSURE: 87 MMHG | BODY MASS INDEX: 28.68 KG/M2 | HEART RATE: 117 BPM | WEIGHT: 168 LBS | OXYGEN SATURATION: 98 % | HEIGHT: 64 IN | TEMPERATURE: 98.3 F | SYSTOLIC BLOOD PRESSURE: 137 MMHG

## 2024-03-30 PROCEDURE — 59025 FETAL NON-STRESS TEST: CPT

## 2024-03-30 PROCEDURE — G0378 HOSPITAL OBSERVATION PER HR: HCPCS

## 2024-03-30 PROCEDURE — G0463 HOSPITAL OUTPT CLINIC VISIT: HCPCS

## 2024-03-30 PROCEDURE — 59025 FETAL NON-STRESS TEST: CPT | Performed by: OBSTETRICS & GYNECOLOGY

## 2024-03-30 PROCEDURE — 99221 1ST HOSP IP/OBS SF/LOW 40: CPT | Performed by: OBSTETRICS & GYNECOLOGY

## 2024-03-30 RX ORDER — METOCLOPRAMIDE HYDROCHLORIDE 5 MG/ML
10 INJECTION INTRAMUSCULAR; INTRAVENOUS ONCE
Status: DISCONTINUED | OUTPATIENT
Start: 2024-03-30 | End: 2024-03-30

## 2024-03-30 NOTE — LETTER
March 30, 2024     Patient: Huyen Interiano   YOB: 2000   Date of Visit: 3/30/2024       To Whom It May Concern:      It is my medical opinion that Huyen Interiano may return to work on Monday, April 1st  and be excused from work on March 30th and 31st.           Sincerely,            Estee Bustamante RN for Dr De Luna
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
## 2024-03-31 PROBLEM — S69.91XA HAND INJURY, RIGHT, INITIAL ENCOUNTER: Status: RESOLVED | Noted: 2022-07-28 | Resolved: 2024-03-31
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
## 2024-03-31 PROBLEM — D68.51 FACTOR V LEIDEN CARRIER: Status: RESOLVED | Noted: 2022-04-20 | Resolved: 2024-03-31

## 2024-03-31 PROBLEM — O26.899 RH NEGATIVE STATE IN ANTEPARTUM PERIOD: Status: ACTIVE | Noted: 2024-01-17

## 2024-03-31 PROBLEM — Z34.92 SECOND TRIMESTER PREGNANCY: Status: RESOLVED | Noted: 2024-02-09 | Resolved: 2024-03-31

## 2024-04-01 ENCOUNTER — TELEPHONE (OUTPATIENT)
Dept: OBSTETRICS AND GYNECOLOGY | Facility: CLINIC | Age: 24
End: 2024-04-01
Payer: MEDICAID

## 2024-04-01 NOTE — TELEPHONE ENCOUNTER
Spoke with pt and she stated that she does wear a pregnancy belt mainly for work.  She works at [employer] and isn't sure what they can do to accommodate her, but she is going to ask about getting extra breaks.  I advised her that if she needed anything from us to let us know.    Per Heather SANCHEZ, APRN:  Can you ensure she is using pregnancy belt and see if she can talk to her job about pregnancy accommodations, less standing etc, and we can fill out whatever forms she needs.  Heather

## 2024-04-01 NOTE — TELEPHONE ENCOUNTER
Having never met her it is hard to know exactly what this is from but most likely it is from untreated acid reflux at this gestational age.  Would confirm she is taking Pepcid twice daily and would also recommend taking Prilosec every morning first thing on an empty stomach.  If that does not help she is really getting need to come in to be seen and at the nausea and vomiting is persistent and may be through the hospitals labor delivery area to be evaluated

## 2024-04-01 NOTE — TELEPHONE ENCOUNTER
SILVINOS      Patient is nauseas and hasn't been able to keep any food down since yesterday evening.  She is able to keep water down, just not food. Please Advise    Pharmacy:  [facility]  Ph: [phone]  Fx:[phone]

## 2024-04-04 ENCOUNTER — ROUTINE PRENATAL (OUTPATIENT)
Dept: OBSTETRICS AND GYNECOLOGY | Facility: CLINIC | Age: 24
End: 2024-04-04
Payer: MEDICAID

## 2024-04-04 ENCOUNTER — LAB (OUTPATIENT)
Dept: LAB | Facility: HOSPITAL | Age: 24
End: 2024-04-04
Payer: MEDICAID

## 2024-04-04 VITALS — WEIGHT: 167 LBS | DIASTOLIC BLOOD PRESSURE: 76 MMHG | SYSTOLIC BLOOD PRESSURE: 122 MMHG | BODY MASS INDEX: 28.67 KG/M2

## 2024-04-04 DIAGNOSIS — O36.0120 ANTI-D ANTIBODIES PRESENT DURING PREGNANCY IN SECOND TRIMESTER, SINGLE OR UNSPECIFIED FETUS: ICD-10-CM

## 2024-04-04 DIAGNOSIS — Z67.91 RH NEGATIVE STATE IN ANTEPARTUM PERIOD: ICD-10-CM

## 2024-04-04 DIAGNOSIS — O26.899 RH NEGATIVE STATE IN ANTEPARTUM PERIOD: ICD-10-CM

## 2024-04-04 DIAGNOSIS — Z3A.25 25 WEEKS GESTATION OF PREGNANCY: ICD-10-CM

## 2024-04-04 DIAGNOSIS — Z34.82 PRENATAL CARE, SUBSEQUENT PREGNANCY IN SECOND TRIMESTER: Primary | ICD-10-CM

## 2024-04-04 PROCEDURE — 86803 HEPATITIS C AB TEST: CPT | Performed by: OBSTETRICS & GYNECOLOGY

## 2024-04-04 PROCEDURE — 80081 OBSTETRIC PANEL INC HIV TSTG: CPT | Performed by: OBSTETRICS & GYNECOLOGY

## 2024-04-04 RX ORDER — OMEPRAZOLE 20 MG/1
20 CAPSULE, DELAYED RELEASE ORAL DAILY
Start: 2024-04-04

## 2024-04-04 NOTE — PROGRESS NOTES
Chief Complaint   Patient presents with    Routine Prenatal Visit       HPI: Huyen is a  currently at 28w3d who today reports the following:  Contractions - No; Leaking - No; Vaginal bleeding - No; Heartburn - YES.    ROS:  GI: Nausea - YES ; Constipation - No; Diarrhea - No    Neuro: Headache - No ; Visual change - No    Respiratory: Cough - No; SOB - No; fever - No     EXAM:  Vitals: See prenatal flowsheet   Abdomen: See prenatal flowsheet   Urine glucose/protein: See prenatal flowsheet   Pelvic: See prenatal flowsheet     Lab Results   Component Value Date    ABO O 2023    RH Negative 2023    ABSCRN Abnormal (A) 2023       MDM:  Impression: Supervision of low risk pregnancy  Heterozygous factor V  Nausea most likely related to acid reflux untreated or suboptimally treated  Rh- with RhoGAM due today   Tests ordered/done today: Initial prenatal lab work ordered and done today   Counseling: Prenatal labs reviewed  Need to continue with PNV's  OTC medical options for her GERD   Tests ordered today for her next visit:   GCT        New Medications Ordered This Visit   Medications    omeprazole (priLOSEC) 20 MG capsule     Sig: Take 1 capsule by mouth Daily.

## 2024-04-11 ENCOUNTER — PATIENT OUTREACH (OUTPATIENT)
Dept: LABOR AND DELIVERY | Facility: HOSPITAL | Age: 24
End: 2024-04-11
Payer: MEDICAID

## 2024-04-11 NOTE — OUTREACH NOTE
"[facility]  Check-In    Current Estimated Gestational Age: 29w3d      Questions/Answers      Flowsheet Row Responses   Best Method for Contacting Cell   Demographics Reviewed Yes   Currently Employed No   Able to keep appointments as scheduled Yes   Gender(s) and Name(s) Boy \"River\"   Baby Active/Feeling Fetal Movemen Yes   How are you presently feeling? Good   Are you having any of the following symptoms? Other   Other Symptoms: Acid reflux   Questions regarding prenatal visits or tests to be ordered? No   Supplies ready for baby Car Seat   Resource/Environmental Concerns Financial, Reliable Transportation   Do you have any questions related to your care experience, your pregnancy, plans for delivery, any concerns, etc? No   Other Education [facility] Benefits, Insurance benefits/Incentives, Other, Transportation Assistance   Other Education Comment Breast feeding information, Kick counts            Feeling well and reports good fetal movement. Denies any concerning symptoms but discussed what to watch out for. Discussed that she still has time for childbirth education if she is interested. Discussed breastfeeding video and outpatient lactation clinic support as well as [facility] support. She has begun gathering items she will need for baby but is waiting until after her shower at the end of April to get the rest of the items she will need.    Updated resources provided via Hinge message. Contact information provided. Encouraged to call with questions, concerns, or for support. Will plan f/u around 35 weeks to ensure she has everything she needs in place and feels ready for delivery.    BATOOL Ash RN  Maternity Nurse Navigator    4/11/2024, 16:29 EDT          "

## 2024-04-18 ENCOUNTER — ROUTINE PRENATAL (OUTPATIENT)
Dept: OBSTETRICS AND GYNECOLOGY | Facility: CLINIC | Age: 24
End: 2024-04-18
Payer: MEDICAID

## 2024-04-18 VITALS — BODY MASS INDEX: 29.42 KG/M2 | DIASTOLIC BLOOD PRESSURE: 70 MMHG | SYSTOLIC BLOOD PRESSURE: 120 MMHG | WEIGHT: 171.4 LBS

## 2024-04-18 DIAGNOSIS — Z67.91 RH NEGATIVE STATE IN ANTEPARTUM PERIOD: ICD-10-CM

## 2024-04-18 DIAGNOSIS — Z3A.30 30 WEEKS GESTATION OF PREGNANCY: Primary | ICD-10-CM

## 2024-04-18 DIAGNOSIS — D68.51 FACTOR V LEIDEN MUTATION: ICD-10-CM

## 2024-04-18 DIAGNOSIS — O26.899 RH NEGATIVE STATE IN ANTEPARTUM PERIOD: ICD-10-CM

## 2024-04-18 DIAGNOSIS — Z34.83 PRENATAL CARE, SUBSEQUENT PREGNANCY IN THIRD TRIMESTER: ICD-10-CM

## 2024-04-18 NOTE — PROGRESS NOTES
Chief Complaint   Patient presents with    Routine Prenatal Visit     No c/c, pt declined Tdap vaccine today       HPI: Huyen is a  currently at 30w3d who today reports the following: She has not completed her GCT yet. She reports good fetal movement.   Contractions - No; Leaking - No; Vaginal bleeding -  No; Heartburn - No.    ROS:  GI: Nausea - No ; Constipation - No; Diarrhea - No    Neuro: Headache - YES  ; Visual change - No      EXAM:  Vitals: See prenatal flowsheet   Abdomen: See prenatal flowsheet   Urine glucose/protein: See prenatal flowsheet   Pelvic: See prenatal flowsheet     Lab Results   Component Value Date    HGB 12.7 2024    HCT 38.0 2024    ABO O 2024    RH Negative 2024    ABSCRN Negative 2024       MDM:  Impression: Supervision of low risk pregnancy  Heterozygous factor V    Tests done today: GCT   Topics discussed: Continue with PNV's  Prenatal labs reviewed   labor signs and symptoms  Symptoms of preeclampsia  Kick counts reviewed   Discussed need to complete gct, she states she will complete testing this week    Tests scheduled today for her next visit:   none

## 2024-04-30 ENCOUNTER — OFFICE VISIT (OUTPATIENT)
Dept: OBSTETRICS AND GYNECOLOGY | Facility: HOSPITAL | Age: 24
End: 2024-04-30
Payer: MEDICAID

## 2024-04-30 ENCOUNTER — HOSPITAL ENCOUNTER (OUTPATIENT)
Dept: WOMENS IMAGING | Facility: HOSPITAL | Age: 24
Discharge: HOME | End: 2024-04-30
Admitting: OBSTETRICS & GYNECOLOGY
Payer: MEDICAID

## 2024-04-30 ENCOUNTER — ROUTINE PRENATAL (OUTPATIENT)
Dept: OBSTETRICS AND GYNECOLOGY | Facility: CLINIC | Age: 24
End: 2024-04-30
Payer: MEDICAID

## 2024-04-30 VITALS — SYSTOLIC BLOOD PRESSURE: 120 MMHG | WEIGHT: 170 LBS | DIASTOLIC BLOOD PRESSURE: 80 MMHG | BODY MASS INDEX: 29.18 KG/M2

## 2024-04-30 VITALS — WEIGHT: 170 LBS | DIASTOLIC BLOOD PRESSURE: 83 MMHG | SYSTOLIC BLOOD PRESSURE: 133 MMHG | BODY MASS INDEX: 29.18 KG/M2

## 2024-04-30 DIAGNOSIS — Z34.83 PRENATAL CARE, SUBSEQUENT PREGNANCY IN THIRD TRIMESTER: ICD-10-CM

## 2024-04-30 DIAGNOSIS — O36.0120 ANTI-D ANTIBODIES PRESENT DURING PREGNANCY IN SECOND TRIMESTER, SINGLE OR UNSPECIFIED FETUS: ICD-10-CM

## 2024-04-30 DIAGNOSIS — Z34.90 PREGNANCY, UNSPECIFIED GESTATIONAL AGE: ICD-10-CM

## 2024-04-30 DIAGNOSIS — Z3A.32 32 WEEKS GESTATION OF PREGNANCY: Primary | ICD-10-CM

## 2024-04-30 DIAGNOSIS — D68.51 FACTOR V LEIDEN MUTATION: ICD-10-CM

## 2024-04-30 DIAGNOSIS — Z67.91 RH NEGATIVE STATE IN ANTEPARTUM PERIOD: ICD-10-CM

## 2024-04-30 DIAGNOSIS — O26.899 RH NEGATIVE STATE IN ANTEPARTUM PERIOD: ICD-10-CM

## 2024-04-30 DIAGNOSIS — D68.51 FACTOR V LEIDEN MUTATION: Primary | ICD-10-CM

## 2024-04-30 LAB
COLOR UR: YELLOW
GLUCOSE UR STRIP-MCNC: NEGATIVE MG/DL
PROT UR STRIP-MCNC: NEGATIVE MG/DL

## 2024-04-30 PROCEDURE — 76819 FETAL BIOPHYS PROFIL W/O NST: CPT

## 2024-04-30 PROCEDURE — 76816 OB US FOLLOW-UP PER FETUS: CPT

## 2024-04-30 PROCEDURE — 99213 OFFICE O/P EST LOW 20 MIN: CPT | Performed by: NURSE PRACTITIONER

## 2024-04-30 RX ORDER — BLOOD-GLUCOSE METER
1 KIT MISCELLANEOUS AS NEEDED
Qty: 1 EACH | Refills: 0 | Status: SHIPPED | OUTPATIENT
Start: 2024-04-30

## 2024-04-30 NOTE — PROGRESS NOTES
Patient has an appointment with Dr. Thompson's office today. NIPT declined. Patient denies contractions, vaginal bleeding, and loss of fluid.

## 2024-04-30 NOTE — PROGRESS NOTES
Chief Complaint   Patient presents with    Routine Prenatal Visit     Ob follow up after PDC       HPI: Huyen is a  currently at 32w1d who today reports the following: She reports good fetal movement. She [facility]s at [facility] today. She would like to decline gct testing, is open to monitoring blood sugars for 2 weeks.   Contractions - No; Leaking - No; Vaginal bleeding -  No; Swelling of extremities -  mild at end of day, ble .    ROS:  GI: Nausea - No; Constipation - No; Diarrhea - No    Neuro: Headache - No; Visual change - No      EXAM:  Vitals: See prenatal flowsheet   Abdomen: See prenatal flowsheet   Urine glucose/protein: See prenatal flowsheet   Pelvic: See prenatal flowsheet   MDM:   Impression: Supervision of high risk pregnancy  Rh negative  Factor V Leiden mutation - heterozygous - started ASA 81 mg, consider ppx anticoagulation pp    Tests done today: U/S done at [facility] for efw, normal growth, bpp     Topics discussed: Continue with PNV's  Prenatal labs reviewed   labor signs and symptoms  Symptoms of preeclampsia  Kick counts reviewed  Discussed blood sugar monitoring.    Tests scheduled today for her next visit:   none

## 2024-05-01 ENCOUNTER — PATIENT MESSAGE (OUTPATIENT)
Dept: OBSTETRICS AND GYNECOLOGY | Facility: CLINIC | Age: 24
End: 2024-05-01
Payer: MEDICAID

## 2024-05-01 NOTE — TELEPHONE ENCOUNTER
From: Huyen Interiano  To: Heather Lara  Sent: 5/1/2024 3:17 PM EDT  Subject: Fall    I went to sit on a swing and it broke causing me to fall on my butt. I was cramping a bit in my tr1yncs and back but if I sat down I was fine but walking made it worse. He hasn't moved since then as well but I can feel his hiccups. And some pain in my right side back. Wasn't sure if I should go in to L&D b/c I didn't fall far off the ground

## 2024-05-01 NOTE — TELEPHONE ENCOUNTER
Called and spoke with patient, she states that the fall happened an hour or so ago, she has not really felt baby move since and this time of day is normally an active time for baby.  She also reports severe lower back pain since fall.  Advised patient to be seen and evaluated at  L&D right away, she verified understanding and is going there now.

## 2024-05-09 ENCOUNTER — PATIENT MESSAGE (OUTPATIENT)
Dept: OBSTETRICS AND GYNECOLOGY | Facility: CLINIC | Age: 24
End: 2024-05-09
Payer: MEDICAID

## 2024-05-21 ENCOUNTER — PATIENT OUTREACH (OUTPATIENT)
Dept: LABOR AND DELIVERY | Facility: HOSPITAL | Age: 24
End: 2024-05-21
Payer: MEDICAID

## 2024-05-21 NOTE — OUTREACH NOTE
"[facility]  Check-In    Current Estimated Gestational Age: 35w1d      Questions/Answers      Flowsheet Row Responses   Best Method for Contacting Cell   Demographics Reviewed Yes   Currently Employed No   Able to keep appointments as scheduled Yes   Gender(s) and Name(s) Boy \"River\"   Baby Active/Feeling Fetal Movemen Yes   How are you presently feeling? Tired   Are you having any of the following symptoms? Pressure   Questions regarding prenatal visits or tests to be ordered? No   Equipment presently used at home: Glucometer  [Did glucose checks rather than GCT. Results have been fine she says.]   Education related to new diagnoses/home equipment No   Special Considerations Birthing Ball, Birth Plan, Other   Other Considerations Natural   Supplies ready for baby Car Seat, Clothing, Crib, Diapers   Resource/Environmental Concerns Financial, Reliable Transportation   Do you have any questions related to your care experience, your pregnancy, plans for delivery, any concerns, etc? No   Other Education Birth Plan, How to find a pediatrician, Insurance benefits/Incentives, Meds to Beds, Mental Health Services, [facility] Benefits            Feeling well and reports good fetal movement. Discussed what to expect when she goes in to L&D including admission process, comfort measures, positioning, pain medication/Fentanyl, epidural placement, delivery, skin to skin, breastfeeding, post-delivery, and MB care. VU.    She is feeling ready for delivery. She feels like she has everything she will need for baby.    Baseline EPDS done=12. States she has been feeling a little anxious but that she actually feels better now than she did earlier in the pregnancy. No SI/HI. Declines referral for counseling. States she has had counseling before and it doesn't really help. Dr. Thompson notified.    Updated resources provided via Myrio message. Contact information provided. Encouraged to call with questions, concerns, or for support. " Will plan to see inpatient after delivery.    BATOOL Ash RN  Maternity Nurse Navigator    5/21/2024, 14:38 EDT

## 2024-05-24 RX ORDER — BLOOD SUGAR DIAGNOSTIC
STRIP MISCELLANEOUS
Qty: 100 EACH | Refills: 1 | Status: SHIPPED | OUTPATIENT
Start: 2024-05-24

## 2024-05-24 NOTE — TELEPHONE ENCOUNTER
Will refill request but patient is not scheduled for appointment and has not been seen in office since she was 32 weeks pregnant- will need appointment next week as she is almost 36 weeks pregnant. Please call and schedule patient.

## 2024-05-29 ENCOUNTER — ROUTINE PRENATAL (OUTPATIENT)
Dept: OBSTETRICS AND GYNECOLOGY | Facility: CLINIC | Age: 24
End: 2024-05-29
Payer: MEDICAID

## 2024-05-29 VITALS — SYSTOLIC BLOOD PRESSURE: 118 MMHG | WEIGHT: 170 LBS | BODY MASS INDEX: 29.18 KG/M2 | DIASTOLIC BLOOD PRESSURE: 70 MMHG

## 2024-05-29 DIAGNOSIS — Z34.83 PRENATAL CARE, SUBSEQUENT PREGNANCY IN THIRD TRIMESTER: Primary | ICD-10-CM

## 2024-05-29 DIAGNOSIS — D68.51 FACTOR V LEIDEN MUTATION: ICD-10-CM

## 2024-05-29 NOTE — PROGRESS NOTES
Chief Complaint   Patient presents with    Routine Prenatal Visit       HPI: Huyen is a  currently at 36w2d who today reports the following:  Contractions - No; Leaking - No; Vaginal bleeding -  No; Swelling of extremities - No.    ROS:  GI: Nausea - No; Constipation - No; Diarrhea - No    Neuro: Headache - No; Visual change - No      EXAM:  Vitals: See prenatal flowsheet   Abdomen: See prenatal flowsheet   Urine glucose/protein: See prenatal flowsheet   Pelvic: See prenatal flowsheet   MDM:   Impression: Supervision of high risk pregnancy  Rh negative s/p rhogam on 24  Factor V Leiden mutation - heterozygous - started ASA 81 mg, consider ppx anticoagulation pp    Tests done today: GBS testing   Topics discussed: Continue with PNV's  Prenatal labs reviewed   labor signs and symptoms  Symptoms of preeclampsia  TDAP vaccination recommended between 27-36 weeks gestation OR after birth  Continue LDASA   Tests scheduled today for her next visit:   none

## 2024-06-02 ENCOUNTER — HOSPITAL ENCOUNTER (OUTPATIENT)
Facility: HOSPITAL | Age: 24
Discharge: HOME | End: 2024-06-02
Attending: OBSTETRICS & GYNECOLOGY | Admitting: OBSTETRICS & GYNECOLOGY
Payer: MEDICAID

## 2024-06-02 VITALS
WEIGHT: 170 LBS | HEART RATE: 121 BPM | BODY MASS INDEX: 29.02 KG/M2 | RESPIRATION RATE: 16 BRPM | DIASTOLIC BLOOD PRESSURE: 86 MMHG | TEMPERATURE: 98.3 F | HEIGHT: 64 IN | OXYGEN SATURATION: 97 % | SYSTOLIC BLOOD PRESSURE: 127 MMHG

## 2024-06-02 PROBLEM — O47.03 FALSE LABOR BEFORE 37 COMPLETED WEEKS OF GESTATION DURING PREGNANCY IN THIRD TRIMESTER, ANTEPARTUM: Status: ACTIVE | Noted: 2024-06-02

## 2024-06-02 LAB
ALP SERPL-CCNC: 115 U/L (ref 39–117)
ALT SERPL W P-5'-P-CCNC: 8 U/L (ref 1–33)
AST SERPL-CCNC: 19 U/L (ref 1–32)
BACTERIA UR QL AUTO: NORMAL /HPF
BASOPHILS # BLD AUTO: 0.02 10*3/MM3 (ref 0–0.2)
BASOPHILS NFR BLD AUTO: 0.2 % (ref 0–1.5)
BILIRUB SERPL-MCNC: 0.2 MG/DL (ref 0–1.2)
BILIRUB UR QL STRIP: NEGATIVE
CLARITY UR: ABNORMAL
COLOR UR: YELLOW
CREAT SERPL-MCNC: 0.51 MG/DL (ref 0.57–1)
DEPRECATED RDW RBC AUTO: 40 FL (ref 37–54)
EOSINOPHIL # BLD AUTO: 0.03 10*3/MM3 (ref 0–0.4)
EOSINOPHIL NFR BLD AUTO: 0.3 % (ref 0.3–6.2)
ERYTHROCYTE [DISTWIDTH] IN BLOOD BY AUTOMATED COUNT: 12.2 % (ref 12.3–15.4)
GLUCOSE UR STRIP-MCNC: NEGATIVE MG/DL
HCT VFR BLD AUTO: 37 % (ref 34–46.6)
HGB BLD-MCNC: 12.1 G/DL (ref 12–15.9)
HGB UR QL STRIP.AUTO: NEGATIVE
HYALINE CASTS UR QL AUTO: NORMAL /LPF
IMM GRANULOCYTES # BLD AUTO: 0.02 10*3/MM3 (ref 0–0.05)
IMM GRANULOCYTES NFR BLD AUTO: 0.2 % (ref 0–0.5)
KETONES UR QL STRIP: NEGATIVE
LDH SERPL-CCNC: 162 U/L (ref 135–214)
LEUKOCYTE ESTERASE UR QL STRIP.AUTO: NEGATIVE
LYMPHOCYTES # BLD AUTO: 1.71 10*3/MM3 (ref 0.7–3.1)
LYMPHOCYTES NFR BLD AUTO: 16.9 % (ref 19.6–45.3)
MCH RBC QN AUTO: 29.1 PG (ref 26.6–33)
MCHC RBC AUTO-ENTMCNC: 32.7 G/DL (ref 31.5–35.7)
MCV RBC AUTO: 88.9 FL (ref 79–97)
MONOCYTES # BLD AUTO: 1.13 10*3/MM3 (ref 0.1–0.9)
MONOCYTES NFR BLD AUTO: 11.2 % (ref 5–12)
NEUTROPHILS NFR BLD AUTO: 7.2 10*3/MM3 (ref 1.7–7)
NEUTROPHILS NFR BLD AUTO: 71.2 % (ref 42.7–76)
NITRITE UR QL STRIP: NEGATIVE
NRBC BLD AUTO-RTO: 0 /100 WBC (ref 0–0.2)
PH UR STRIP.AUTO: 7.5 [PH] (ref 5–8)
PLATELET # BLD AUTO: 162 10*3/MM3 (ref 140–450)
PMV BLD AUTO: 10.8 FL (ref 6–12)
PROT UR QL STRIP: NEGATIVE
RBC # BLD AUTO: 4.16 10*6/MM3 (ref 3.77–5.28)
RBC # UR STRIP: NORMAL /HPF
REF LAB TEST METHOD: NORMAL
SP GR UR STRIP: 1.01 (ref 1–1.03)
SQUAMOUS #/AREA URNS HPF: NORMAL /HPF
URATE SERPL-MCNC: 3.4 MG/DL (ref 2.4–5.7)
UROBILINOGEN UR QL STRIP: ABNORMAL
WBC # UR STRIP: NORMAL /HPF
WBC NRBC COR # BLD AUTO: 10.11 10*3/MM3 (ref 3.4–10.8)

## 2024-06-02 PROCEDURE — 84450 TRANSFERASE (AST) (SGOT): CPT | Performed by: OBSTETRICS & GYNECOLOGY

## 2024-06-02 PROCEDURE — 59025 FETAL NON-STRESS TEST: CPT

## 2024-06-02 PROCEDURE — 84460 ALANINE AMINO (ALT) (SGPT): CPT | Performed by: OBSTETRICS & GYNECOLOGY

## 2024-06-02 PROCEDURE — 84075 ASSAY ALKALINE PHOSPHATASE: CPT | Performed by: OBSTETRICS & GYNECOLOGY

## 2024-06-02 PROCEDURE — 85025 COMPLETE CBC W/AUTO DIFF WBC: CPT | Performed by: OBSTETRICS & GYNECOLOGY

## 2024-06-02 PROCEDURE — 81001 URINALYSIS AUTO W/SCOPE: CPT | Performed by: OBSTETRICS & GYNECOLOGY

## 2024-06-02 PROCEDURE — 82565 ASSAY OF CREATININE: CPT | Performed by: OBSTETRICS & GYNECOLOGY

## 2024-06-02 PROCEDURE — G0463 HOSPITAL OUTPT CLINIC VISIT: HCPCS

## 2024-06-02 PROCEDURE — 83615 LACTATE (LD) (LDH) ENZYME: CPT | Performed by: OBSTETRICS & GYNECOLOGY

## 2024-06-02 PROCEDURE — 82247 BILIRUBIN TOTAL: CPT | Performed by: OBSTETRICS & GYNECOLOGY

## 2024-06-02 PROCEDURE — 59025 FETAL NON-STRESS TEST: CPT | Performed by: OBSTETRICS & GYNECOLOGY

## 2024-06-02 PROCEDURE — 84550 ASSAY OF BLOOD/URIC ACID: CPT | Performed by: OBSTETRICS & GYNECOLOGY

## 2024-06-02 PROCEDURE — 99221 1ST HOSP IP/OBS SF/LOW 40: CPT | Performed by: OBSTETRICS & GYNECOLOGY

## 2024-06-02 PROCEDURE — 36415 COLL VENOUS BLD VENIPUNCTURE: CPT | Performed by: OBSTETRICS & GYNECOLOGY

## 2024-06-02 NOTE — H&P
"Huyen Interiano  2000  [id number]  [id number]    CC: contractions  HPI:  Patient is 23 y.o. female   currently at 36w6d presents with c/o contractions, onset ~2200, intermittent, no assoc bleeding or leaking.  Good FM.  BPNC to date.  Pt has had hx of mildly elevated BP's on prior visits.     PMH:  Current meds: PNV, baby ASA  Illnesses: factor V (heterozygote, pt tested because her mother has factor V and has   Hx of clots)  Surgeries: oral surg  Allergies: PCN- hives (childhood)    Past OB History:       OB History    Para Term  AB Living   2 0 0 0 1 0   SAB IAB Ectopic Molar Multiple Live Births   1 0 0 0 0 0      # Outcome Date GA Lbr Cole/2nd Weight Sex Type Anes PTL Lv   2 Current            1 2021              Obstetric Comments   FOB - Jamey    - chemical pregnancy       Fob#1 pregnancy #1   Fob#2 pregnancy #2      No history of STIs   Has not had the hpv vaccine her understanding             SH: tob neg , EtOH neg, drugs neg      General ROS: contractions, N.   All other systems reviewed and are negative.      Physical Examination: General appearance - alert, well appearing, and in no distress  Vital signs - /86   Pulse (!) 121   Temp 98.3 °F (36.8 °C) (Oral)   Resp 16   Ht 162.6 cm (64\")   Wt 77.1 kg (170 lb)   LMP 2023 (Exact Date)   SpO2 97%   BMI 29.18 kg/m²   HEENT: normocephalic, atraumatic,oropharynx clear, appearance of ears and nose normal  Neck - supple, no significant adenopathy, no thyromegaly  Lymphatics - no palpable lymphadenopathy in the neck or groin, no hepatosplenomegaly  Chest - clear to auscultation, no wheezes, rales or rhonchi, respiratory effort non-labored  Heart - normal rate, regular rhythm, no murmurs, rubs, clicks or gallops, no JVD, no lower extremity edema  Abdomen - soft, nontender, nondistended, no masses, no hepatosplenomegaly  no rebound tenderness noted, bowel sounds normal  Vaginal Exam: 1/20%/-3, no blood in " vault,external genitalia normal (per RN)  Extremities - no pedal edema noted, no calf tend  Skin -warm and dry, normal coloration and turgor, no rashes, no suspicious skin lesions noted      Labs:  Results from last 7 days   Lab Units 06/02/24  0127   WBC 10*3/mm3 10.11   HEMOGLOBIN g/dL 12.1   HEMATOCRIT % 37.0   PLATELETS 10*3/mm3 162     Results from last 7 days   Lab Units 06/02/24  0127   CREATININE mg/dL 0.51*   BILIRUBIN mg/dL 0.2   ALK PHOS U/L 115   ALT (SGPT) U/L 8   AST (SGOT) U/L 19     Fetal monitoring: indication contractions, onset 0052, offset 0220, baseline 140, mod BTB variability, multiple accels (15 X 15), no decels, irreg contractions, interpretation reactive NST    Radiology     Assessment 1)IUP 36 6/7 weeks   2)false labor   3)gest HTN- no severe symptoms, labs normal   4)factor V mutation (heterozygote)    Plan 1)observe   2)home   3)return tonight at 0030 (Monday am) for induction    Reyes Zelaya MD  6/2/2024  02:23 EDT

## 2024-06-02 NOTE — NURSING NOTE
D/C instructions reviewed with pt. Pt americo and has no questions. Ambulated off unit in stable condition accompanied by FOB. Understands to return for IOL.

## 2024-06-03 ENCOUNTER — ANESTHESIA (OUTPATIENT)
Dept: LABOR AND DELIVERY | Facility: HOSPITAL | Age: 24
End: 2024-06-03
Payer: MEDICAID

## 2024-06-03 ENCOUNTER — HOSPITAL ENCOUNTER (OUTPATIENT)
Dept: LABOR AND DELIVERY | Facility: HOSPITAL | Age: 24
Discharge: HOME | End: 2024-06-03
Payer: MEDICAID

## 2024-06-03 ENCOUNTER — HOSPITAL ENCOUNTER (INPATIENT)
Facility: HOSPITAL | Age: 24
LOS: 2 days | Discharge: HOME | End: 2024-06-05
Attending: OBSTETRICS & GYNECOLOGY | Admitting: OBSTETRICS & GYNECOLOGY
Payer: MEDICAID

## 2024-06-03 ENCOUNTER — ANESTHESIA EVENT (OUTPATIENT)
Dept: LABOR AND DELIVERY | Facility: HOSPITAL | Age: 24
End: 2024-06-03
Payer: MEDICAID

## 2024-06-03 PROBLEM — O99.341 ANXIETY IN PREGNANCY IN FIRST TRIMESTER, ANTEPARTUM: Status: RESOLVED | Noted: 2023-11-21 | Resolved: 2024-06-03

## 2024-06-03 PROBLEM — O13.3 GESTATIONAL HYPERTENSION WITHOUT SIGNIFICANT PROTEINURIA IN THIRD TRIMESTER: Status: ACTIVE | Noted: 2024-06-03

## 2024-06-03 PROBLEM — O47.03 FALSE LABOR BEFORE 37 COMPLETED WEEKS OF GESTATION DURING PREGNANCY IN THIRD TRIMESTER, ANTEPARTUM: Status: RESOLVED | Noted: 2024-06-02 | Resolved: 2024-06-03

## 2024-06-03 PROBLEM — F41.9 ANXIETY IN PREGNANCY IN FIRST TRIMESTER, ANTEPARTUM: Status: RESOLVED | Noted: 2023-11-21 | Resolved: 2024-06-03

## 2024-06-03 PROBLEM — Z34.83 PRENATAL CARE, SUBSEQUENT PREGNANCY IN THIRD TRIMESTER: Status: RESOLVED | Noted: 2023-11-21 | Resolved: 2024-06-03

## 2024-06-03 LAB
ABO GROUP BLD: NORMAL
ALP SERPL-CCNC: 113 U/L (ref 39–117)
ALT SERPL W P-5'-P-CCNC: 9 U/L (ref 1–33)
AST SERPL-CCNC: 15 U/L (ref 1–32)
BASOPHILS # BLD AUTO: 0.02 10*3/MM3 (ref 0–0.2)
BASOPHILS NFR BLD AUTO: 0.3 % (ref 0–1.5)
BILIRUB SERPL-MCNC: 0.2 MG/DL (ref 0–1.2)
BLD GP AB SCN SERPL QL: POSITIVE
CREAT SERPL-MCNC: 0.51 MG/DL (ref 0.57–1)
DEPRECATED RDW RBC AUTO: 40 FL (ref 37–54)
EOSINOPHIL # BLD AUTO: 0.04 10*3/MM3 (ref 0–0.4)
EOSINOPHIL NFR BLD AUTO: 0.6 % (ref 0.3–6.2)
ERYTHROCYTE [DISTWIDTH] IN BLOOD BY AUTOMATED COUNT: 12.4 % (ref 12.3–15.4)
HCT VFR BLD AUTO: 35.9 % (ref 34–46.6)
HGB BLD-MCNC: 12 G/DL (ref 12–15.9)
IMM GRANULOCYTES # BLD AUTO: 0.02 10*3/MM3 (ref 0–0.05)
IMM GRANULOCYTES NFR BLD AUTO: 0.3 % (ref 0–0.5)
LDH SERPL-CCNC: 137 U/L (ref 135–214)
LYMPHOCYTES # BLD AUTO: 2.2 10*3/MM3 (ref 0.7–3.1)
LYMPHOCYTES NFR BLD AUTO: 31.7 % (ref 19.6–45.3)
MCH RBC QN AUTO: 29.6 PG (ref 26.6–33)
MCHC RBC AUTO-ENTMCNC: 33.4 G/DL (ref 31.5–35.7)
MCV RBC AUTO: 88.4 FL (ref 79–97)
MONOCYTES # BLD AUTO: 0.74 10*3/MM3 (ref 0.1–0.9)
MONOCYTES NFR BLD AUTO: 10.6 % (ref 5–12)
NEUTROPHILS NFR BLD AUTO: 3.93 10*3/MM3 (ref 1.7–7)
NEUTROPHILS NFR BLD AUTO: 56.5 % (ref 42.7–76)
NRBC BLD AUTO-RTO: 0 /100 WBC (ref 0–0.2)
PLATELET # BLD AUTO: 153 10*3/MM3 (ref 140–450)
PMV BLD AUTO: 11.2 FL (ref 6–12)
RBC # BLD AUTO: 4.06 10*6/MM3 (ref 3.77–5.28)
RESIDUAL RHIG DETECTED: NORMAL
RH BLD: NEGATIVE
T PALLIDUM IGG SER QL: NORMAL
T&S EXPIRATION DATE: NORMAL
URATE SERPL-MCNC: 3.7 MG/DL (ref 2.4–5.7)
WBC NRBC COR # BLD AUTO: 6.95 10*3/MM3 (ref 3.4–10.8)

## 2024-06-03 PROCEDURE — 51701 INSERT BLADDER CATHETER: CPT

## 2024-06-03 PROCEDURE — 25010000002 ROPIVACAINE PER 1 MG: Performed by: NURSE ANESTHETIST, CERTIFIED REGISTERED

## 2024-06-03 PROCEDURE — C1755 CATHETER, INTRASPINAL: HCPCS | Performed by: ANESTHESIOLOGY

## 2024-06-03 PROCEDURE — 85027 COMPLETE CBC AUTOMATED: CPT | Performed by: OBSTETRICS & GYNECOLOGY

## 2024-06-03 PROCEDURE — 36415 COLL VENOUS BLD VENIPUNCTURE: CPT | Performed by: OBSTETRICS & GYNECOLOGY

## 2024-06-03 PROCEDURE — 86870 RBC ANTIBODY IDENTIFICATION: CPT | Performed by: OBSTETRICS & GYNECOLOGY

## 2024-06-03 PROCEDURE — 86901 BLOOD TYPING SEROLOGIC RH(D): CPT | Performed by: OBSTETRICS & GYNECOLOGY

## 2024-06-03 PROCEDURE — 86850 RBC ANTIBODY SCREEN: CPT | Performed by: OBSTETRICS & GYNECOLOGY

## 2024-06-03 PROCEDURE — 84550 ASSAY OF BLOOD/URIC ACID: CPT | Performed by: OBSTETRICS & GYNECOLOGY

## 2024-06-03 PROCEDURE — 93010 ELECTROCARDIOGRAM REPORT: CPT | Performed by: INTERNAL MEDICINE

## 2024-06-03 PROCEDURE — 84075 ASSAY ALKALINE PHOSPHATASE: CPT | Performed by: OBSTETRICS & GYNECOLOGY

## 2024-06-03 PROCEDURE — 59025 FETAL NON-STRESS TEST: CPT | Performed by: OBSTETRICS & GYNECOLOGY

## 2024-06-03 PROCEDURE — 86900 BLOOD TYPING SEROLOGIC ABO: CPT | Performed by: OBSTETRICS & GYNECOLOGY

## 2024-06-03 PROCEDURE — 25810000003 LACTATED RINGERS PER 1000 ML: Performed by: OBSTETRICS & GYNECOLOGY

## 2024-06-03 PROCEDURE — 0HQ9XZZ REPAIR PERINEUM SKIN, EXTERNAL APPROACH: ICD-10-PCS | Performed by: OBSTETRICS & GYNECOLOGY

## 2024-06-03 PROCEDURE — 82565 ASSAY OF CREATININE: CPT | Performed by: OBSTETRICS & GYNECOLOGY

## 2024-06-03 PROCEDURE — 3E033VJ INTRODUCTION OF OTHER HORMONE INTO PERIPHERAL VEIN, PERCUTANEOUS APPROACH: ICD-10-PCS | Performed by: OBSTETRICS & GYNECOLOGY

## 2024-06-03 PROCEDURE — 84450 TRANSFERASE (AST) (SGOT): CPT | Performed by: OBSTETRICS & GYNECOLOGY

## 2024-06-03 PROCEDURE — 25810000003 LACTATED RINGERS SOLUTION: Performed by: OBSTETRICS & GYNECOLOGY

## 2024-06-03 PROCEDURE — 51703 INSERT BLADDER CATH COMPLEX: CPT

## 2024-06-03 PROCEDURE — 10907ZC DRAINAGE OF AMNIOTIC FLUID, THERAPEUTIC FROM PRODUCTS OF CONCEPTION, VIA NATURAL OR ARTIFICIAL OPENING: ICD-10-PCS | Performed by: OBSTETRICS & GYNECOLOGY

## 2024-06-03 PROCEDURE — C1755 CATHETER, INTRASPINAL: HCPCS

## 2024-06-03 PROCEDURE — 86780 TREPONEMA PALLIDUM: CPT | Performed by: OBSTETRICS & GYNECOLOGY

## 2024-06-03 PROCEDURE — 99222 1ST HOSP IP/OBS MODERATE 55: CPT | Performed by: OBSTETRICS & GYNECOLOGY

## 2024-06-03 PROCEDURE — 59025 FETAL NON-STRESS TEST: CPT

## 2024-06-03 PROCEDURE — 25010000002 FENTANYL CITRATE (PF) 50 MCG/ML SOLUTION: Performed by: NURSE ANESTHETIST, CERTIFIED REGISTERED

## 2024-06-03 PROCEDURE — 84460 ALANINE AMINO (ALT) (SGPT): CPT | Performed by: OBSTETRICS & GYNECOLOGY

## 2024-06-03 PROCEDURE — 59410 OBSTETRICAL CARE: CPT | Performed by: OBSTETRICS & GYNECOLOGY

## 2024-06-03 PROCEDURE — 82247 BILIRUBIN TOTAL: CPT | Performed by: OBSTETRICS & GYNECOLOGY

## 2024-06-03 PROCEDURE — 83615 LACTATE (LD) (LDH) ENZYME: CPT | Performed by: OBSTETRICS & GYNECOLOGY

## 2024-06-03 PROCEDURE — 93005 ELECTROCARDIOGRAM TRACING: CPT | Performed by: OBSTETRICS & GYNECOLOGY

## 2024-06-03 PROCEDURE — 85025 COMPLETE CBC W/AUTO DIFF WBC: CPT | Performed by: OBSTETRICS & GYNECOLOGY

## 2024-06-03 RX ORDER — LIDOCAINE HCL/EPINEPHRINE/PF 2%-1:200K
VIAL (ML) INJECTION AS NEEDED
Status: DISCONTINUED | OUTPATIENT
Start: 2024-06-03 | End: 2024-06-03 | Stop reason: SURG

## 2024-06-03 RX ORDER — MISOPROSTOL 200 UG/1
800 TABLET ORAL AS NEEDED
Status: CANCELLED | OUTPATIENT
Start: 2024-06-03

## 2024-06-03 RX ORDER — OXYTOCIN/0.9 % SODIUM CHLORIDE 30/500 ML
2-20 PLASTIC BAG, INJECTION (ML) INTRAVENOUS
Status: DISCONTINUED | OUTPATIENT
Start: 2024-06-03 | End: 2024-06-04 | Stop reason: HOSPADM

## 2024-06-03 RX ORDER — LIDOCAINE HYDROCHLORIDE 10 MG/ML
0.5 INJECTION, SOLUTION EPIDURAL; INFILTRATION; INTRACAUDAL; PERINEURAL ONCE AS NEEDED
Status: DISCONTINUED | OUTPATIENT
Start: 2024-06-03 | End: 2024-06-04 | Stop reason: HOSPADM

## 2024-06-03 RX ORDER — METHYLERGONOVINE MALEATE 0.2 MG/ML
200 INJECTION INTRAVENOUS ONCE AS NEEDED
Status: CANCELLED | OUTPATIENT
Start: 2024-06-03

## 2024-06-03 RX ORDER — CARBOPROST TROMETHAMINE 250 UG/ML
250 INJECTION, SOLUTION INTRAMUSCULAR AS NEEDED
Status: CANCELLED | OUTPATIENT
Start: 2024-06-03

## 2024-06-03 RX ORDER — DIPHENHYDRAMINE HYDROCHLORIDE 50 MG/ML
12.5 INJECTION INTRAMUSCULAR; INTRAVENOUS EVERY 8 HOURS PRN
Status: DISCONTINUED | OUTPATIENT
Start: 2024-06-03 | End: 2024-06-04 | Stop reason: HOSPADM

## 2024-06-03 RX ORDER — ONDANSETRON 2 MG/ML
4 INJECTION INTRAMUSCULAR; INTRAVENOUS EVERY 6 HOURS PRN
Status: DISCONTINUED | OUTPATIENT
Start: 2024-06-03 | End: 2024-06-04 | Stop reason: HOSPADM

## 2024-06-03 RX ORDER — OXYTOCIN/0.9 % SODIUM CHLORIDE 30/500 ML
250 PLASTIC BAG, INJECTION (ML) INTRAVENOUS CONTINUOUS
Status: CANCELLED | OUTPATIENT
Start: 2024-06-03 | End: 2024-06-03

## 2024-06-03 RX ORDER — FENTANYL CITRATE 50 UG/ML
INJECTION, SOLUTION INTRAMUSCULAR; INTRAVENOUS AS NEEDED
Status: DISCONTINUED | OUTPATIENT
Start: 2024-06-03 | End: 2024-06-03 | Stop reason: SURG

## 2024-06-03 RX ORDER — ONDANSETRON 2 MG/ML
4 INJECTION INTRAMUSCULAR; INTRAVENOUS ONCE AS NEEDED
Status: DISCONTINUED | OUTPATIENT
Start: 2024-06-03 | End: 2024-06-04 | Stop reason: HOSPADM

## 2024-06-03 RX ORDER — ACETAMINOPHEN 325 MG/1
650 TABLET ORAL EVERY 4 HOURS PRN
Status: DISCONTINUED | OUTPATIENT
Start: 2024-06-03 | End: 2024-06-04 | Stop reason: HOSPADM

## 2024-06-03 RX ORDER — EPHEDRINE SULFATE 5 MG/ML
10 INJECTION INTRAVENOUS
Status: DISCONTINUED | OUTPATIENT
Start: 2024-06-03 | End: 2024-06-04 | Stop reason: HOSPADM

## 2024-06-03 RX ORDER — FENTANYL CITRATE 50 UG/ML
50 INJECTION, SOLUTION INTRAMUSCULAR; INTRAVENOUS
Status: DISCONTINUED | OUTPATIENT
Start: 2024-06-03 | End: 2024-06-04 | Stop reason: HOSPADM

## 2024-06-03 RX ORDER — SODIUM CHLORIDE 0.9 % (FLUSH) 0.9 %
10 SYRINGE (ML) INJECTION EVERY 12 HOURS SCHEDULED
Status: DISCONTINUED | OUTPATIENT
Start: 2024-06-03 | End: 2024-06-04 | Stop reason: HOSPADM

## 2024-06-03 RX ORDER — LIDOCAINE HYDROCHLORIDE AND EPINEPHRINE 15; 5 MG/ML; UG/ML
INJECTION, SOLUTION EPIDURAL AS NEEDED
Status: DISCONTINUED | OUTPATIENT
Start: 2024-06-03 | End: 2024-06-03 | Stop reason: SURG

## 2024-06-03 RX ORDER — MAGNESIUM CARB/ALUMINUM HYDROX 105-160MG
30 TABLET,CHEWABLE ORAL ONCE
Status: DISCONTINUED | OUTPATIENT
Start: 2024-06-03 | End: 2024-06-04 | Stop reason: HOSPADM

## 2024-06-03 RX ORDER — CITRIC ACID/SODIUM CITRATE 334-500MG
30 SOLUTION, ORAL ORAL ONCE
Status: DISCONTINUED | OUTPATIENT
Start: 2024-06-03 | End: 2024-06-04 | Stop reason: HOSPADM

## 2024-06-03 RX ORDER — SODIUM CHLORIDE, SODIUM LACTATE, POTASSIUM CHLORIDE, CALCIUM CHLORIDE 600; 310; 30; 20 MG/100ML; MG/100ML; MG/100ML; MG/100ML
125 INJECTION, SOLUTION INTRAVENOUS CONTINUOUS
Status: DISCONTINUED | OUTPATIENT
Start: 2024-06-03 | End: 2024-06-04

## 2024-06-03 RX ORDER — SODIUM CHLORIDE 0.9 % (FLUSH) 0.9 %
10 SYRINGE (ML) INJECTION EVERY 12 HOURS SCHEDULED
Status: DISCONTINUED | OUTPATIENT
Start: 2024-06-03 | End: 2024-06-04

## 2024-06-03 RX ORDER — METOCLOPRAMIDE HYDROCHLORIDE 5 MG/ML
10 INJECTION INTRAMUSCULAR; INTRAVENOUS ONCE AS NEEDED
Status: DISCONTINUED | OUTPATIENT
Start: 2024-06-03 | End: 2024-06-04 | Stop reason: HOSPADM

## 2024-06-03 RX ORDER — SODIUM CHLORIDE 0.9 % (FLUSH) 0.9 %
10 SYRINGE (ML) INJECTION AS NEEDED
Status: DISCONTINUED | OUTPATIENT
Start: 2024-06-03 | End: 2024-06-04

## 2024-06-03 RX ORDER — OXYTOCIN/0.9 % SODIUM CHLORIDE 30/500 ML
999 PLASTIC BAG, INJECTION (ML) INTRAVENOUS ONCE
Status: CANCELLED | OUTPATIENT
Start: 2024-06-03 | End: 2024-06-03

## 2024-06-03 RX ORDER — ROPIVACAINE HYDROCHLORIDE 2 MG/ML
15 INJECTION, SOLUTION EPIDURAL; INFILTRATION; PERINEURAL CONTINUOUS
Status: DISCONTINUED | OUTPATIENT
Start: 2024-06-03 | End: 2024-06-04

## 2024-06-03 RX ORDER — SODIUM CHLORIDE 0.9 % (FLUSH) 0.9 %
10 SYRINGE (ML) INJECTION AS NEEDED
Status: DISCONTINUED | OUTPATIENT
Start: 2024-06-03 | End: 2024-06-04 | Stop reason: HOSPADM

## 2024-06-03 RX ORDER — FENTANYL CITRATE 50 UG/ML
100 INJECTION, SOLUTION INTRAMUSCULAR; INTRAVENOUS
Status: DISCONTINUED | OUTPATIENT
Start: 2024-06-03 | End: 2024-06-04 | Stop reason: HOSPADM

## 2024-06-03 RX ORDER — SODIUM CHLORIDE 9 MG/ML
40 INJECTION, SOLUTION INTRAVENOUS AS NEEDED
Status: DISCONTINUED | OUTPATIENT
Start: 2024-06-03 | End: 2024-06-04 | Stop reason: HOSPADM

## 2024-06-03 RX ORDER — ONDANSETRON 4 MG/1
4 TABLET, ORALLY DISINTEGRATING ORAL EVERY 6 HOURS PRN
Status: DISCONTINUED | OUTPATIENT
Start: 2024-06-03 | End: 2024-06-04 | Stop reason: HOSPADM

## 2024-06-03 RX ORDER — FAMOTIDINE 10 MG/ML
20 INJECTION, SOLUTION INTRAVENOUS ONCE AS NEEDED
Status: DISCONTINUED | OUTPATIENT
Start: 2024-06-03 | End: 2024-06-04 | Stop reason: HOSPADM

## 2024-06-03 RX ADMIN — ROPIVACAINE HYDROCHLORIDE 15 ML/HR: 2 INJECTION, SOLUTION EPIDURAL; INFILTRATION at 13:10

## 2024-06-03 RX ADMIN — LIDOCAINE HYDROCHLORIDE AND EPINEPHRINE 3 ML: 15; 5 INJECTION, SOLUTION EPIDURAL at 13:04

## 2024-06-03 RX ADMIN — FENTANYL CITRATE 100 MCG: 50 INJECTION, SOLUTION INTRAMUSCULAR; INTRAVENOUS at 13:07

## 2024-06-03 RX ADMIN — LIDOCAINE HYDROCHLORIDE AND EPINEPHRINE 10 ML: 20; 5 INJECTION, SOLUTION EPIDURAL; INFILTRATION; INTRACAUDAL; PERINEURAL at 19:19

## 2024-06-03 RX ADMIN — Medication 2 MILLI-UNITS/MIN: at 05:36

## 2024-06-03 RX ADMIN — ACETAMINOPHEN 650 MG: 325 TABLET ORAL at 21:47

## 2024-06-03 RX ADMIN — ROPIVACAINE HYDROCHLORIDE 10 ML: 5 INJECTION, SOLUTION EPIDURAL; INFILTRATION; PERINEURAL at 13:10

## 2024-06-03 RX ADMIN — SODIUM CHLORIDE, POTASSIUM CHLORIDE, SODIUM LACTATE AND CALCIUM CHLORIDE 1000 ML: 600; 310; 30; 20 INJECTION, SOLUTION INTRAVENOUS at 13:00

## 2024-06-03 RX ADMIN — SODIUM CHLORIDE, POTASSIUM CHLORIDE, SODIUM LACTATE AND CALCIUM CHLORIDE 125 ML/HR: 600; 310; 30; 20 INJECTION, SOLUTION INTRAVENOUS at 02:25

## 2024-06-03 RX ADMIN — SODIUM CHLORIDE, POTASSIUM CHLORIDE, SODIUM LACTATE AND CALCIUM CHLORIDE 125 ML/HR: 600; 310; 30; 20 INJECTION, SOLUTION INTRAVENOUS at 13:28

## 2024-06-03 NOTE — ANESTHESIA PREPROCEDURE EVALUATION
Anesthesia Evaluation     Patient summary reviewed and Nursing notes reviewed   NPO Solid Status: > 6 hours  NPO Liquid Status: < 2 hours           Airway   Mallampati: II  TM distance: >3 FB  Neck ROM: full  Dental      Pulmonary - negative pulmonary ROS   Cardiovascular     (+) hypertension      Neuro/Psych  (+) psychiatric history Anxiety and Depression  GI/Hepatic/Renal/Endo    (+) GERD    Musculoskeletal (-) negative ROS    Abdominal    Substance History - negative use     OB/GYN    (+) Pregnant, pregnancy induced hypertension        Other - negative ROS                   Anesthesia Plan    ASA 2     epidural       Anesthetic plan, risks, benefits, and alternatives have been provided, discussed and informed consent has been obtained with: patient.    Plan discussed with attending.    CODE STATUS:    Level Of Support Discussed With: Patient  Code Status (Patient has no pulse and is not breathing): CPR (Attempt to Resuscitate)  Medical Interventions (Patient has pulse or is breathing): Full Support

## 2024-06-03 NOTE — H&P
"Huyen Interiano  2000  [id number]  [id number]    CC: induction for gest HTN  HPI:  Patient is 23 y.o. female   currently at 37w0d presents labor induction for gest HTN.     PMH:  Current meds: PNV, baby ASA  Illnesses: factor V (heterozygote, pt tested because mother has it)  Surgeries: oral surg  Allergies: PCN- hives (childhood)    Past OB History:       OB History    Para Term  AB Living   2 0 0 0 1 0   SAB IAB Ectopic Molar Multiple Live Births   1 0 0 0 0 0      # Outcome Date GA Lbr Cole/2nd Weight Sex Type Anes PTL Lv   2 Current            1 2021              Obstetric Comments   FOB - Jamey    - chemical pregnancy       Fob#1 pregnancy #1   Fob#2 pregnancy #2      No history of STIs   Has not had the hpv vaccine her understanding             SH: tob neg , EtOH neg, drugs neg      General ROS: contractions.   All other systems reviewed and are negative.      Physical Examination: General appearance - alert, well appearing, and in no distress  Vital signs - /86 (BP Location: Left arm, Patient Position: Lying)   Pulse 117   Temp 98.6 °F (37 °C) (Oral)   Resp 16   Ht 165.1 cm (65\")   Wt 77.1 kg (170 lb)   LMP 2023 (Exact Date)   BMI 28.29 kg/m²   HEENT: normocephalic, atraumatic,oropharynx clear, appearance of ears and nose normal  Neck - supple, no significant adenopathy, no thyromegaly  Lymphatics - no palpable lymphadenopathy in the neck or groin, no hepatosplenomegaly  Chest - clear to auscultation, no wheezes, rales or rhonchi, respiratory effort non-labored  Heart - normal rate, regular rhythm, no murmurs, rubs, clicks or gallops, no JVD, trace lower extremity edema  Abdomen - soft, nontender, nondistended, no masses, no hepatosplenomegaly  no rebound tenderness noted, bowel sounds normal  Vaginal Exam: 1-2/50%/-2,external genitalia normal ,24f kingston inserted during exam  Extremities - no pedal edema noted, no calf tend  Skin -warm and dry, normal " coloration and turgor, no rashes, no suspicious skin lesions noted      Labs:  Results from last 7 days   Lab Units 06/03/24  0233   WBC 10*3/mm3 6.95   HEMOGLOBIN g/dL 12.0   HEMATOCRIT % 35.9   PLATELETS 10*3/mm3 153     Results from last 7 days   Lab Units 06/02/24  0127   CREATININE mg/dL 0.51*   BILIRUBIN mg/dL 0.2   ALK PHOS U/L 115   ALT (SGPT) U/L 8   AST (SGOT) U/L 19     Fetal monitoring: indication gest HTN, onset 0140, offset 0314, baseline 130, mod BTB variability, multiple accels (15 X 15), no decels, irreg contractions, interpretation reactive NST    Radiology     Assessment 1)IUP 37 weeks   2)gest HTN   3)unfavorable cervix   4)heterozygote factor V    Plan 1)admit   2)cervical ripening/induction    Reyes Zelaya MD  6/3/2024  03:13 EDT

## 2024-06-03 NOTE — PROGRESS NOTES
SARA Bajwa  Huyen Interiano  : 2000  MRN: [id number]  CSN: [id number]    Labor progress note    Subjective   She is comfortable with the epidural.     Objective   Min/max vitals past 24 hours:  Temp  Min: 98.1 °F (36.7 °C)  Max: 98.6 °F (37 °C)   BP  Min: 91/50  Max: 152/82   Pulse  Min: 100  Max: 148   Resp  Min: 16  Max: 18        FHT's: reassuring and category 1   Cervix: was checked (by me): 10 cm / 100 % / 0   Contractions: regular every 2 minutes        Assessment   IUP at 37w0d  Progressing in labor  Fetal status reassuring  IOL for GHTN     Plan   Trial push    Daria Thompson MD  6/3/2024  18:05 EDT

## 2024-06-03 NOTE — PROGRESS NOTES
SARA Bajwa  Huyen Interiano  : 2000  MRN: [id number]  CSN: [id number]    Labor progress note    Subjective   Her cervical kingston is out.  She is noting fluid leakage and some mild pain .     Objective   Min/max vitals past 24 hours:  Temp  Min: 98.2 °F (36.8 °C)  Max: 98.6 °F (37 °C)   BP  Min: 126/80  Max: 148/86   Pulse  Min: 101  Max: 117   Resp  Min: 16  Max: 16        FHT's: reassuring and category 1   Cervix: was checked (by me): 4-5 cm / 60 % / -2   Contractions: regular every 1-2 minutes        Assessment   IUP at 37w0d  Progressing in labor  Fetal status reassuring  IOL for gestational hypertension      Plan   AROM - clear fluid seen   OK for epidural  Recheck cervix in 2-4 hours  Continue pitocin    Daria Thompson MD  6/3/2024  10:59 EDT

## 2024-06-03 NOTE — PROGRESS NOTES
SARA Bajwa  Huyen Interiano  : 2000  MRN: [id number]  CSN: [id number]    Labor progress note    Subjective   She is comfortable with the epidural.     Objective   Min/max vitals past 24 hours:  Temp  Min: 98.1 °F (36.7 °C)  Max: 98.6 °F (37 °C)   BP  Min: 91/50  Max: 152/82   Pulse  Min: 100  Max: 148   Resp  Min: 16  Max: 18        FHT's: reassuring and category 1   Cervix: was checked (by me): 8 cm / 100 % / -1   Contractions: regular every 2 minutes        Assessment   IUP at 37w0d  Fetal status reassuring  Progressing in labor   IOL for gestational hypertension      Plan   Continue with induction  Recheck cervix in 1-2 hours    Daria Thompson MD  6/3/2024  16:51 EDT

## 2024-06-03 NOTE — PROGRESS NOTES
SARA Bajwa  Huyen Interiano  : 2000  MRN: [id number]  CSN: [id number]    Labor progress note    Subjective   She was having pain so got a redose about 10 minutes ago and stopped pushing around 1715.      Objective   Min/max vitals past 24 hours:  Temp  Min: 98.1 °F (36.7 °C)  Max: 99.1 °F (37.3 °C)   BP  Min: 91/50  Max: 152/82   Pulse  Min: 100  Max: 148   Resp  Min: 16  Max: 18        FHT's: reassuring and category 1   Cervix: was checked (by me): 10 cm / 100 % / 0 to +1   Contractions: regular every 2 minutes        Assessment   IUP at 37w0d  Fetal status reassuring  IOL for GHTN     Plan   Starting pushing again in ~ 15 minutes     Daria Thompson MD  6/3/2024  19:35 EDT

## 2024-06-03 NOTE — ANESTHESIA PROCEDURE NOTES
Labor Epidural      Patient reassessed immediately prior to procedure    Patient location during procedure: floor  Performed By  CRNA/NIKI: Alicja Rios CRNA  Preanesthetic Checklist  Completed: patient identified, IV checked, site marked, risks and benefits discussed, surgical consent, monitors and equipment checked, pre-op evaluation and timeout performed  Prep:  Pt Position:sitting  Sterile Tech:cap, gloves, mask and sterile barrier  Prep:DuraPrep  Monitoring:blood pressure monitoring  Epidural Block Procedure:  Approach:midline  Guidance:landmark technique and palpation technique  Location:L4-L5  Needle Type:Tuohy  Needle Gauge:17 G  Loss of Resistance Medium: air  Loss of Resistance: 5cm  Cath Depth at skin:10 cm  Paresthesia: none  Aspiration:negative  Test Dose:negative  Number of Attempts: 1  Post Assessment:  Dressing:occlusive dressing applied and secured with tape  Pt Tolerance:patient tolerated the procedure well with no apparent complications  Complications:no

## 2024-06-03 NOTE — PROGRESS NOTES
SARA Bajwa  Huyen Interiano  : 2000  MRN: [id number]  CSN: [id number]    Labor progress note    Subjective   She  is having pelvic cramping from cervical kingston .     Objective   Min/max vitals past 24 hours:  Temp  Min: 98.6 °F (37 °C)  Max: 98.6 °F (37 °C)   BP  Min: 143/86  Max: 148/86   Pulse  Min: 104  Max: 117   Resp  Min: 16  Max: 16        FHT's: reassuring and category 1   Cervix: was not checked.   Contractions: irregular      CBC:   Lab Results   Component Value Date    WBC 6.95 2024    HGB 12.0 2024    HCT 35.9 2024     2024     Pre-eclampsia Panel:   Lab Results   Component Value Date    ALKPHOS 113 2024    AST 15 2024    ALT 9 2024    BILITOT 0.2 2024     2024    URICACID 3.7 2024    CREATININE 0.51 (L) 2024       Assessment   IUP at 37w0d  Fetal status reassuring  IOL for gestational hypertension without severe features   GBS unknown, > 37 weeks and no risk factors     Plan   Continue cervical ripening with cervical kingston and low dose pitocin    Daria Thompson MD  6/3/2024  08:11 EDT

## 2024-06-04 LAB
ABO GROUP BLD: NORMAL
BASOPHILS # BLD AUTO: 0.02 10*3/MM3 (ref 0–0.2)
BASOPHILS NFR BLD AUTO: 0.2 % (ref 0–1.5)
DEPRECATED RDW RBC AUTO: 39.2 FL (ref 37–54)
DEPRECATED RDW RBC AUTO: 40.2 FL (ref 37–54)
EOSINOPHIL # BLD AUTO: 0.02 10*3/MM3 (ref 0–0.4)
EOSINOPHIL NFR BLD AUTO: 0.2 % (ref 0.3–6.2)
ERYTHROCYTE [DISTWIDTH] IN BLOOD BY AUTOMATED COUNT: 12.3 % (ref 12.3–15.4)
ERYTHROCYTE [DISTWIDTH] IN BLOOD BY AUTOMATED COUNT: 12.5 % (ref 12.3–15.4)
FETAL BLEED: NEGATIVE
HCT VFR BLD AUTO: 30 % (ref 34–46.6)
HCT VFR BLD AUTO: 32 % (ref 34–46.6)
HGB BLD-MCNC: 10.3 G/DL (ref 12–15.9)
HGB BLD-MCNC: 11.1 G/DL (ref 12–15.9)
IMM GRANULOCYTES # BLD AUTO: 0.08 10*3/MM3 (ref 0–0.05)
IMM GRANULOCYTES NFR BLD AUTO: 0.6 % (ref 0–0.5)
LYMPHOCYTES # BLD AUTO: 1.9 10*3/MM3 (ref 0.7–3.1)
LYMPHOCYTES NFR BLD AUTO: 14.3 % (ref 19.6–45.3)
MCH RBC QN AUTO: 30.3 PG (ref 26.6–33)
MCH RBC QN AUTO: 30.3 PG (ref 26.6–33)
MCHC RBC AUTO-ENTMCNC: 34.3 G/DL (ref 31.5–35.7)
MCHC RBC AUTO-ENTMCNC: 34.7 G/DL (ref 31.5–35.7)
MCV RBC AUTO: 87.4 FL (ref 79–97)
MCV RBC AUTO: 88.2 FL (ref 79–97)
MONOCYTES # BLD AUTO: 1.22 10*3/MM3 (ref 0.1–0.9)
MONOCYTES NFR BLD AUTO: 9.2 % (ref 5–12)
NEUTROPHILS NFR BLD AUTO: 10.08 10*3/MM3 (ref 1.7–7)
NEUTROPHILS NFR BLD AUTO: 75.5 % (ref 42.7–76)
NRBC BLD AUTO-RTO: 0 /100 WBC (ref 0–0.2)
NUMBER OF DOSES: NORMAL
PLATELET # BLD AUTO: 132 10*3/MM3 (ref 140–450)
PLATELET # BLD AUTO: 145 10*3/MM3 (ref 140–450)
PMV BLD AUTO: 10.9 FL (ref 6–12)
PMV BLD AUTO: 10.9 FL (ref 6–12)
RBC # BLD AUTO: 3.4 10*6/MM3 (ref 3.77–5.28)
RBC # BLD AUTO: 3.66 10*6/MM3 (ref 3.77–5.28)
RH BLD: NEGATIVE
WBC NRBC COR # BLD AUTO: 13.32 10*3/MM3 (ref 3.4–10.8)
WBC NRBC COR # BLD AUTO: 16.96 10*3/MM3 (ref 3.4–10.8)

## 2024-06-04 PROCEDURE — 0503F POSTPARTUM CARE VISIT: CPT | Performed by: OBSTETRICS & GYNECOLOGY

## 2024-06-04 PROCEDURE — 86901 BLOOD TYPING SEROLOGIC RH(D): CPT | Performed by: OBSTETRICS & GYNECOLOGY

## 2024-06-04 PROCEDURE — 86900 BLOOD TYPING SEROLOGIC ABO: CPT | Performed by: OBSTETRICS & GYNECOLOGY

## 2024-06-04 PROCEDURE — 85025 COMPLETE CBC W/AUTO DIFF WBC: CPT | Performed by: OBSTETRICS & GYNECOLOGY

## 2024-06-04 PROCEDURE — 3E0334Z INTRODUCTION OF SERUM, TOXOID AND VACCINE INTO PERIPHERAL VEIN, PERCUTANEOUS APPROACH: ICD-10-PCS | Performed by: OBSTETRICS & GYNECOLOGY

## 2024-06-04 PROCEDURE — 85461 HEMOGLOBIN FETAL: CPT | Performed by: OBSTETRICS & GYNECOLOGY

## 2024-06-04 PROCEDURE — 25010000002 RHO D IMMUNE GLOBULIN 1500 UNIT/2ML SOLUTION PREFILLED SYRINGE: Performed by: OBSTETRICS & GYNECOLOGY

## 2024-06-04 RX ORDER — OXYTOCIN/0.9 % SODIUM CHLORIDE 30/500 ML
125 PLASTIC BAG, INJECTION (ML) INTRAVENOUS ONCE AS NEEDED
Status: DISCONTINUED | OUTPATIENT
Start: 2024-06-04 | End: 2024-06-05 | Stop reason: HOSPADM

## 2024-06-04 RX ORDER — ACETAMINOPHEN 325 MG/1
650 TABLET ORAL EVERY 6 HOURS PRN
Status: DISCONTINUED | OUTPATIENT
Start: 2024-06-04 | End: 2024-06-05 | Stop reason: HOSPADM

## 2024-06-04 RX ORDER — IBUPROFEN 600 MG/1
600 TABLET ORAL EVERY 6 HOURS PRN
Status: DISCONTINUED | OUTPATIENT
Start: 2024-06-04 | End: 2024-06-05 | Stop reason: HOSPADM

## 2024-06-04 RX ORDER — ONDANSETRON 4 MG/1
4 TABLET, ORALLY DISINTEGRATING ORAL EVERY 8 HOURS PRN
Status: DISCONTINUED | OUTPATIENT
Start: 2024-06-04 | End: 2024-06-05 | Stop reason: HOSPADM

## 2024-06-04 RX ORDER — PRENATAL VIT/IRON FUM/FOLIC AC 27MG-0.8MG
1 TABLET ORAL DAILY
Status: DISCONTINUED | OUTPATIENT
Start: 2024-06-04 | End: 2024-06-05 | Stop reason: HOSPADM

## 2024-06-04 RX ORDER — DOCUSATE SODIUM 100 MG/1
100 CAPSULE, LIQUID FILLED ORAL 2 TIMES DAILY
Status: DISCONTINUED | OUTPATIENT
Start: 2024-06-04 | End: 2024-06-05 | Stop reason: HOSPADM

## 2024-06-04 RX ORDER — SODIUM CHLORIDE 0.9 % (FLUSH) 0.9 %
1-10 SYRINGE (ML) INJECTION AS NEEDED
Status: DISCONTINUED | OUTPATIENT
Start: 2024-06-04 | End: 2024-06-05 | Stop reason: HOSPADM

## 2024-06-04 RX ORDER — BISACODYL 10 MG
10 SUPPOSITORY, RECTAL RECTAL DAILY PRN
Status: DISCONTINUED | OUTPATIENT
Start: 2024-06-04 | End: 2024-06-05 | Stop reason: HOSPADM

## 2024-06-04 RX ORDER — HYDROCORTISONE 25 MG/G
1 CREAM TOPICAL AS NEEDED
Status: DISCONTINUED | OUTPATIENT
Start: 2024-06-04 | End: 2024-06-05 | Stop reason: HOSPADM

## 2024-06-04 RX ORDER — PROMETHAZINE HYDROCHLORIDE 12.5 MG/1
12.5 TABLET ORAL EVERY 4 HOURS PRN
Status: DISCONTINUED | OUTPATIENT
Start: 2024-06-04 | End: 2024-06-05 | Stop reason: HOSPADM

## 2024-06-04 RX ADMIN — DOCUSATE SODIUM 100 MG: 100 CAPSULE, LIQUID FILLED ORAL at 21:28

## 2024-06-04 RX ADMIN — PRENATAL VITAMINS-IRON FUMARATE 27 MG IRON-FOLIC ACID 0.8 MG TABLET 1 TABLET: at 09:50

## 2024-06-04 RX ADMIN — DOCUSATE SODIUM 100 MG: 100 CAPSULE, LIQUID FILLED ORAL at 09:50

## 2024-06-04 RX ADMIN — HUMAN RHO(D) IMMUNE GLOBULIN 1500 UNITS: 1500 SOLUTION INTRAMUSCULAR; INTRAVENOUS at 11:39

## 2024-06-04 NOTE — ANESTHESIA POSTPROCEDURE EVALUATION
Patient: Huyen Interiano    Procedure Summary       Date: 06/03/24 Room / Location:     Anesthesia Start: 1257 Anesthesia Stop: 2055    Procedure: LABOR ANALGESIA Diagnosis:     Scheduled Providers:  Provider: Cha Sheehan DO    Anesthesia Type: epidural ASA Status: 2            Anesthesia Type: epidural    Vitals  Vitals Value Taken Time   /55 06/04/24 0820   Temp 98 °F (36.7 °C) 06/04/24 0820   Pulse 111 06/04/24 0820   Resp 20 06/04/24 0820   SpO2 98 % 06/03/24 2337   Vitals shown include unfiled device data.        Post Anesthesia Care and Evaluation    Patient location during evaluation: bedside  Patient participation: complete - patient participated  Level of consciousness: awake and alert  Pain management: adequate    Airway patency: patent  Anesthetic complications: No anesthetic complications    Cardiovascular status: acceptable  Respiratory status: acceptable  Hydration status: acceptable  Post Neuraxial Block status: Motor and sensory function returned to baseline and No signs or symptoms of PDPH

## 2024-06-04 NOTE — PLAN OF CARE
Goal Outcome Evaluation:  Plan of Care Reviewed With: patient        Progress: no change  Outcome Evaluation: u/1 small lochia, Breastfeeding and pumping

## 2024-06-04 NOTE — L&D DELIVERY NOTE
" [facility]   Vaginal Delivery Note    Patient Name: Huyen Interiano  : 2000  MRN: [id number]    Date of Delivery: 6/3/2024     Diagnosis     Pre & Post-Delivery:  Intrauterine pregnancy at 37w0d  Labor status: Induced Onset of Labor     Postpartum care following vaginal delivery 6/3/24 ([place])    Gestational hypertension without significant proteinuria in third trimester             Problem List    Transfer to Postpartum     Review the Delivery Report for details.     Delivery     Delivery: Vaginal, Spontaneous     YOB: 2022    Time of Birth:  Gestational Age 8:52 PM   37w0d     Anesthesia: Epidural     Delivering clinician:     Forceps?   No   Vacuum? No    Shoulder dystocia present: No        Delivery narrative:  Viable male infant delivered OA over intact perineum. Anterior shoulder delivered easily followed by posterior shoulder and remainder of infant body. Infant placed on maternal abdomen, bulb suctioned and dried. Umbilical cord clamped x2 and cut after 60 second delay. Placenta then delivered spontaneously with gentle traction, intact with 3VC. First degree laceration repaired with 3-0 vicryl suture. Right labial laceration repaired with interrupted 3-0 vicryl sutures. Left labial laceration superficial and hemostatic. Fundus then firm. Counts correct. EBL 400ml.         Infant     Findings: male  infant     Infant observations: Weight: 3080 g (6 lb 12.6 oz)   Length: 19.25  in  Observations/Comments:        Apgars:   @ 1 minute /      @ 5 minutes   Infant Name: Johnny     Placenta & Cord         Placenta delivered  Spontaneous  at   6/3/2024  8:55 PM     Cord: 3 vessels  present.   Nuchal Cord?  no   Cord blood obtained: Yes    Cord gases obtained:  No    Cord gas results: Venous:  No results found for: \"PHCVEN\", \"BECVEN\"    Arterial:  No results found for: \"PHCART\", \"BECART\"     Repair     Episiotomy: None     No    Lacerations: No   Estimated Blood Loss: Est. Blood Loss " (mL): 400 mL (Filed from Delivery Summary) (06/03/24 2052)     Quantitative Blood Loss:  400 ml        Complications     none    Disposition     Mother to Mother Baby/Postpartum  in stable condition currently.  Baby to remains with mom  in stable condition currently.    Daria Thompson MD  06/03/24  21:10 EDT

## 2024-06-04 NOTE — LACTATION NOTE
24 0230   Maternal Information   Date of Referral 24   Person Making Referral lactation consultant  (newly postpartum, courtesy visit)   Maternal Reason for Referral breastfeeding currently;no prior breastfeeding experience   Infant Reason for Referral  infant;35-37 weeks gestation   Maternal Assessment   Breast Size Issue none   Breast Shape Bilateral:;round   Breast Density Bilateral:;soft   Areola Bilateral:;elastic   Nipples Bilateral:;flat;other (see comments)  (everted well with stimulation)   Left Nipple Symptoms intact;nontender   Right Nipple Symptoms intact;nontender   Maternal Infant Feeding   Maternal Emotional State independent   Infant Positioning clutch/football  (attempt, baby sleepy)   Support Person Involvement verbally supports mother   Milk Expression/Equipment   Breast Pump Type double electric, personal  (took S2)   Breast Pumping   Breast Pumping Interventions early pumping promoted;frequent pumping encouraged;post-feed pumping encouraged  (pump Q3H after feeds to build optimal milk supply)     Reviewed breastfeeding tips and information sheets with parents. They verbalized understanding. Attempted to latch in left football, baby sleepy, unable to wake with stimulation. Took Mom S2. Encouraged to pump Q3H with baby being 37w 0d. Mom verbalized understanding. Encouraged to call for latch assist. Encouraged to call lactation with any questions/concerns. Encouraged to call outpatient clinic prn after discharge.

## 2024-06-04 NOTE — PROGRESS NOTES
6/4/2024  PPD #1    Subjective   Huyen feels well.  Patient describes her lochia less than menses.  Pain is well controlled       Objective   Temp: Temp:  [98 °F (36.7 °C)-99.1 °F (37.3 °C)] 98 °F (36.7 °C) Temp src: Oral   BP: BP: ()/(50-87) 117/55        Pulse: Heart Rate:  [100-148] 111  RR: Resp:  [16-20] 20    General:  No acute distress   Abdomen: Fundus firm and beneath umbilicus   Pelvis: deferred     Lab Results   Component Value Date    WBC 13.32 (H) 06/04/2024    HGB 10.3 (L) 06/04/2024    HCT 30.0 (L) 06/04/2024    MCV 88.2 06/04/2024     (L) 06/04/2024    HEPBSAG Non-Reactive 04/04/2024       Assessment  PPD# 1 after vaginal delivery  Gestational HTN. Normotensive since delivery.   Tachycardia. Uncertain etiology. Asymptomatic.     Plan  Routine postpartum care.  Will require further investigation should tachycardia persist       This note has been electronically signed.    Trenton Mars MD  June 4, 2024

## 2024-06-04 NOTE — CASE MANAGEMENT/SOCIAL WORK
"Continued Stay Note  [facility]     Patient Name: Huyen Interiano  MRN: [id number]  Today's Date: 6/4/2024    Admit Date: 6/3/2024    Plan: KRISHNA consult--Home   Discharge Plan       Row Name 06/04/24 1333       Plan    Plan SW consult--Home    Plan Comments MSW met with pt. at bedside. Pt. had an Hearne score of 13. Pt. reports that she is feeling \"okay\".  MSW provided resources and offered support. Pt. reports that she is aware of how she feels and will ask for help if she needs it.  No other needs or concerns at this time. MSW is available.    Final Discharge Disposition Code 01 - home or self-care                   Discharge Codes    No documentation.                       GIOVANNA Boyle    "

## 2024-06-04 NOTE — LACTATION NOTE
"   06/04/24 1100   Maternal Information   Date of Referral 06/04/24   Person Making Referral lactation consultant  (courtesy follow-up)   Maternal Reason for Referral no prior breastfeeding experience   Infant Reason for Referral 35-37 weeks gestation  (37 weeks)   Maternal Assessment   Breast Size Issue none   Breast Shape Bilateral:;round   Breast Density Bilateral:;soft   Areola Bilateral:;elastic   Nipples Bilateral:;flat  (had mother stimulate nipples; hue with stimulation, but then flat when latching infant; provided medium nipple shield and soft shells to assist with eversion)   Left Nipple Symptoms intact;nontender   Right Nipple Symptoms intact;nontender   Maternal Infant Feeding   Maternal Emotional State relaxed;receptive   Infant Positioning clutch/football;cross-cradle  (right; infant too sleepy)   Latch Assistance verbal guidance offered;full assistance needed   Support Person Involvement verbally supports mother   Milk Expression/Equipment   Breast Pump Type double electric, personal  (RX Spectra)   Breast Pump Flange Type hard   Breast Pump Flange Size 24 mm   Equipment for Home Use other (see comments)  (utilized Spectra pump; sterilized pump parts for mother; expressed 4ml and showed parents how to syringe feed infant)   Breast Pumping   Breast Pumping Interventions early pumping promoted;post-feed pumping encouraged  (for short/missed feedings, while supplementing with formula, due to infant getational age, or too painful to latch infant, to best encourage milk production)     Courtesy visit with postpartum couplet; assisted mother with R football hold and L cross cradle hold, infant too sleepy to nurse; due to mother's nipples hue with stimulation, but invert when \"sandwich\" breast, so placed medium nipple shield with 0.2ml of formula to entice infant; infant too sleepy to nurse; sterilized pump parts for mother and began mother pumping; expressed 4ml; showed parents how to syringe feed " infant; lots of skin to skin; to nurse infant every three hours and with any cues seen, can supplement, but to also pump after feedings; to call lactation PRN.

## 2024-06-04 NOTE — LACTATION NOTE
06/04/24 0950   Maternal Information   Date of Referral 06/04/24   Person Making Referral lactation consultant  (courtesy visit)   Maternal Reason for Referral   (infant had already been fed formula earlier; due to nurse at 11 am; LC will visit then)

## 2024-06-05 ENCOUNTER — PATIENT OUTREACH (OUTPATIENT)
Dept: LABOR AND DELIVERY | Facility: HOSPITAL | Age: 24
End: 2024-06-05
Payer: MEDICAID

## 2024-06-05 VITALS
HEIGHT: 65 IN | HEART RATE: 85 BPM | RESPIRATION RATE: 16 BRPM | OXYGEN SATURATION: 97 % | SYSTOLIC BLOOD PRESSURE: 125 MMHG | BODY MASS INDEX: 28.32 KG/M2 | TEMPERATURE: 98.4 F | DIASTOLIC BLOOD PRESSURE: 67 MMHG | WEIGHT: 170 LBS

## 2024-06-05 LAB
DEPRECATED RDW RBC AUTO: 41.6 FL (ref 37–54)
ERYTHROCYTE [DISTWIDTH] IN BLOOD BY AUTOMATED COUNT: 12.7 % (ref 12.3–15.4)
HCT VFR BLD AUTO: 32.3 % (ref 34–46.6)
HGB BLD-MCNC: 10.7 G/DL (ref 12–15.9)
MCH RBC QN AUTO: 29.6 PG (ref 26.6–33)
MCHC RBC AUTO-ENTMCNC: 33.1 G/DL (ref 31.5–35.7)
MCV RBC AUTO: 89.2 FL (ref 79–97)
PLATELET # BLD AUTO: 139 10*3/MM3 (ref 140–450)
PMV BLD AUTO: 11 FL (ref 6–12)
RBC # BLD AUTO: 3.62 10*6/MM3 (ref 3.77–5.28)
WBC NRBC COR # BLD AUTO: 12.55 10*3/MM3 (ref 3.4–10.8)

## 2024-06-05 PROCEDURE — 25010000002 ENOXAPARIN PER 10 MG: Performed by: OBSTETRICS & GYNECOLOGY

## 2024-06-05 PROCEDURE — 0503F POSTPARTUM CARE VISIT: CPT | Performed by: OBSTETRICS & GYNECOLOGY

## 2024-06-05 PROCEDURE — 85027 COMPLETE CBC AUTOMATED: CPT | Performed by: OBSTETRICS & GYNECOLOGY

## 2024-06-05 RX ORDER — IBUPROFEN 600 MG/1
600 TABLET ORAL EVERY 6 HOURS PRN
Qty: 60 TABLET | Refills: 1 | Status: SHIPPED | OUTPATIENT
Start: 2024-06-05

## 2024-06-05 RX ORDER — PSEUDOEPHEDRINE HCL 30 MG
100 TABLET ORAL 2 TIMES DAILY PRN
Qty: 60 CAPSULE | Refills: 1 | Status: SHIPPED | OUTPATIENT
Start: 2024-06-05

## 2024-06-05 RX ORDER — FERROUS SULFATE 325(65) MG
325 TABLET ORAL EVERY OTHER DAY
Qty: 45 TABLET | Refills: 0 | Status: SHIPPED | OUTPATIENT
Start: 2024-06-05 | End: 2024-09-03

## 2024-06-05 RX ORDER — ENOXAPARIN SODIUM 100 MG/ML
40 INJECTION SUBCUTANEOUS ONCE
Status: COMPLETED | OUTPATIENT
Start: 2024-06-05 | End: 2024-06-05

## 2024-06-05 RX ORDER — ENOXAPARIN SODIUM 100 MG/ML
40 INJECTION SUBCUTANEOUS DAILY
Qty: 16.8 ML | Refills: 0 | Status: SHIPPED | OUTPATIENT
Start: 2024-06-05 | End: 2024-07-17

## 2024-06-05 RX ADMIN — DOCUSATE SODIUM 100 MG: 100 CAPSULE, LIQUID FILLED ORAL at 07:27

## 2024-06-05 RX ADMIN — PRENATAL VITAMINS-IRON FUMARATE 27 MG IRON-FOLIC ACID 0.8 MG TABLET 1 TABLET: at 07:27

## 2024-06-05 RX ADMIN — ENOXAPARIN SODIUM 40 MG: 100 INJECTION SUBCUTANEOUS at 12:06

## 2024-06-05 NOTE — OUTREACH NOTE
[facility]  IP Postpartum    Questions/Answers      Flowsheet Row Responses   Best Method for Contacting Cell   Support Person Present Yes   Does the patient have a car seat at the hospital Yes  [It is in the car]   Delivery Note Reviewed Reviewed   Were birth expectations met? Yes   Is there a need for additional support/resources? No   Lactation Note Reviewed Reviewed   Is additional support needed? Yes   Yes, additional support needed comment She is having issues with inverted nipples and is using a shield. Encouraged to contact outpatient lactation clinic as needed.   Any questions or concerns? No   Is the patient going to use Meds to Beds? No   Any concerns related discharge meds/ability to  prescriptions? No   OB Discharge Navigator Reviewed  Reviewed   Confirm Postpartum OB appointment Yes  [BP check next week]   Postpartum OB appointment date 06/10/24   Confirm initial well-child Pediatrician appointment date/time: Yes  [[facility]]   Initial Well Child Pediatrician Appointment Date 24   Additional post-discharge F/U appointments No   Does patient have transportation to appointments? Yes   Any other assistance needed to ensure she is able to attend appointments? No   Does patient have supplies needed at home for  care? Breast Pump, Clothing, Crib, Diapers            Feeling well after delivery. Provided with outpatient lactation clinic contact information for breastfeeding or pumping assistance after discharge. Provided with POST Birth warning signs flyer from [employer]. Encouraged to call with questions, concerns, or for support. Contact information provided. Will f/u 24.    BATOOL Ash RN  Maternity Nurse Navigator    2024, 13:32 EDT

## 2024-06-05 NOTE — PROGRESS NOTES
SARA Bajwa  Huyen Interiano  : 2000  MRN: [id number]  CSN: [id number]    Postpartum Day #2  CC: routine postpartum care   Subjective   Her pain is well controlled.  Vaginal bleeding is less than a normal period.   She reports her mother has had a DVT in the postpartum period before.      Objective     Min/max vitals past 24 hours:   Temp  Min: 97.7 °F (36.5 °C)  Max: 98 °F (36.7 °C)  BP  Min: 115/69  Max: 120/70  Pulse  Min: 87  Max: 111  Resp  Min: 16  Max: 20        General: well developed; well nourished  no acute distress   Abdomen: fundus firm and non-tender   Pelvic: Not performed   Ext: Calves NT     Results from last 7 days   Lab Units 24  0826 24  2359 24  0233   WBC 10*3/mm3 13.32* 16.96* 6.95   HEMOGLOBIN g/dL 10.3* 11.1* 12.0   HEMATOCRIT % 30.0* 32.0* 35.9   PLATELETS 10*3/mm3 132* 145 153     Lab Results   Component Value Date    RH Negative 2024    HEPBSAG Non-Reactive 2024        Assessment   Postpartum Day #2 S/P vaginal delivery  Postpartum anemia  Factor V carrier (patient's mother with history of DVT)  Tachycardia - resolved, normal EKG, mild anemia      Plan   Discharge to home  Advised no tampons or intercourse for 6 weeks.  D/C questions all answered  Follow-up appointment in 1 week for BP check  Recommended lovenox ppx for ~ 6 weeks  Oral Fe      Daria Thompson MD  2024  08:14 EDT

## 2024-06-05 NOTE — DISCHARGE SUMMARY
Discharge Summary     Aydee Interiano  : 2000  MRN: [id number]  CSN: [id number]    Date of Admission: 6/3/2024   Date of Discharge:  2024   Delivering Physician: Daria Thompson        Admission Diagnosis: Gestational hypertension without significant proteinuria in third trimester [O13.3]  Factor V Leiden carrier    Discharge Diagnosis: Same as above plus  Pregnancy at 37w0d - delivered  Postpartum anemia       Procedures: 6/3/2024  - Vaginal, Spontaneous       Hospital Course  Patient is a 23 y.o.  who at 37w0d had a vaginal birth.  Her postpartum course was without complications.  On PPD #2 she was ready for discharge.  She had normal lochia and pain well controlled with oral medications.    She was recommended to take lovenox ppx postpartum given her factor V carrier status and her mother with history of DVT in postpartum period.     Infant  male  fetus weighing 3080 g (6 lb 12.6 oz)   Apgars -  9 @ 1 minute /  9 @ 5 minutes.    Discharge labs  Lab Results   Component Value Date    WBC 13.32 (H) 2024    HGB 10.3 (L) 2024    HCT 30.0 (L) 2024     (L) 2024       Discharge Medications     Discharge Medications        New Medications        Instructions Start Date   benzocaine-menthol 20-0.5 % aerosol topical spray  Commonly known as: DERMOPLAST   Topical, 3 Times Daily PRN      docusate sodium 100 MG capsule   100 mg, Oral, 2 Times Daily PRN      Enoxaparin Sodium 40 MG/0.4ML solution prefilled syringe syringe  Commonly known as: LOVENOX   40 mg, Subcutaneous, Daily      ferrous sulfate 325 (65 FE) MG tablet   325 mg, Oral, Every Other Day      ibuprofen 600 MG tablet  Commonly known as: ADVIL,MOTRIN   600 mg, Oral, Every 6 Hours PRN      witch hazel-glycerin pad  Commonly known as: TUCKS   1 Pad, Topical, 3 Times Daily PRN             Continue These Medications        Instructions Start Date   aspirin 81 MG EC tablet   81 mg, Oral, Daily       famotidine 20 MG tablet  Commonly known as: Pepcid   20 mg, Oral, 2 Times Daily      omeprazole 20 MG capsule  Commonly known as: priLOSEC   20 mg, Oral, Daily      prenatal vitamin 27-0.8 27-0.8 MG tablet tablet   1 tablet, Oral, Daily               Discharge Disposition Home or Self Care   Condition on Discharge: good   Follow-up: 1 week with Dr. Thompson for BP check     Daria Thompson MD  6/5/2024

## 2024-06-06 ENCOUNTER — TELEPHONE (OUTPATIENT)
Dept: OBSTETRICS AND GYNECOLOGY | Facility: CLINIC | Age: 24
End: 2024-06-06
Payer: MEDICAID

## 2024-06-06 ENCOUNTER — TELEPHONE (OUTPATIENT)
Dept: FAMILY MEDICINE CLINIC | Facility: CLINIC | Age: 24
End: 2024-06-06

## 2024-06-06 LAB
QT INTERVAL: 272 MS
QTC INTERVAL: 419 MS

## 2024-06-06 NOTE — TELEPHONE ENCOUNTER
Called and spoke with patient. She states that when she eats food (not happening while drinking fluids) she feels that she is having a numbing sensation in mouth and tongue. It lasts for maybe several minutes and then goes away. No difficulty breathing, no chest pain, no itching, no swelling. I covered ED precautions with patient if any signs of anaphylaxis were to occur, she verified understanding.

## 2024-06-06 NOTE — TELEPHONE ENCOUNTER
Caller: Huyen Interiano    Relationship to patient: Self    Best call back number: [phone]    Patient is needing: EST PT OF THE OFFICE. DELIVERED 6/3/2024 DISCHARGED YESTERDAY 6/5/2024. SINCE LEAVING HOSPITAL EVERY TIME AFTER SHE EATS. SHE HAS A NUMBING FEELING IN HER THROAT. ASKING FOR CALL BACK

## 2024-06-06 NOTE — TELEPHONE ENCOUNTER
Would not think this is delivery related however have her keep an eye on symptoms and with any shortness of breath or concerns go to the ER would also reach out to her PCP as well.  If she needs to be seen in office prior to her visit with Dr. Thompson and have her call to schedule this.  CHAPIN Jovel     Called and spoke with patient, informed them of physician advisement in full.  Patient verified understanding and had no further questions at this time.  We discussed ED precautions on earlier phone call but covered them again, and she will reach out to PCP for advisement.

## 2024-06-06 NOTE — TELEPHONE ENCOUNTER
Caller: Huyen Interiano    Relationship to patient: Self    Best call back number: [phone]     Chief complaint: NA    Patient directed to call 911 or go to their nearest emergency room.     Patient verbalized understanding: [x] Yes  [] No  If no, why?    Additional notes:PATIET STATES HER THROAT GOES NUMB WHEN SHE EATS. PATIENT JUST DELIVERED A BABY A FEW DAYS AGO. HUB DIRECTED HER TO GO TO THE ER TO GET CHECKED OUT. PATIENT STATES SHE WOULD GO.

## 2024-06-10 ENCOUNTER — POSTPARTUM VISIT (OUTPATIENT)
Dept: OBSTETRICS AND GYNECOLOGY | Facility: CLINIC | Age: 24
End: 2024-06-10
Payer: MEDICAID

## 2024-06-10 VITALS
BODY MASS INDEX: 25.49 KG/M2 | HEIGHT: 65 IN | SYSTOLIC BLOOD PRESSURE: 108 MMHG | DIASTOLIC BLOOD PRESSURE: 78 MMHG | WEIGHT: 153 LBS

## 2024-06-10 DIAGNOSIS — D68.51 FACTOR V LEIDEN MUTATION: ICD-10-CM

## 2024-06-10 PROCEDURE — 0503F POSTPARTUM CARE VISIT: CPT | Performed by: OBSTETRICS & GYNECOLOGY

## 2024-06-10 RX ORDER — BLOOD-GLUCOSE METER
EACH MISCELLANEOUS
COMMUNITY
Start: 2024-04-30 | End: 2024-06-10

## 2024-06-10 NOTE — PROGRESS NOTES
"Subjective   Chief Complaint   Patient presents with    Postpartum Care     1w0d PP vaginal delivery / BP check     Huyen Interiano is a 24 y.o. year old  presenting to be seen for her postpartum visit BP check.  She had a vaginal delivery.  Her son is doing well.    Since delivery she has not been sexually active.  She does not have concerns about post-partum blues/depression.   She reports a history of anxiety in the past and has previously been on possible zoloft and citalopram. However, she reports she is adjusting well and declines treatment with medication. She denies any SI or HI.   No HA, VS, SOA.  [place] Score = 21  She is both breast and bottle feeding due to decreased milk supply.  For ongoing contraception, her plans are undecided.    The following portions of the patient's history were reviewed and updated as appropriate:current medications and allergies    Social History    Tobacco Use      Smoking status: Never        Passive exposure: Never      Smokeless tobacco: Never      Review of Systems  Constitutional POS: nothing reported    NEG: anorexia or night sweats   Genitourinary POS: nothing reported    NEG: dysuria or hematuria      Gastointestinal POS: nothing reported    NEG: bloating, change in bowel habits, melena, or reflux symptoms   Breast POS: nothing reported    NEG: persistent breast lump, skin dimpling, or nipple discharge        Objective   /78 (BP Location: Left arm, Patient Position: Sitting, Cuff Size: Adult)   Ht 165.1 cm (65\")   Wt 69.4 kg (153 lb)   LMP 2023 (Exact Date)   Breastfeeding Yes   BMI 25.46 kg/m²     General: well developed; well nourished  no acute distress   Skin: Not performed.   Thyroid: not examined   Heart:  Not performed.   Lungs: breathing is unlabored   Breasts:  Not performed.   Abdomen: Not performed.   Pelvis: Not performed.        Assessment   Normal 1 week postpartum exam  History of GHTN - normotensive today   PPD survey " score 21 - denies SI or HI, declines treatment   History of factor V Leiden carrier      Plan   BC options reviewed and compared today: Depo-Provera, IUD - Mirena, IUD - Paraguard, Nexplanon, NuvaRing, OCP (estrogen/progesterone), and OCP (progestin only). She is undecided at this time. Reviewed pelvic rest for ~ 6 weeks.   Reviewed SI and HI precautions and to let us know if symptoms worsen or she desires treatment.   Continue lovenox for total 6 weeks. Okay to continue ASA as well.   The importance of keeping all planned follow-up and taking all medications as prescribed was emphasized.  Follow up for postpartum visit (6 weeks).    No orders of the defined types were placed in this encounter.         This note was electronically signed.    Daria Thompson MD  Mercedes 10, 2024

## 2024-06-13 ENCOUNTER — PATIENT OUTREACH (OUTPATIENT)
Dept: LABOR AND DELIVERY | Facility: HOSPITAL | Age: 24
End: 2024-06-13
Payer: MEDICAID

## 2024-06-13 NOTE — OUTREACH NOTE
Motherhood Connection  Postpartum Check-In    Questions/Answers      Flowsheet Row Responses   Visit Setting Telephone   Best Method for Contacting Cell   OB Discharge Note Reviewed  Reviewed   OB Discharge Navigator Reviewed  Reviewed   OB Discharge Medications Reviewed  Reviewed    discharged home with mother? Yes   Current Pain Levels 0-10 3   Pain Location Perineum   Pain Description Sore   Verbalized Emotional State Acceptance   Family/Support Network Significant Other   Level of Involvement in Care Supportive, Interactive   Do you feel comfortable in your relationship with your baby? Yes   Have members of your household adjusted to your baby? Yes   Is the baby's father supportive and/or involved with the baby? Yes   How does your partner feel about the baby? Involved, Happy   Do you feel safe at home, school and work? Yes   Are you in a relationship with someone who threatens you or hurts you? No   Do you have the resources to keep yourself and your baby healthy and safe? Yes   Lochia (per patient report) Brown-milagros Red   Amount Scant   Number of pads per day --  [unable to quantify but denies saturating pads or clots]   Lochia Odor None   Is patient breastfeeding? No   How is breast suppression going? OK   Postpartum Depression Screening Education Education Provided   Doctor Appointments: Education Provided   Family Planning Education Education Provided   Postpartum Care Education Education Provided   S & S to report Education Provided   Followup Appointments Made Yes   Well Child Visit Appointments Made Yes   Appointment Date 06/10/24   Provider/Agency [facility]   Well Child Checkup Provider Name [facility]   Well Child Check Up Date: 24   Did you complete the visit? Yes   Were there any specific concerns? Yes   Concerns: Restesting for [place] Screen, weight loss   Has additional follow-up with pediatrician or specialist been recommended? Yes   Follow-Up Recommended: awaiting bloodwork  results, weight checks show baby is gaining weight again.   Umbilical Cord No reported signs or symptoms   Was the baby circumcised? Yes   Circumcision care and signs/symptoms to report Reviewed   Infant Feeding Method Formula   Formula Type Other   Other Formula Similac Soy   Formula PO (mL) 60   Formula/Expressed Milk frequency of feedings: Q2H   Number of wet diapers x 24 hours 10   Last BM x 24 hours 4   Emesis (Unmeasured Occurence) some   What safe sleep surface is available? Bassinet   Are there stuffed animals, toys, pillows, quilts, blankets, wedges, positioners, bumpers or other loose bedding in the infant's sleeping environment? No   Where does the baby usually sleep? Bassinet   Does the baby ever share a sleep surface with a sibling, adult or pet? No   Does the baby ever share a sleep surface in a bed, couch, recliner or other? No   What position do you place your baby to sleep for naps? Back   What position do you place your baby to sleep at night Back   Are you and/or other caregivers smoking inside or outside the baby's home? No   Is the infant dressed appropiately for the temperature of the home? Yes   Do you use a clean, dry pacifier that is not attached to a string or stuffed animal? Yes            Review of Systems   Constitutional: Negative.    HENT: Negative.     Eyes: Negative.    Respiratory: Negative.     Cardiovascular: Negative.    Gastrointestinal: Negative.    Endocrine: Negative.    Genitourinary:  Positive for vaginal bleeding.   Musculoskeletal: Negative.    Skin: Negative.    Allergic/Immunologic: Negative.    Neurological: Negative.    Hematological: Negative.    Psychiatric/Behavioral:  Positive for dysphoric mood and sleep disturbance. The patient is nervous/anxious.        Most Recent [place]  Depression Scale Score (EPDS)    Performed by a clinician: 21 (2024 12:39 PM)        5 Ps Screen  Complete    Referral submitted to the following resources (verbal consent  received to submit demographic information):     HANDS    Feeling well since going home. Minimal pain and lochia WNL. Voice sounds flat over the phone. EPDS 21, no SI/HI. States she feels about the same as when she saw Dr. Thompson the other day for her BP check. States she was feeling worse and it is improving now.  Reports good family support. States partner is helping with some night time feedings so she can get a little more uninterrupted sleep. No longer breastfeeding. Her mother lives nearby and can help her when she needs it. Earlier in the pregnancy she spoke about going to start seeing a counselor. She reports that she did not start counseling after all. Offer of referral request for counseling declined by patient. Dr. Thompson, according to her note, offered a referral for counseling on 6/10 which patient declined.    Baby doing well and is gaining weight at follow up appointment. Awaiting lab results as [place] Screen was retested.    No community resource needs at present. She is working on getting enrolled in WIC. Updated resources provided via [employer] message. RN from call center to f/u next week. Contact information provided. Encouraged to call with questions, concerns, or for support before then.    BATOOL Ash RN  Maternity Nurse Navigator    2024, 13:27 EDT

## 2024-06-18 ENCOUNTER — OFFICE VISIT (OUTPATIENT)
Dept: FAMILY MEDICINE CLINIC | Facility: CLINIC | Age: 24
End: 2024-06-18
Payer: MEDICAID

## 2024-06-18 VITALS
SYSTOLIC BLOOD PRESSURE: 108 MMHG | DIASTOLIC BLOOD PRESSURE: 76 MMHG | TEMPERATURE: 98.4 F | RESPIRATION RATE: 18 BRPM | BODY MASS INDEX: 25.16 KG/M2 | HEART RATE: 110 BPM | HEIGHT: 65 IN | WEIGHT: 151 LBS | OXYGEN SATURATION: 97 %

## 2024-06-18 DIAGNOSIS — R42 DIZZINESS: Primary | ICD-10-CM

## 2024-06-18 DIAGNOSIS — H57.89 EYE DISCHARGE: ICD-10-CM

## 2024-06-18 PROCEDURE — 99213 OFFICE O/P EST LOW 20 MIN: CPT | Performed by: STUDENT IN AN ORGANIZED HEALTH CARE EDUCATION/TRAINING PROGRAM

## 2024-06-18 PROCEDURE — 1126F AMNT PAIN NOTED NONE PRSNT: CPT | Performed by: STUDENT IN AN ORGANIZED HEALTH CARE EDUCATION/TRAINING PROGRAM

## 2024-06-18 NOTE — PROGRESS NOTES
"Chief Complaint  Follow up    Anxiety      She feels she can't swallow and throat goes numb with lightheadedness after injecting the lovenox. This lasts about 30 min. These symptoms not present now but are present after injecting.    Her eye started to water last night and crust. No vision changes.       She feels anxiety is stable.    No other complaints today.    The following portions of the patient's history were reviewed and updated as appropriate: allergies, current medications, past family history, past medical history, past social history, past surgical history, and problem list.    OBJECTIVE:  /76   Pulse 110   Temp 98.4 °F (36.9 °C) (Temporal)   Resp 18   Ht 165.1 cm (65\")   Wt 68.5 kg (151 lb)   SpO2 97%   BMI 25.13 kg/m²       Physical Exam  Constitutional:       General: She is not in acute distress.     Appearance: Normal appearance.   HENT:      Head: Normocephalic and atraumatic.   Eyes:      Extraocular Movements: Extraocular movements intact.      Comments: Perrla  Yellow discharge coming from left eye   Cardiovascular:      Rate and Rhythm: Normal rate and regular rhythm.      Heart sounds: No murmur heard.  Pulmonary:      Effort: Pulmonary effort is normal. No respiratory distress.      Breath sounds: Normal breath sounds. No stridor. No wheezing, rhonchi or rales.   Skin:     Findings: No rash.   Neurological:      General: No focal deficit present.      Mental Status: She is alert.   Psychiatric:         Mood and Affect: Mood normal.                    Assessment and Plan   Diagnoses and all orders for this visit:    1. Dizziness (Primary)    2. Eye discharge      Likely viral conjunctivitis . Recommend warm compress 4 times daily for 5 min and to call if any new /non resolving symptoms , consider ointment antibiotic.     Dizziness and difficulty swallowing after lovenox:  Advised her to hold the lovenox and discuss this with her gynecology office today to see if they have an " alternative medication for her.     Return in about 6 months (around 12/18/2024) for Annual.       Diana Dorado D.O.  [facility] [place]

## 2024-06-19 ENCOUNTER — TELEPHONE (OUTPATIENT)
Dept: OBSTETRICS AND GYNECOLOGY | Facility: CLINIC | Age: 24
End: 2024-06-19
Payer: MEDICAID

## 2024-06-19 NOTE — TELEPHONE ENCOUNTER
Called and spoke with patient.  She states that she was seen by PCP yesterday.  She states that she has been having a 'numbing' of the throat as well as getting headaches and experiencing dizziness that was happening when starting the Lovenox.  She was told by PCP to report to Dr. Thompson to explore another option.  She stopped the Lovenox 2 days ago, last dose on 6/17, none taken on 6/18.

## 2024-06-19 NOTE — TELEPHONE ENCOUNTER
Called and spoke with patient, she states that she is not currently breast feeding or pumping at all.

## 2024-06-19 NOTE — TELEPHONE ENCOUNTER
JAGUAR    Received call from patient stating she went to PCP and informed of symptoms. Per PCP, she needs to stop blood thinners due to possible allergic reaction. Patient states PCP would her to discuss options with OB provider regarding another alternative.

## 2024-06-20 ENCOUNTER — PATIENT OUTREACH (OUTPATIENT)
Dept: CALL CENTER | Facility: HOSPITAL | Age: 24
End: 2024-06-20
Payer: MEDICAID

## 2024-06-20 NOTE — TELEPHONE ENCOUNTER
Patient returned call and call was transferred to triage.  Spoke with patient and informed her of Dr. Thompson's advisement, she verified understanding and was appreciative of call.

## 2024-06-20 NOTE — TELEPHONE ENCOUNTER
If she cannot continue the lovenox then I would recommend xarelto 20 mg daily through 6 weeks postpartum. I would recommend stopping ibuprofen and lovenox now. Please let me know if she has any questions or concerns.     New Medications Ordered This Visit   Medications    rivaroxaban (XARELTO) 20 MG tablet     Sig: Take 1 tablet by mouth Daily for 30 days.     Dispense:  30 tablet     Refill:  0     Thanks, Daria Thompson MD

## 2024-06-20 NOTE — OUTREACH NOTE
Motherhood Connection Survey      Flowsheet Row Responses   [facility] [place] patient discharged from? [place]   Week 1 attempt successful? Yes   Call start time 1538   Call end time 1545   Baby sex Boy    discharged home with mother? Yes   Baby sex Boy   Delivery type Vaginal   Emotional state Acceptance   Family support Yes   Do you have all necessary resources to care for you and your baby?  Yes   Have members of your household adjusted to your baby? Yes   Did you have any problems with pre-eclampsia during this pregnancy? No   Did you have blood glucose issues during this pregnancy No   Lochia amount Light   Lochia per patient report Serosa   Did you have an episiotomy/tear/abdominal incision? Yes   Feeding Method Bottle   Frequency q 2hrs   Amount 4oz   Number of wet diapers x 24 hours 8-10   Last BM x 24 hours q 1-2 days   Umbilical Cord No reported signs or symptoms   Umbilical cord comments cord off   Was the baby circumcised? Yes   Circumcision care and signs/symptoms to report Reviewed   Circumcision comments healed   Where does the baby usually sleep? Bassinet   Are there stuffed animals, toys, pillows, quilts, blankets, wedges, positioners, bumpers or other loose bedding in the infant's sleeping environment? No   Does the baby ever share a sleep surface in a bed, couch, recliner or other? No   What position do you lay your baby down to sleep? Back   Are you and/or other caregivers smoking inside or outside the baby's home? No   Mom appointment comments: PP f/u scheduled   Baby appointment comments: Peds Scids testing, visits x2, gaining weight   Call completed? Yes   How satisfied were you with the Motherhood Connection Program? 5              Felipa KYLE - Registered Nurse

## 2024-07-17 PROBLEM — Z67.91 RH NEGATIVE STATE IN ANTEPARTUM PERIOD: Status: RESOLVED | Noted: 2024-01-17 | Resolved: 2024-07-17

## 2024-07-17 PROBLEM — D68.51 FACTOR V LEIDEN MUTATION: Status: RESOLVED | Noted: 2022-03-28 | Resolved: 2024-07-17

## 2024-07-17 PROBLEM — O36.0120 ANTI-D ANTIBODIES PRESENT DURING PREGNANCY IN SECOND TRIMESTER: Status: RESOLVED | Noted: 2024-01-17 | Resolved: 2024-07-17

## 2024-07-17 PROBLEM — Z88.0 PENICILLIN ALLERGY: Status: RESOLVED | Noted: 2023-11-21 | Resolved: 2024-07-17

## 2024-07-17 PROBLEM — O26.899 RH NEGATIVE STATE IN ANTEPARTUM PERIOD: Status: RESOLVED | Noted: 2024-01-17 | Resolved: 2024-07-17

## 2024-07-17 RX ORDER — RIVAROXABAN 20 MG/1
20 TABLET, FILM COATED ORAL DAILY
Qty: 30 TABLET | Refills: 0 | OUTPATIENT
Start: 2024-07-17

## 2024-07-17 NOTE — TELEPHONE ENCOUNTER
Have reviewed Dr. Thompson's notes.  I do not think her plan was to continue with anticoagulation beyond 6 weeks.  If she has been on Xarelto prescribed by another provider such as her primary care she should discuss that with that provider.

## 2024-07-26 ENCOUNTER — POSTPARTUM VISIT (OUTPATIENT)
Dept: OBSTETRICS AND GYNECOLOGY | Facility: CLINIC | Age: 24
End: 2024-07-26
Payer: MEDICAID

## 2024-07-26 VITALS
SYSTOLIC BLOOD PRESSURE: 118 MMHG | BODY MASS INDEX: 25.96 KG/M2 | WEIGHT: 156 LBS | DIASTOLIC BLOOD PRESSURE: 70 MMHG | RESPIRATION RATE: 16 BRPM

## 2024-07-26 RX ORDER — BUSPIRONE HYDROCHLORIDE 15 MG/1
15 TABLET ORAL 2 TIMES DAILY
Qty: 60 TABLET | Refills: 5 | Status: SHIPPED | OUTPATIENT
Start: 2024-07-26 | End: 2025-07-26

## 2024-07-26 RX ORDER — CITALOPRAM HYDROBROMIDE 10 MG/1
10 TABLET ORAL DAILY
Qty: 30 TABLET | Refills: 3 | Status: SHIPPED | OUTPATIENT
Start: 2024-07-26 | End: 2024-11-23

## 2024-07-26 RX ORDER — DROSPIRENONE 4 MG/1
1 TABLET, FILM COATED ORAL DAILY
Qty: 28 TABLET | Refills: 3 | Status: SHIPPED | OUTPATIENT
Start: 2024-07-26

## 2024-07-26 NOTE — PROGRESS NOTES
Subjective   Chief Complaint   Patient presents with    Postpartum Care     Huyen Interiano is a 24 y.o. year old  presenting to be seen for her postpartum visit.  She had a vaginal delivery.  Her son is doing well.    Since delivery she has been sexually active.  She does not have concerns about post-partum blues/depression.   [place] Score = 10 She reports worse feelings in the first 2 weeks but feels fairly stable now. She reports being on citalopram 10 mg daily prior to pregnancy, but is hesitant to restart it now because she would like to resume breastfeeding.   She is  formula feeding, but hoping to increase milk production. She was on xarelto so had stopped pumping/breastfeeding, but has stopped that now .  For ongoing contraception, her plans are OCP's (progestin only).    The following portions of the patient's history were reviewed and updated as appropriate:current medications, allergies, past medical history, past social history, and past surgical history    Social History    Tobacco Use      Smoking status: Never        Passive exposure: Never      Smokeless tobacco: Never      Review of Systems  Constitutional POS: nothing reported    NEG: anorexia or night sweats   Genitourinary POS: nothing reported    NEG: dysuria or hematuria      Gastointestinal POS: nothing reported    NEG: bloating, change in bowel habits, melena, or reflux symptoms   Breast POS: nothing reported    NEG: persistent breast lump, skin dimpling, or nipple discharge        Objective   /70   Resp 16   Wt 70.8 kg (156 lb)   Breastfeeding No   BMI 25.96 kg/m²     General: well developed; well nourished  no acute distress   Skin: No suspicious lesions seen   Thyroid: normal to inspection and palpation   Heart:  regular rate and rhythm, S1, S2 normal, no murmur, click, rub or gallop   Lungs: breathing is unlabored  clear to auscultation bilaterally   Breasts:  Examined in supine position  Symmetric without masses  or skin dimpling  Nipples normal without inversion, lesions or discharge  There are no palpable axillary nodes   Abdomen: soft, non-tender; no masses  no umbilical or inguinal hernias are present  no hepato-splenomegaly   Pelvis: Clinical staff was present for exam  External genitalia:  normal appearance of the external genitalia including Bartholin's and [place]'s glands.  :  urethral meatus normal;  Vaginal:  normal pink mucosa without prolapse or lesions.  Cervix:  normal appearance.  Uterus:  normal size, shape and consistency.  Adnexa:  normal bimanual exam of the adnexa.        Assessment   Normal 6 week postpartum exam  Contraceptive counseling  Factor V Leiden mutation  History of anxiety/depression     Plan   BC options reviewed and compared today: OCP (progestin only) given Factor V Leiden mutation recommend no estrogen containing products. Rx given, she plans to start with next menses.   Discussed risk vs benefit of SSRI with breastfeeding. She is unsure, but rx given in case she decides to start it back. Additionally gave rx for buspirone prn anxiety  The importance of keeping all planned follow-up and taking all medications as prescribed was emphasized.  Follow up for annual exam and medication check  3 months    New Medications Ordered This Visit   Medications    citalopram (CeleXA) 10 MG tablet     Sig: Take 1 tablet by mouth Daily for 120 days.     Dispense:  30 tablet     Refill:  3    busPIRone (BUSPAR) 15 MG tablet     Sig: Take 1 tablet by mouth 2 (Two) Times a Day.     Dispense:  60 tablet     Refill:  5    Slynd 4 MG tablet     Sig: Take 1 tablet by mouth Daily. Use [facility] ([place])     Dispense:  28 tablet     Refill:  3          This note was electronically signed.    Ronda Garcias, CHAPIN    July 26, 2024

## 2024-08-07 ENCOUNTER — OFFICE VISIT (OUTPATIENT)
Dept: FAMILY MEDICINE CLINIC | Facility: CLINIC | Age: 24
End: 2024-08-07
Payer: MEDICAID

## 2024-08-07 VITALS
OXYGEN SATURATION: 100 % | SYSTOLIC BLOOD PRESSURE: 120 MMHG | HEART RATE: 98 BPM | BODY MASS INDEX: 26.59 KG/M2 | WEIGHT: 159.6 LBS | HEIGHT: 65 IN | TEMPERATURE: 97.5 F | DIASTOLIC BLOOD PRESSURE: 82 MMHG

## 2024-08-07 DIAGNOSIS — R51.9 WORSENING HEADACHES: ICD-10-CM

## 2024-08-07 DIAGNOSIS — H43.393 VITREOUS FLOATERS OF BOTH EYES: Primary | ICD-10-CM

## 2024-08-07 NOTE — PROGRESS NOTES
"    Follow Up Office Visit      Date: 2024   Patient Name: Huyen Interiano  : 2000   MRN: [id number]     Chief Complaint:    Chief Complaint   Patient presents with    Headache    Floaters in eyes with HA     X 2 weeks       History of Present Illness: Huyen Interinao is a 24 y.o. female who presents today scheduled for headaches and dizzy.    With Briget, grandmother.    Sees Dr. Dorado for PCP.  Patient last seen 2024 per PCP.    Reports last week started having headaches and getting dizzy afterwards.  Can hurt in different spots.  Reports on the sides and occ in the back of head.    Reports can be sharp stabbing pain.    Generally last for a few minutes to up to 30 minutes.      Also reports has been having floaters in her eyes.  Reports \"stringy things in field of view\".    Reports that this comes and goes.  Reports has been having these for a while.    Has not noticed vision change before headaches.      Has Factor V Leiden carrier.    Stopped Xarelto.  Reports was to just take post partum.  Reports she took Xarelto for about 2 months.  Was prescribed by her Gyn provider.            Subjective      Review of Systems:   Review of Systems   Constitutional:  Negative for chills and fever.   Gastrointestinal:  Negative for nausea and vomiting.   Neurological:  Positive for headache. Negative for seizures and syncope.   Psychiatric/Behavioral:  Negative for suicidal ideas.        I have reviewed the patients family history, social history, past medical history, past surgical history and have updated it as appropriate.     Medications:     Current Outpatient Medications:     busPIRone (BUSPAR) 15 MG tablet, Take 1 tablet by mouth 2 (Two) Times a Day., Disp: 60 tablet, Rfl: 5    citalopram (CeleXA) 10 MG tablet, Take 1 tablet by mouth Daily for 120 days. (Patient not taking: Reported on 2024), Disp: 30 tablet, Rfl: 3    rivaroxaban (XARELTO) 20 MG tablet, Take 1 tablet by mouth Daily for " "30 days., Disp: 30 tablet, Rfl: 0    Slynd 4 MG tablet, Take 1 tablet by mouth Daily. Use [facility] ([place]) (Patient not taking: Reported on 8/7/2024), Disp: 28 tablet, Rfl: 3    Allergies:   Allergies   Allergen Reactions    Penicillins Hives       Objective     Physical Exam: Please see above  Vital Signs:   Vitals:    08/07/24 1146   BP: 120/82   Pulse: 98   Temp: 97.5 °F (36.4 °C)   TempSrc: Temporal   SpO2: 100%   Weight: 72.4 kg (159 lb 9.6 oz)   Height: 165.1 cm (65\")   PainSc: 0-No pain     Body mass index is 26.56 kg/m².  BMI is >= 25 and <30. (Overweight) The following options were offered after discussion;: exercise counseling/recommendations and nutrition counseling/recommendations       Physical Exam  Vitals and nursing note reviewed.   Constitutional:       Appearance: Normal appearance.   HENT:      Head: Normocephalic and atraumatic.   Neck:      Vascular: No carotid bruit.   Cardiovascular:      Rate and Rhythm: Normal rate and regular rhythm.      Heart sounds: Normal heart sounds. No murmur heard.  Pulmonary:      Effort: Pulmonary effort is normal.      Breath sounds: Normal breath sounds.   Abdominal:      General: Bowel sounds are normal.      Palpations: Abdomen is soft. There is no mass.      Tenderness: There is no abdominal tenderness.   Musculoskeletal:      Right lower leg: No edema.      Left lower leg: No edema.   Skin:     Coloration: Skin is not jaundiced or pale.      Findings: No erythema.   Neurological:      Mental Status: She is alert. Mental status is at baseline.      Cranial Nerves: No cranial nerve deficit (CN 2-12 grossly intact.).   Psychiatric:         Mood and Affect: Mood normal.         Behavior: Behavior normal.         Procedures    Results:   Labs:   Hemoglobin A1C   Date Value Ref Range Status   04/18/2022 5.00 4.80 - 5.60 % Final     TSH   Date Value Ref Range Status   01/04/2024 1.310 0.270 - 4.200 uIU/mL Final        POCT Results (if " applicable):   Results for orders placed or performed during the hospital encounter of 06/03/24   Preeclampsia Panel    Specimen: Blood   Result Value Ref Range    Alkaline Phosphatase 113 39 - 117 U/L    ALT (SGPT) 9 1 - 33 U/L    AST (SGOT) 15 1 - 32 U/L    Creatinine 0.51 (L) 0.57 - 1.00 mg/dL    Total Bilirubin 0.2 0.0 - 1.2 mg/dL     135 - 214 U/L    Uric Acid 3.7 2.4 - 5.7 mg/dL   T Pallidum Antibody w/ reflex RPR (Syphilis)    Specimen: Blood   Result Value Ref Range    Treponemal AB Total Non-Reactive Non-Reactive   CBC Auto Differential    Specimen: Blood   Result Value Ref Range    WBC 6.95 3.40 - 10.80 10*3/mm3    RBC 4.06 3.77 - 5.28 10*6/mm3    Hemoglobin 12.0 12.0 - 15.9 g/dL    Hematocrit 35.9 34.0 - 46.6 %    MCV 88.4 79.0 - 97.0 fL    MCH 29.6 26.6 - 33.0 pg    MCHC 33.4 31.5 - 35.7 g/dL    RDW 12.4 12.3 - 15.4 %    RDW-SD 40.0 37.0 - 54.0 fl    MPV 11.2 6.0 - 12.0 fL    Platelets 153 140 - 450 10*3/mm3    Neutrophil % 56.5 42.7 - 76.0 %    Lymphocyte % 31.7 19.6 - 45.3 %    Monocyte % 10.6 5.0 - 12.0 %    Eosinophil % 0.6 0.3 - 6.2 %    Basophil % 0.3 0.0 - 1.5 %    Immature Grans % 0.3 0.0 - 0.5 %    Neutrophils, Absolute 3.93 1.70 - 7.00 10*3/mm3    Lymphocytes, Absolute 2.20 0.70 - 3.10 10*3/mm3    Monocytes, Absolute 0.74 0.10 - 0.90 10*3/mm3    Eosinophils, Absolute 0.04 0.00 - 0.40 10*3/mm3    Basophils, Absolute 0.02 0.00 - 0.20 10*3/mm3    Immature Grans, Absolute 0.02 0.00 - 0.05 10*3/mm3    nRBC 0.0 0.0 - 0.2 /100 WBC   CBC (No Diff)    Specimen: Blood   Result Value Ref Range    WBC 16.96 (H) 3.40 - 10.80 10*3/mm3    RBC 3.66 (L) 3.77 - 5.28 10*6/mm3    Hemoglobin 11.1 (L) 12.0 - 15.9 g/dL    Hematocrit 32.0 (L) 34.0 - 46.6 %    MCV 87.4 79.0 - 97.0 fL    MCH 30.3 26.6 - 33.0 pg    MCHC 34.7 31.5 - 35.7 g/dL    RDW 12.3 12.3 - 15.4 %    RDW-SD 39.2 37.0 - 54.0 fl    MPV 10.9 6.0 - 12.0 fL    Platelets 145 140 - 450 10*3/mm3   CBC Auto Differential    Specimen: Blood   Result Value  Ref Range    WBC 13.32 (H) 3.40 - 10.80 10*3/mm3    RBC 3.40 (L) 3.77 - 5.28 10*6/mm3    Hemoglobin 10.3 (L) 12.0 - 15.9 g/dL    Hematocrit 30.0 (L) 34.0 - 46.6 %    MCV 88.2 79.0 - 97.0 fL    MCH 30.3 26.6 - 33.0 pg    MCHC 34.3 31.5 - 35.7 g/dL    RDW 12.5 12.3 - 15.4 %    RDW-SD 40.2 37.0 - 54.0 fl    MPV 10.9 6.0 - 12.0 fL    Platelets 132 (L) 140 - 450 10*3/mm3    Neutrophil % 75.5 42.7 - 76.0 %    Lymphocyte % 14.3 (L) 19.6 - 45.3 %    Monocyte % 9.2 5.0 - 12.0 %    Eosinophil % 0.2 (L) 0.3 - 6.2 %    Basophil % 0.2 0.0 - 1.5 %    Immature Grans % 0.6 (H) 0.0 - 0.5 %    Neutrophils, Absolute 10.08 (H) 1.70 - 7.00 10*3/mm3    Lymphocytes, Absolute 1.90 0.70 - 3.10 10*3/mm3    Monocytes, Absolute 1.22 (H) 0.10 - 0.90 10*3/mm3    Eosinophils, Absolute 0.02 0.00 - 0.40 10*3/mm3    Basophils, Absolute 0.02 0.00 - 0.20 10*3/mm3    Immature Grans, Absolute 0.08 (H) 0.00 - 0.05 10*3/mm3    nRBC 0.0 0.0 - 0.2 /100 WBC   CBC (No Diff)    Specimen: Blood   Result Value Ref Range    WBC 12.55 (H) 3.40 - 10.80 10*3/mm3    RBC 3.62 (L) 3.77 - 5.28 10*6/mm3    Hemoglobin 10.7 (L) 12.0 - 15.9 g/dL    Hematocrit 32.3 (L) 34.0 - 46.6 %    MCV 89.2 79.0 - 97.0 fL    MCH 29.6 26.6 - 33.0 pg    MCHC 33.1 31.5 - 35.7 g/dL    RDW 12.7 12.3 - 15.4 %    RDW-SD 41.6 37.0 - 54.0 fl    MPV 11.0 6.0 - 12.0 fL    Platelets 139 (L) 140 - 450 10*3/mm3   ECG 12 Lead Tachycardia   Result Value Ref Range    QT Interval 272 ms    QTC Interval 419 ms   Type & Screen    Specimen: Blood   Result Value Ref Range    ABO Type O     RH type Negative     Antibody Screen Positive     T&S Expiration Date 6/6/2024 11:59:59 PM    Antibody Identification    Specimen: Blood   Result Value Ref Range    Residual RhIG Detected RESIDUAL RHIG DETECTED    Postpartum RhIg Evaluation    Specimen: Blood   Result Value Ref Range    ABO Type O     RH type Negative    Fetal Bleed Screen    Specimen: Blood   Result Value Ref Range    Fetal Bleed Screen Negative    Doses  of Rh Immune Globulin    Specimen: Blood   Result Value Ref Range    Number of Doses Fetal Screen Negative - Recommend 1 vial of RhIg        Imaging:   No valid procedures specified.     Measures:   Advanced Care Planning:   Patient does not have an advance directive, declines further assistance.    Smoking Cessation:   Does not smoke      Assessment / Plan      Assessment/Plan:     Considered Nurtec or Ubrelvy sample.  Patient wanted to wait.  Wanted to see Ophthalmology first.  Referral placed for Ophthalmology.      1. Vitreous floaters of both eyes    - Ambulatory Referral to Ophthalmology    2. Worsening headaches        Part of this note may be an electronic transcription/translation of spoken language to printed text using the Dragon Dictation System.      Discussed with patient to see ophthalmology.  Can take Ibuprofen or Tylenol for headaches.        Vaccine Counseling:      Follow Up:   Return for after seeing Ophthalmology-with Dr. Dorado..      DO BRUNA Rivera

## 2024-08-19 ENCOUNTER — TELEPHONE (OUTPATIENT)
Dept: OBSTETRICS AND GYNECOLOGY | Facility: CLINIC | Age: 24
End: 2024-08-19
Payer: MEDICAID

## 2024-08-19 DIAGNOSIS — Z78.9 DATE OF LAST MENSTRUAL PERIOD (LMP) UNKNOWN: Primary | ICD-10-CM

## 2024-08-19 NOTE — TELEPHONE ENCOUNTER
Given proximity to last pregnancy, patient to still report to lab for quantitative hcg.  Called and spoke with patient, she verified understanding.

## 2024-08-19 NOTE — TELEPHONE ENCOUNTER
Called and spoke with patient, she states she does believe she had a period since delivery but does not remember at all when it was.  Entered blood quantitative hCG lab for patient.

## 2024-08-19 NOTE — TELEPHONE ENCOUNTER
The following duplicate encounter received:     Kaylyn Villela RegSched Rep1 hour ago (3:23 PM)     TB  Pt calling and July 18th, 2024 was her last menstrual period.      Thank you

## 2024-08-19 NOTE — TELEPHONE ENCOUNTER
Jay     Pt called and stated that she is 3 MO. Postpartum and has a positive pregnancy test. Pt has not yet had her menses so is unable of her date of conception

## 2024-08-27 ENCOUNTER — LAB (OUTPATIENT)
Dept: LAB | Facility: HOSPITAL | Age: 24
End: 2024-08-27
Payer: MEDICAID

## 2024-08-27 DIAGNOSIS — Z78.9 DATE OF LAST MENSTRUAL PERIOD (LMP) UNKNOWN: ICD-10-CM

## 2024-08-27 LAB — HCG INTACT+B SERPL-ACNC: 1567 MIU/ML

## 2024-08-27 PROCEDURE — 84702 CHORIONIC GONADOTROPIN TEST: CPT

## 2024-08-27 PROCEDURE — 36415 COLL VENOUS BLD VENIPUNCTURE: CPT

## 2024-09-10 ENCOUNTER — INITIAL PRENATAL (OUTPATIENT)
Dept: OBSTETRICS AND GYNECOLOGY | Facility: CLINIC | Age: 24
End: 2024-09-10
Payer: MEDICAID

## 2024-09-10 VITALS — DIASTOLIC BLOOD PRESSURE: 80 MMHG | SYSTOLIC BLOOD PRESSURE: 126 MMHG | WEIGHT: 164 LBS | BODY MASS INDEX: 27.29 KG/M2

## 2024-09-10 DIAGNOSIS — Z3A.01 LESS THAN 8 WEEKS GESTATION OF PREGNANCY: Primary | ICD-10-CM

## 2024-09-10 PROBLEM — O99.111 HETEROZYGOUS FACTOR V LEIDEN AFFECTING PREGNANCY IN FIRST TRIMESTER, ANTEPARTUM: Status: ACTIVE | Noted: 2024-09-10

## 2024-09-10 PROBLEM — O09.41 SUPERVISION OF HIGH-RISK PREGNANCY WITH GRAND MULTIPARITY IN FIRST TRIMESTER: Status: ACTIVE | Noted: 2024-09-10

## 2024-09-10 PROBLEM — Z87.59 HISTORY OF GESTATIONAL HYPERTENSION: Status: ACTIVE | Noted: 2024-09-10

## 2024-09-10 PROBLEM — O13.3 GESTATIONAL HYPERTENSION WITHOUT SIGNIFICANT PROTEINURIA IN THIRD TRIMESTER: Status: RESOLVED | Noted: 2024-06-03 | Resolved: 2024-09-10

## 2024-09-10 PROBLEM — D68.51 HETEROZYGOUS FACTOR V LEIDEN AFFECTING PREGNANCY IN FIRST TRIMESTER, ANTEPARTUM: Status: ACTIVE | Noted: 2024-09-10

## 2024-09-10 PROBLEM — O09.891 SHORT INTERVAL BETWEEN PREGNANCIES AFFECTING PREGNANCY IN FIRST TRIMESTER, ANTEPARTUM: Status: ACTIVE | Noted: 2024-09-10

## 2024-09-10 LAB
AMPHET+METHAMPHET UR QL: NEGATIVE
AMPHETAMINES UR QL: NEGATIVE
BARBITURATES UR QL SCN: NEGATIVE
BENZODIAZ UR QL SCN: NEGATIVE
BILIRUB UR QL STRIP: NEGATIVE
BUPRENORPHINE SERPL-MCNC: NEGATIVE NG/ML
CANNABINOIDS SERPL QL: NEGATIVE
CLARITY UR: CLEAR
COCAINE UR QL: NEGATIVE
COLOR UR: YELLOW
FENTANYL UR-MCNC: NEGATIVE NG/ML
GLUCOSE UR STRIP-MCNC: NEGATIVE MG/DL
HGB UR QL STRIP.AUTO: NEGATIVE
KETONES UR QL STRIP: NEGATIVE
LEUKOCYTE ESTERASE UR QL STRIP.AUTO: NEGATIVE
METHADONE UR QL SCN: NEGATIVE
NITRITE UR QL STRIP: NEGATIVE
OPIATES UR QL: NEGATIVE
OXYCODONE UR QL SCN: NEGATIVE
PCP UR QL SCN: NEGATIVE
PH UR STRIP.AUTO: 7 [PH] (ref 5–8)
PROT UR QL STRIP: NEGATIVE
SP GR UR STRIP: 1.01 (ref 1–1.03)
TRICYCLICS UR QL SCN: NEGATIVE
UROBILINOGEN UR QL STRIP: NORMAL

## 2024-09-10 PROCEDURE — 81003 URINALYSIS AUTO W/O SCOPE: CPT | Performed by: STUDENT IN AN ORGANIZED HEALTH CARE EDUCATION/TRAINING PROGRAM

## 2024-09-10 PROCEDURE — 80307 DRUG TEST PRSMV CHEM ANLYZR: CPT | Performed by: STUDENT IN AN ORGANIZED HEALTH CARE EDUCATION/TRAINING PROGRAM

## 2024-09-10 PROCEDURE — 99214 OFFICE O/P EST MOD 30 MIN: CPT | Performed by: STUDENT IN AN ORGANIZED HEALTH CARE EDUCATION/TRAINING PROGRAM

## 2024-09-10 NOTE — PROGRESS NOTES
Subjective   Chief Complaint   Patient presents with    Initial Prenatal Visit     Last pap: 2023. US done today       Huyen Interiano is a 24 y.o. year old .  Patient's last menstrual period was 2024.  She presents for prenatal care. Patient did not start Slynd after postpartum visit last pregnancy. This pregnancy was unintentional but is desired. She denies any vaginal bleeding or leakage of fluid. Currently is struggling with some diarrhea and lack of appetite, but has not yet had any nausea. Has not yet started a prenatal vitamin.     Pregnancy is complicated by:  - Short interval pregnancy - delivered 2024  - History of GHTN last pregnancy (2024)  - Factor V Leiden heterozygous   - Anxiety/depression    Social History    Tobacco Use      Smoking status: Never        Passive exposure: Never      Smokeless tobacco: Never      The following portions of the patient's history were reviewed and updated as appropriate:vital signs, allergies, current medications, past medical history, past social history, past surgical history, and problem list.    Objective   Lab Review   PAP - NILM     Imaging   Pelvic ultrasound report    Assessment & Plan   ASSESSMENT/PLAN    24 y.o. year old  at 6w4d by ultrasound today (off dates - re-dated today)  Supervision of high risk pregnancy  Rh negative    Routine prenatal care   - The problem list for pregnancy was updated today  - Tests ordered today:  Orders Placed This Encounter   Procedures    OneSwab - Kit, Vagina     Standing Status:   Future     Number of Occurrences:   1     Standing Expiration Date:   9/10/2025     Order Specific Question:   Release to patient     Answer:   Routine Release [[id number]]    Chlamydia trachomatis, Neisseria gonorrhoeae, Trichomonas vaginalis, PCR - Swab, Cervix     Standing Status:   Future     Standing Expiration Date:   10/10/2024     Order Specific Question:   Release to patient     Answer:   Routine  Release [[id number]]    Urine Drug Screen - Urine, Clean Catch     Please confirm all positive UDS     Standing Status:   Future     Number of Occurrences:   1     Order Specific Question:   Release to patient     Answer:   Routine Release [[id number]]    Urinalysis With Culture If Indicated -     Standing Status:   Future     Number of Occurrences:   1     Standing Expiration Date:   9/10/2025     Order Specific Question:   Release to patient     Answer:   Routine Release [[id number]]    OB Panel With HIV and RPR     Standing Status:   Future     Standing Expiration Date:   9/10/2025     Order Specific Question:   Release to patient     Answer:   Routine Release [[id number]]    Hepatitis C Antibody     Standing Status:   Future     Standing Expiration Date:   9/10/2025     Order Specific Question:   Release to patient     Answer:   Routine Release [[id number]]     - Testing for GC / Chlamydia / trichomonas was done today  - Genetic testing reviewed: patient desires to have NIPT done at next visit (10.5 weeks GA)  - Recommend starting prenatal vitamin  - Reviewed pregnancy diet recommendations, nausea treatment  - Prenatal anatomy ultrasound at 20 weeks   - Needs influenza vaccine this pregnancy    Hx GHTN in last pregnancy    - Recommend starting baby ASA 81 mg daily due to history of GHTN as well as factor V Leiden    - Patient took this in past, but did have some tachycardia which she associated with this medication. Will monitor for tachycardia and consider EKG/Holter if this becomes persistent.     Factor V Leiden heterozygous    - Start ASA 81 mg daily    - Was on anticoagulation x 6 weeks postpartum in last pregnancy (switched from Lovenox to Xarelto due to allergy). Will plan for xarelto x 6 weeks postpartum if not nursing this postpartum period.   Short interval pregnancy   - Perineum well healed from last delivery   - Discussed birth control options. Will plan for POP in immediate postpartum period to  prevent another short interval pregnancy. Patient is afraid of needles/shots.   Information reviewed: nutrition in pregnancy    Total time spent today with Huyen  was 30 minutes (level 4).  Off this time, > 50% was spent face-to-face time coordinating care, answering her questions and counseling regarding contraceptive choices and efficacy rates, exercise in pregnancy, nutrition in pregnancy, weight gain in pregnancy, work and travel restrictions during pregnancy, list of OTC medications acceptable in pregnancy and call coverage groups, and healthy eating habits.  The time reported only includes face-to-face time and excludes any procedural based activities that occurred on the same date of service.    Follow up: 4 week(s)       This note was electronically signed.    Valerie Martins MD  Obstetrics and Gynecology  [facility]

## 2024-09-11 LAB — HOLD SPECIMEN: NORMAL

## 2024-09-11 NOTE — PROGRESS NOTES
Your urinalysis and urine drug screen were normal. The rest of your prenatal labs are still in process.

## 2024-09-17 ENCOUNTER — TELEPHONE (OUTPATIENT)
Dept: OBSTETRICS AND GYNECOLOGY | Facility: CLINIC | Age: 24
End: 2024-09-17
Payer: MEDICAID

## 2024-09-17 DIAGNOSIS — R00.0 TACHYCARDIA: Primary | ICD-10-CM

## 2024-09-30 ENCOUNTER — APPOINTMENT (OUTPATIENT)
Dept: CT IMAGING | Facility: HOSPITAL | Age: 24
End: 2024-09-30
Payer: MEDICAID

## 2024-09-30 ENCOUNTER — APPOINTMENT (OUTPATIENT)
Dept: GENERAL RADIOLOGY | Facility: HOSPITAL | Age: 24
End: 2024-09-30
Payer: MEDICAID

## 2024-09-30 ENCOUNTER — HOSPITAL ENCOUNTER (EMERGENCY)
Facility: HOSPITAL | Age: 24
Discharge: HOME | End: 2024-09-30
Attending: EMERGENCY MEDICINE
Payer: MEDICAID

## 2024-09-30 ENCOUNTER — TELEPHONE (OUTPATIENT)
Dept: OBSTETRICS AND GYNECOLOGY | Facility: CLINIC | Age: 24
End: 2024-09-30
Payer: MEDICAID

## 2024-09-30 VITALS
OXYGEN SATURATION: 98 % | HEART RATE: 90 BPM | SYSTOLIC BLOOD PRESSURE: 125 MMHG | RESPIRATION RATE: 16 BRPM | DIASTOLIC BLOOD PRESSURE: 86 MMHG | TEMPERATURE: 98.2 F | WEIGHT: 165 LBS | BODY MASS INDEX: 27.49 KG/M2 | HEIGHT: 65 IN

## 2024-09-30 DIAGNOSIS — Z86.711 HISTORY OF PULMONARY EMBOLISM: ICD-10-CM

## 2024-09-30 DIAGNOSIS — Z34.91 FIRST TRIMESTER PREGNANCY: ICD-10-CM

## 2024-09-30 DIAGNOSIS — R07.9 CHEST PAIN, UNSPECIFIED TYPE: Primary | ICD-10-CM

## 2024-09-30 LAB
ALBUMIN SERPL-MCNC: 4.1 G/DL (ref 3.5–5.2)
ALBUMIN/GLOB SERPL: 1.7 G/DL
ALP SERPL-CCNC: 51 U/L (ref 39–117)
ALT SERPL W P-5'-P-CCNC: 11 U/L (ref 1–33)
ANION GAP SERPL CALCULATED.3IONS-SCNC: 13 MMOL/L (ref 5–15)
AST SERPL-CCNC: 15 U/L (ref 1–32)
BASOPHILS # BLD AUTO: 0.02 10*3/MM3 (ref 0–0.2)
BASOPHILS NFR BLD AUTO: 0.2 % (ref 0–1.5)
BILIRUB SERPL-MCNC: 0.2 MG/DL (ref 0–1.2)
BUN SERPL-MCNC: 8 MG/DL (ref 6–20)
BUN/CREAT SERPL: 14 (ref 7–25)
CALCIUM SPEC-SCNC: 9.1 MG/DL (ref 8.6–10.5)
CHLORIDE SERPL-SCNC: 105 MMOL/L (ref 98–107)
CO2 SERPL-SCNC: 20 MMOL/L (ref 22–29)
CREAT SERPL-MCNC: 0.57 MG/DL (ref 0.57–1)
D DIMER PPP FEU-MCNC: 0.9 MCGFEU/ML (ref 0–0.5)
DEPRECATED RDW RBC AUTO: 37.1 FL (ref 37–54)
EGFRCR SERPLBLD CKD-EPI 2021: 132 ML/MIN/1.73
EOSINOPHIL # BLD AUTO: 0.07 10*3/MM3 (ref 0–0.4)
EOSINOPHIL NFR BLD AUTO: 0.8 % (ref 0.3–6.2)
ERYTHROCYTE [DISTWIDTH] IN BLOOD BY AUTOMATED COUNT: 12.1 % (ref 12.3–15.4)
GLOBULIN UR ELPH-MCNC: 2.4 GM/DL
GLUCOSE SERPL-MCNC: 114 MG/DL (ref 65–99)
HCT VFR BLD AUTO: 38.7 % (ref 34–46.6)
HGB BLD-MCNC: 13.6 G/DL (ref 12–15.9)
HOLD SPECIMEN: NORMAL
IMM GRANULOCYTES # BLD AUTO: 0.02 10*3/MM3 (ref 0–0.05)
IMM GRANULOCYTES NFR BLD AUTO: 0.2 % (ref 0–0.5)
LIPASE SERPL-CCNC: 35 U/L (ref 13–60)
LYMPHOCYTES # BLD AUTO: 2.79 10*3/MM3 (ref 0.7–3.1)
LYMPHOCYTES NFR BLD AUTO: 33.7 % (ref 19.6–45.3)
MCH RBC QN AUTO: 29.7 PG (ref 26.6–33)
MCHC RBC AUTO-ENTMCNC: 35.1 G/DL (ref 31.5–35.7)
MCV RBC AUTO: 84.5 FL (ref 79–97)
MONOCYTES # BLD AUTO: 0.55 10*3/MM3 (ref 0.1–0.9)
MONOCYTES NFR BLD AUTO: 6.6 % (ref 5–12)
NEUTROPHILS NFR BLD AUTO: 4.83 10*3/MM3 (ref 1.7–7)
NEUTROPHILS NFR BLD AUTO: 58.5 % (ref 42.7–76)
NRBC BLD AUTO-RTO: 0 /100 WBC (ref 0–0.2)
NT-PROBNP SERPL-MCNC: <36 PG/ML (ref 0–450)
PLATELET # BLD AUTO: 227 10*3/MM3 (ref 140–450)
PMV BLD AUTO: 9.9 FL (ref 6–12)
POTASSIUM SERPL-SCNC: 3.6 MMOL/L (ref 3.5–5.2)
PROT SERPL-MCNC: 6.5 G/DL (ref 6–8.5)
QT INTERVAL: 344 MS
QTC INTERVAL: 441 MS
RBC # BLD AUTO: 4.58 10*6/MM3 (ref 3.77–5.28)
SODIUM SERPL-SCNC: 138 MMOL/L (ref 136–145)
TROPONIN T SERPL HS-MCNC: <6 NG/L
WBC NRBC COR # BLD AUTO: 8.28 10*3/MM3 (ref 3.4–10.8)
WHOLE BLOOD HOLD COAG: NORMAL
WHOLE BLOOD HOLD SPECIMEN: NORMAL

## 2024-09-30 PROCEDURE — 71045 X-RAY EXAM CHEST 1 VIEW: CPT

## 2024-09-30 PROCEDURE — 83880 ASSAY OF NATRIURETIC PEPTIDE: CPT | Performed by: EMERGENCY MEDICINE

## 2024-09-30 PROCEDURE — 93005 ELECTROCARDIOGRAM TRACING: CPT | Performed by: EMERGENCY MEDICINE

## 2024-09-30 PROCEDURE — 84484 ASSAY OF TROPONIN QUANT: CPT | Performed by: EMERGENCY MEDICINE

## 2024-09-30 PROCEDURE — 80053 COMPREHEN METABOLIC PANEL: CPT | Performed by: EMERGENCY MEDICINE

## 2024-09-30 PROCEDURE — 83690 ASSAY OF LIPASE: CPT | Performed by: EMERGENCY MEDICINE

## 2024-09-30 PROCEDURE — 25510000001 IOPAMIDOL PER 1 ML: Performed by: EMERGENCY MEDICINE

## 2024-09-30 PROCEDURE — 85025 COMPLETE CBC W/AUTO DIFF WBC: CPT | Performed by: EMERGENCY MEDICINE

## 2024-09-30 PROCEDURE — 85379 FIBRIN DEGRADATION QUANT: CPT | Performed by: EMERGENCY MEDICINE

## 2024-09-30 PROCEDURE — 71275 CT ANGIOGRAPHY CHEST: CPT

## 2024-09-30 PROCEDURE — 99285 EMERGENCY DEPT VISIT HI MDM: CPT

## 2024-09-30 RX ORDER — SODIUM CHLORIDE 0.9 % (FLUSH) 0.9 %
10 SYRINGE (ML) INJECTION AS NEEDED
Status: DISCONTINUED | OUTPATIENT
Start: 2024-09-30 | End: 2024-10-01 | Stop reason: HOSPADM

## 2024-09-30 RX ORDER — IOPAMIDOL 755 MG/ML
100 INJECTION, SOLUTION INTRAVASCULAR
Status: COMPLETED | OUTPATIENT
Start: 2024-09-30 | End: 2024-09-30

## 2024-09-30 RX ADMIN — IOPAMIDOL 85 ML: 755 INJECTION, SOLUTION INTRAVENOUS at 22:38

## 2024-09-30 NOTE — TELEPHONE ENCOUNTER
Pt called stating that she has been having on and off bleeding since yesterday and cramping and wants to speak with a nurse. Stated that the bleeding is not heavy

## 2024-09-30 NOTE — TELEPHONE ENCOUNTER
Patient states that she is having on and off bleeding that began three days ago. She states that it was mostly in her underwear and when she wiped, not significant enough to wear a pad. It was darker in color two days ago and was lighter yesterday. She is not having any bleeding today. She states that her cramping began at the same time. The pain is located in her left lower abdomin and centrally in lower abdomin. The pain is staying steady and will get worse, similar to period cramping. Does not change positionally. She has taken tylenol but it did not change her pain.   Most recent intercourse was last week. She has not been constipated.

## 2024-10-01 ENCOUNTER — ROUTINE PRENATAL (OUTPATIENT)
Dept: OBSTETRICS AND GYNECOLOGY | Facility: CLINIC | Age: 24
End: 2024-10-01
Payer: MEDICAID

## 2024-10-01 VITALS — DIASTOLIC BLOOD PRESSURE: 76 MMHG | BODY MASS INDEX: 26.79 KG/M2 | SYSTOLIC BLOOD PRESSURE: 110 MMHG | WEIGHT: 161 LBS

## 2024-10-01 DIAGNOSIS — D68.51 HETEROZYGOUS FACTOR V LEIDEN AFFECTING PREGNANCY IN FIRST TRIMESTER, ANTEPARTUM: ICD-10-CM

## 2024-10-01 DIAGNOSIS — O99.111 HETEROZYGOUS FACTOR V LEIDEN AFFECTING PREGNANCY IN FIRST TRIMESTER, ANTEPARTUM: ICD-10-CM

## 2024-10-01 DIAGNOSIS — Z87.59 HISTORY OF GESTATIONAL HYPERTENSION: ICD-10-CM

## 2024-10-01 DIAGNOSIS — O09.91 SUPERVISION OF HIGH RISK PREGNANCY IN FIRST TRIMESTER: ICD-10-CM

## 2024-10-01 DIAGNOSIS — O20.9 BLEEDING IN EARLY PREGNANCY: Primary | ICD-10-CM

## 2024-10-01 NOTE — PROGRESS NOTES
Subjective   Chief Complaint   Patient presents with    Routine Prenatal Visit     Bleeding early in pregnancy       Huyen Interiano is a 24 y.o.  at 9w4d. She presents for vaginal bleeding. She has had intermittent spotting over the last few days with some episodes of bright red bleeding, but this seems to have stopped over the last few days. Some cramping, but no other complaints today.     She did go to the ED last night for chest pain and had CT PE protocol - no evidence of pulmonary embolism.       Pregnancy is complicated by:  - Short interval pregnancy - delivered 2024  - History of GHTN last pregnancy (2024)  - Factor V Leiden heterozygous   - Anxiety/depression      Objective   Lab Review   PAP - NILM     /76   Wt 73 kg (161 lb)   LMP 2024   BMI 26.79 kg/m²    Gen: NAD, resting comfortably   Abdomen: soft, nontender  Pelvic: normal external female genitalia. Cervix closed/multiparous. No vaginal bleeding, no blood in vaginal vault     Assessment & Plan   ASSESSMENT/PLAN     24 y.o. year old     Supervision of high risk pregnancy  Rh negative    Routine prenatal care   - The problem list for pregnancy was updated today  - Prenatal labs not yet completed  - Genetic testing reviewed: patient desires to have NIPT done at next visit (10.5 weeks GA)  - Recommend starting prenatal vitamin  - Reviewed pregnancy diet recommendations, nausea treatment  - Prenatal anatomy ultrasound at 20 weeks   - Needs influenza vaccine this pregnancy    Vaginal bleeding in early pregnancy    - No bleeding on exam today    - rhogam given in office today given history of bleeding and Rh negative status    - Discussed pelvic rest     Hx GHTN in last pregnancy    - Stopped baby ASA 81 mg due to heart palpitations      Factor V Leiden heterozygous    - Start ASA 81 mg daily    - Was on anticoagulation x 6 weeks postpartum in last pregnancy (switched from Lovenox to Xarelto due to allergy).  Will plan for xarelto x 6 weeks postpartum if not nursing this postpartum period.    - ED visit 9/30 - no PE   Short interval pregnancy   - Perineum well healed from last delivery   - Discussed birth control options. Will plan for POP in immediate postpartum period to prevent another short interval pregnancy. Patient is afraid of needles/shots.   Information reviewed: nutrition in pregnancy      This note was electronically signed.    Valerie Martins MD  Obstetrics and Gynecology  [facility]

## 2024-10-01 NOTE — TELEPHONE ENCOUNTER
"\"Let's get her in for an appointment this week since she is O neg and may need Rhogam. She does not need to come in to ED unless heavy bleeding. \" Dr. Martins    Patient scheduled for this afternoon with Dr. Martins as requested, pt notified   "
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
## 2024-10-02 ENCOUNTER — HOSPITAL ENCOUNTER (EMERGENCY)
Facility: HOSPITAL | Age: 24
Discharge: HOME | End: 2024-10-02
Attending: EMERGENCY MEDICINE
Payer: MEDICAID

## 2024-10-02 ENCOUNTER — APPOINTMENT (OUTPATIENT)
Dept: ULTRASOUND IMAGING | Facility: HOSPITAL | Age: 24
End: 2024-10-02
Payer: MEDICAID

## 2024-10-02 VITALS
WEIGHT: 160 LBS | HEART RATE: 83 BPM | OXYGEN SATURATION: 96 % | SYSTOLIC BLOOD PRESSURE: 119 MMHG | BODY MASS INDEX: 26.66 KG/M2 | DIASTOLIC BLOOD PRESSURE: 76 MMHG | RESPIRATION RATE: 18 BRPM | HEIGHT: 65 IN | TEMPERATURE: 98.6 F

## 2024-10-02 DIAGNOSIS — Z34.90 INTRAUTERINE PREGNANCY: ICD-10-CM

## 2024-10-02 DIAGNOSIS — R10.2 PELVIC PAIN: Primary | ICD-10-CM

## 2024-10-02 LAB
ALBUMIN SERPL-MCNC: 4 G/DL (ref 3.5–5.2)
ALBUMIN/GLOB SERPL: 1.6 G/DL
ALP SERPL-CCNC: 62 U/L (ref 39–117)
ALT SERPL W P-5'-P-CCNC: 12 U/L (ref 1–33)
ANION GAP SERPL CALCULATED.3IONS-SCNC: 12 MMOL/L (ref 5–15)
AST SERPL-CCNC: 15 U/L (ref 1–32)
BACTERIA UR QL AUTO: ABNORMAL /HPF
BASOPHILS # BLD AUTO: 0.02 10*3/MM3 (ref 0–0.2)
BASOPHILS NFR BLD AUTO: 0.2 % (ref 0–1.5)
BILIRUB SERPL-MCNC: 0.2 MG/DL (ref 0–1.2)
BILIRUB UR QL STRIP: NEGATIVE
BUN SERPL-MCNC: 11 MG/DL (ref 6–20)
BUN/CREAT SERPL: 19.6 (ref 7–25)
CALCIUM SPEC-SCNC: 8.8 MG/DL (ref 8.6–10.5)
CHLORIDE SERPL-SCNC: 105 MMOL/L (ref 98–107)
CLARITY UR: ABNORMAL
CO2 SERPL-SCNC: 21 MMOL/L (ref 22–29)
COLOR UR: YELLOW
CREAT SERPL-MCNC: 0.56 MG/DL (ref 0.57–1)
DEPRECATED RDW RBC AUTO: 37.2 FL (ref 37–54)
EGFRCR SERPLBLD CKD-EPI 2021: 132.5 ML/MIN/1.73
EOSINOPHIL # BLD AUTO: 0.1 10*3/MM3 (ref 0–0.4)
EOSINOPHIL NFR BLD AUTO: 1.1 % (ref 0.3–6.2)
ERYTHROCYTE [DISTWIDTH] IN BLOOD BY AUTOMATED COUNT: 12.1 % (ref 12.3–15.4)
GLOBULIN UR ELPH-MCNC: 2.5 GM/DL
GLUCOSE SERPL-MCNC: 95 MG/DL (ref 65–99)
GLUCOSE UR STRIP-MCNC: NEGATIVE MG/DL
HCG INTACT+B SERPL-ACNC: NORMAL MIU/ML
HCT VFR BLD AUTO: 39.9 % (ref 34–46.6)
HGB BLD-MCNC: 13.7 G/DL (ref 12–15.9)
HGB UR QL STRIP.AUTO: NEGATIVE
HYALINE CASTS UR QL AUTO: ABNORMAL /LPF
IMM GRANULOCYTES # BLD AUTO: 0.02 10*3/MM3 (ref 0–0.05)
IMM GRANULOCYTES NFR BLD AUTO: 0.2 % (ref 0–0.5)
KETONES UR QL STRIP: NEGATIVE
LEUKOCYTE ESTERASE UR QL STRIP.AUTO: ABNORMAL
LIPASE SERPL-CCNC: 36 U/L (ref 13–60)
LYMPHOCYTES # BLD AUTO: 3.55 10*3/MM3 (ref 0.7–3.1)
LYMPHOCYTES NFR BLD AUTO: 40.5 % (ref 19.6–45.3)
MCH RBC QN AUTO: 29.3 PG (ref 26.6–33)
MCHC RBC AUTO-ENTMCNC: 34.3 G/DL (ref 31.5–35.7)
MCV RBC AUTO: 85.3 FL (ref 79–97)
MONOCYTES # BLD AUTO: 0.62 10*3/MM3 (ref 0.1–0.9)
MONOCYTES NFR BLD AUTO: 7.1 % (ref 5–12)
NEUTROPHILS NFR BLD AUTO: 4.46 10*3/MM3 (ref 1.7–7)
NEUTROPHILS NFR BLD AUTO: 50.9 % (ref 42.7–76)
NITRITE UR QL STRIP: NEGATIVE
NRBC BLD AUTO-RTO: 0 /100 WBC (ref 0–0.2)
PH UR STRIP.AUTO: 7 [PH] (ref 5–8)
PLATELET # BLD AUTO: 242 10*3/MM3 (ref 140–450)
PMV BLD AUTO: 10 FL (ref 6–12)
POTASSIUM SERPL-SCNC: 4.1 MMOL/L (ref 3.5–5.2)
PROT SERPL-MCNC: 6.5 G/DL (ref 6–8.5)
PROT UR QL STRIP: NEGATIVE
RBC # BLD AUTO: 4.68 10*6/MM3 (ref 3.77–5.28)
RBC # UR STRIP: ABNORMAL /HPF
REF LAB TEST METHOD: ABNORMAL
SODIUM SERPL-SCNC: 138 MMOL/L (ref 136–145)
SP GR UR STRIP: 1.01 (ref 1–1.03)
SQUAMOUS #/AREA URNS HPF: ABNORMAL /HPF
UROBILINOGEN UR QL STRIP: ABNORMAL
WBC # UR STRIP: ABNORMAL /HPF
WBC NRBC COR # BLD AUTO: 8.77 10*3/MM3 (ref 3.4–10.8)

## 2024-10-02 PROCEDURE — 81001 URINALYSIS AUTO W/SCOPE: CPT | Performed by: EMERGENCY MEDICINE

## 2024-10-02 PROCEDURE — 84702 CHORIONIC GONADOTROPIN TEST: CPT | Performed by: EMERGENCY MEDICINE

## 2024-10-02 PROCEDURE — 76817 TRANSVAGINAL US OBSTETRIC: CPT

## 2024-10-02 PROCEDURE — 83690 ASSAY OF LIPASE: CPT | Performed by: EMERGENCY MEDICINE

## 2024-10-02 PROCEDURE — 36415 COLL VENOUS BLD VENIPUNCTURE: CPT

## 2024-10-02 PROCEDURE — 85025 COMPLETE CBC W/AUTO DIFF WBC: CPT | Performed by: EMERGENCY MEDICINE

## 2024-10-02 PROCEDURE — 99284 EMERGENCY DEPT VISIT MOD MDM: CPT

## 2024-10-02 PROCEDURE — 80053 COMPREHEN METABOLIC PANEL: CPT | Performed by: EMERGENCY MEDICINE

## 2024-10-02 RX ORDER — ACETAMINOPHEN 500 MG
1000 TABLET ORAL ONCE
Status: COMPLETED | OUTPATIENT
Start: 2024-10-02 | End: 2024-10-02

## 2024-10-02 RX ADMIN — ACETAMINOPHEN 1000 MG: 500 TABLET ORAL at 05:05
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
## 2024-10-29 ENCOUNTER — PATIENT MESSAGE (OUTPATIENT)
Dept: OBSTETRICS AND GYNECOLOGY | Facility: CLINIC | Age: 24
End: 2024-10-29
Payer: MEDICAID

## 2024-10-29 NOTE — TELEPHONE ENCOUNTER
Pt states cramping is located in lower abdomen- right or left side, never centrally located and in her lower back on her right side. Cramping is a little stronger than a period cramp. Cramping is consistent and does not come and go. Pain started as of last night. Reports spotting yesterday only when she wiped. She does not have any today. None of the following: headaches, dizziness, lightheadedness, visual changes, constipation, recent intercourse, nausea, vomiting.

## 2024-10-30 NOTE — TELEPHONE ENCOUNTER
"\"She can take tylenol and if she is having significant pain, then we should get her in for an appointment. She will need to leave urine sample for her appointment so we can check for UTI.\" - Dr Martins    Pain has decreased since yesterday. Only having minor pain on her right side. Cramping pain that comes and goes. Does not feel the pain is strong enough to take Tylenol. No pain with urination. Pain only when up on her feet for long periods of time. She feels it is round ligament pain.     "

## 2024-10-30 NOTE — TELEPHONE ENCOUNTER
"\"Looks like she has an appointment next week. I think she can keep that appointment. If pain is worsening, then she can make an appointment sooner.    Dr. Martins\"  Pt informed and stated understanding and agreement.   "

## 2024-11-04 ENCOUNTER — OFFICE VISIT (OUTPATIENT)
Dept: FAMILY MEDICINE CLINIC | Facility: CLINIC | Age: 24
End: 2024-11-04
Payer: MEDICAID

## 2024-11-04 VITALS
HEIGHT: 65 IN | DIASTOLIC BLOOD PRESSURE: 74 MMHG | SYSTOLIC BLOOD PRESSURE: 119 MMHG | BODY MASS INDEX: 27.12 KG/M2 | OXYGEN SATURATION: 97 % | TEMPERATURE: 98.2 F | WEIGHT: 162.8 LBS | HEART RATE: 106 BPM

## 2024-11-04 DIAGNOSIS — L29.89 PRURITIC ERYTHEMATOUS RASH: Primary | ICD-10-CM

## 2024-11-04 PROCEDURE — 1126F AMNT PAIN NOTED NONE PRSNT: CPT | Performed by: PHYSICIAN ASSISTANT

## 2024-11-04 PROCEDURE — 1159F MED LIST DOCD IN RCRD: CPT | Performed by: PHYSICIAN ASSISTANT

## 2024-11-04 PROCEDURE — 1160F RVW MEDS BY RX/DR IN RCRD: CPT | Performed by: PHYSICIAN ASSISTANT

## 2024-11-04 PROCEDURE — 99213 OFFICE O/P EST LOW 20 MIN: CPT | Performed by: PHYSICIAN ASSISTANT

## 2024-11-04 RX ORDER — BENZOCAINE/MENTHOL 6 MG-10 MG
1 LOZENGE MUCOUS MEMBRANE 3 TIMES DAILY
Qty: 120 G | Refills: 0 | Status: SHIPPED | OUTPATIENT
Start: 2024-11-04

## 2024-11-04 NOTE — PROGRESS NOTES
Follow Up Office Visit      Patient Name: Huyen Interiano  : 2000   MRN: [id number]     Chief Complaint:    Chief Complaint   Patient presents with    Rash     Red, itchy bumps that are worse at night       History of Present Illness  The patient presents for evaluation of an itchy rash/bumps.    She reports the onset of three small, itchy bumps on her abdomen that have since spread across her upper abdomen. This condition began approximately 3 to 4 days ago.  The itching intensifies during the night. She attempted to alleviate the itching with hydrocortisone cream, but it provided only temporary relief and states she only tried it once.  She has not tried any additional over-the-counter medications or home remedies.  She denies any recent changes in her use of detergents, soaps, lotions, or known possible irritants.  Of note, patient is currently 14 weeks pregnant.    She has no additional concerns or complaints at this time and is otherwise doing quite well.        Subjective      I have reviewed and the following portions of the patient's history were updated as appropriate: past family history, past medical history, past social history, past surgical history and problem list.    Medications:     Current Outpatient Medications:     Prenatal Vit-Fe Fumarate-FA (PRENATAL VITAMINS PO), Take 1 tablet by mouth Daily., Disp: , Rfl:     hydrocortisone 1 % cream, Apply 1 Application topically to the appropriate area as directed 3 (Three) Times a Day., Disp: 120 g, Rfl: 0    Allergies:   Allergies   Allergen Reactions    Penicillins Hives    Lovenox [Enoxaparin Sodium] Other (See Comments)     Numbness in mouth when eating, felt like throat started to close up.       Objective     Physical Exam:   Physical Exam  Constitutional:       General: She is not in acute distress.     Appearance: She is not ill-appearing.   HENT:      Head: Normocephalic.   Cardiovascular:      Rate and Rhythm: Normal rate and  "regular rhythm.      Heart sounds: No murmur heard.  Pulmonary:      Effort: Pulmonary effort is normal. No respiratory distress.      Breath sounds: Normal breath sounds.   Musculoskeletal:         General: Normal range of motion.   Skin:     General: Skin is warm.      Findings: Rash (erythematous sporadic papules/patches of upper abdomen and under breasts) present.   Neurological:      General: No focal deficit present.      Mental Status: She is alert.   Psychiatric:         Mood and Affect: Mood normal.         Vital Signs:   Vitals:    11/04/24 1041   BP: 119/74   Pulse: 106  Comment: manual   Temp: 98.2 °F (36.8 °C)   TempSrc: Infrared   SpO2: 97%   Weight: 73.8 kg (162 lb 12.8 oz)   Height: 165.1 cm (65\")   PainSc: 0-No pain     Body mass index is 27.09 kg/m².         Procedures    Assessment / Plan         Assessment and Plan     Diagnoses and all orders for this visit:    1. Pruritic erythematous rash (Primary)  -     hydrocortisone 1 % cream; Apply 1 Application topically to the appropriate area as directed 3 (Three) Times a Day.  Dispense: 120 g; Refill: 0    Exact etiology of rash is unclear, though suspect likely secondary to allergic reaction/dermatitis.  Recommended using Benadryl at night as needed for itching.  Will refrain from using high potency topical steroids due to pregnancy.  Have encouraged patient to continue using topical hydrocortisone 2-3 times daily.  She has been encouraged to contact our office if symptoms persist or worsen.  Return precautions discussed.         Follow Up:   Return for Next scheduled follow up.    Patient or patient representative verbalized consent for the use of Ambient Listening during the visit with  Henrietta Morrissey PA-C for chart documentation. 11/4/2024  10:46 EST    Henrietta Morrissey PA-C    [facility]   "

## 2024-11-05 ENCOUNTER — ROUTINE PRENATAL (OUTPATIENT)
Dept: OBSTETRICS AND GYNECOLOGY | Facility: CLINIC | Age: 24
End: 2024-11-05
Payer: MEDICAID

## 2024-11-05 ENCOUNTER — LAB (OUTPATIENT)
Dept: LAB | Facility: HOSPITAL | Age: 24
End: 2024-11-05
Payer: MEDICAID

## 2024-11-05 VITALS — SYSTOLIC BLOOD PRESSURE: 136 MMHG | DIASTOLIC BLOOD PRESSURE: 82 MMHG | BODY MASS INDEX: 27.12 KG/M2 | WEIGHT: 163 LBS

## 2024-11-05 DIAGNOSIS — Z87.59 HISTORY OF GESTATIONAL HYPERTENSION: ICD-10-CM

## 2024-11-05 DIAGNOSIS — O09.41 SUPERVISION OF HIGH-RISK PREGNANCY WITH GRAND MULTIPARITY IN FIRST TRIMESTER: Primary | ICD-10-CM

## 2024-11-05 DIAGNOSIS — D68.51 HETEROZYGOUS FACTOR V LEIDEN AFFECTING PREGNANCY IN FIRST TRIMESTER, ANTEPARTUM: ICD-10-CM

## 2024-11-05 DIAGNOSIS — O99.111 HETEROZYGOUS FACTOR V LEIDEN AFFECTING PREGNANCY IN FIRST TRIMESTER, ANTEPARTUM: ICD-10-CM

## 2024-11-05 DIAGNOSIS — O09.41 SUPERVISION OF HIGH-RISK PREGNANCY WITH GRAND MULTIPARITY IN FIRST TRIMESTER: ICD-10-CM

## 2024-11-05 DIAGNOSIS — O09.891 SHORT INTERVAL BETWEEN PREGNANCIES AFFECTING PREGNANCY IN FIRST TRIMESTER, ANTEPARTUM: ICD-10-CM

## 2024-11-05 DIAGNOSIS — Z3A.01 LESS THAN 8 WEEKS GESTATION OF PREGNANCY: ICD-10-CM

## 2024-11-05 LAB
ABO GROUP BLD: NORMAL
BASOPHILS # BLD AUTO: 0.02 10*3/MM3 (ref 0–0.2)
BASOPHILS NFR BLD AUTO: 0.2 % (ref 0–1.5)
BLD GP AB SCN SERPL QL: POSITIVE
DEPRECATED RDW RBC AUTO: 40.5 FL (ref 37–54)
EOSINOPHIL # BLD AUTO: 0.09 10*3/MM3 (ref 0–0.4)
EOSINOPHIL NFR BLD AUTO: 1 % (ref 0.3–6.2)
ERYTHROCYTE [DISTWIDTH] IN BLOOD BY AUTOMATED COUNT: 12.8 % (ref 12.3–15.4)
HCT VFR BLD AUTO: 39.2 % (ref 34–46.6)
HGB BLD-MCNC: 13.4 G/DL (ref 12–15.9)
IMM GRANULOCYTES # BLD AUTO: 0.02 10*3/MM3 (ref 0–0.05)
IMM GRANULOCYTES NFR BLD AUTO: 0.2 % (ref 0–0.5)
LYMPHOCYTES # BLD AUTO: 2.2 10*3/MM3 (ref 0.7–3.1)
LYMPHOCYTES NFR BLD AUTO: 24.5 % (ref 19.6–45.3)
MCH RBC QN AUTO: 29.8 PG (ref 26.6–33)
MCHC RBC AUTO-ENTMCNC: 34.2 G/DL (ref 31.5–35.7)
MCV RBC AUTO: 87.3 FL (ref 79–97)
MONOCYTES # BLD AUTO: 0.53 10*3/MM3 (ref 0.1–0.9)
MONOCYTES NFR BLD AUTO: 5.9 % (ref 5–12)
NEUTROPHILS NFR BLD AUTO: 6.13 10*3/MM3 (ref 1.7–7)
NEUTROPHILS NFR BLD AUTO: 68.2 % (ref 42.7–76)
NRBC BLD AUTO-RTO: 0 /100 WBC (ref 0–0.2)
PLATELET # BLD AUTO: 215 10*3/MM3 (ref 140–450)
PMV BLD AUTO: 11.1 FL (ref 6–12)
RBC # BLD AUTO: 4.49 10*6/MM3 (ref 3.77–5.28)
RESIDUAL RHIG DETECTED: NORMAL
RH BLD: NEGATIVE
WBC NRBC COR # BLD AUTO: 8.99 10*3/MM3 (ref 3.4–10.8)

## 2024-11-05 PROCEDURE — 86803 HEPATITIS C AB TEST: CPT

## 2024-11-05 PROCEDURE — 80081 OBSTETRIC PANEL INC HIV TSTG: CPT

## 2024-11-05 PROCEDURE — 86870 RBC ANTIBODY IDENTIFICATION: CPT

## 2024-11-05 NOTE — PROGRESS NOTES
Chief Complaint   Patient presents with    Routine Prenatal Visit       Huyen Interiano is a 24 y.o.  at 14w4d who presents for follow up prenatal visit. She is feeling well. Having some occasional cramping/round ligament pain. Has not had any bleeding since last visit.     Pregnancy is complicated by:  - Short interval pregnancy - delivered 2024  - History of GHTN last pregnancy (2024)  - Factor V Leiden heterozygous   - Anxiety/depression      Lab Review   PAP - NILM     /82   Wt 73.9 kg (163 lb)   LMP 2024   BMI 27.12 kg/m²    Gen: NAD, resting comfortably   Abdomen: soft, nontender  Pelvic: deferred    ASSESSMENT/PLAN     24 y.o. year old     Supervision of high risk pregnancy  Rh negative    Routine prenatal care   - The problem list for pregnancy was updated today  - Prenatal labs not yet completed - needs to do   - Genetic testing reviewed: NIPT ordered   - Continue prenatal vitamin  - Reviewed pregnancy OTC options for round ligament pain   - Prenatal anatomy ultrasound at 20 weeks   - Needs influenza vaccine this pregnancy - declines at this time     Vaginal bleeding in early pregnancy, resolved     - rhogam given in office 10/1 given history of bleeding and Rh negative status      Hx GHTN in last pregnancy    - Stopped baby ASA 81 mg due to heart palpitations      Factor V Leiden heterozygous    - Was on anticoagulation x 6 weeks postpartum in last pregnancy (switched from Lovenox to Xarelto due to allergy). Will plan for xarelto x 6 weeks postpartum if not nursing this postpartum period.    - ED visit  - no PE    - Desires anticoagulation plan that is compatible with breastfeeding. Will refer to hematology for discussion of alternative options due to allergy to Lovenox.     Short interval pregnancy   - Perineum well healed from last delivery   - Discussed birth control options. Will plan for POP in immediate postpartum period to prevent another short  interval pregnancy. Patient is afraid of needles/shots.     Follow up in 4 weeks.     This note was electronically signed.    Valerie Martins MD  Obstetrics and Gynecology  [facility]

## 2024-11-06 LAB
HBV SURFACE AG SERPL QL IA: NORMAL
HCV AB SER QL: NORMAL
HIV 1+2 AB+HIV1 P24 AG SERPL QL IA: NORMAL
RPR SER QL: NORMAL

## 2024-11-07 LAB — RUBV IGG SERPL IA-ACNC: 8.41 INDEX

## 2024-11-11 LAB
Lab: NORMAL
NTRA FETAL FRACTION: NORMAL
NTRA GENDER OF FETUS: NORMAL
NTRA MONOSOMY X AGE-BASED RISK TEXT: NORMAL
NTRA MONOSOMY X RESULT TEXT: NORMAL
NTRA MONOSOMY X RISK SCORE TEXT: NORMAL
NTRA TRIPLOIDY RESULT TEXT: NORMAL
NTRA TRISOMY 13 AGE-BASED RISK TEXT: NORMAL
NTRA TRISOMY 13 RESULT TEXT: NORMAL
NTRA TRISOMY 13 RISK SCORE TEXT: NORMAL
NTRA TRISOMY 18 AGE-BASED RISK TEXT: NORMAL
NTRA TRISOMY 18 RESULT TEXT: NORMAL
NTRA TRISOMY 18 RISK SCORE TEXT: NORMAL
NTRA TRISOMY 21 AGE-BASED RISK TEXT: NORMAL
NTRA TRISOMY 21 RESULT TEXT: NORMAL
NTRA TRISOMY 21 RISK SCORE TEXT: NORMAL

## 2024-11-13 ENCOUNTER — OFFICE VISIT (OUTPATIENT)
Dept: ONCOLOGY | Facility: CLINIC | Age: 24
End: 2024-11-13
Payer: MEDICAID

## 2024-11-13 ENCOUNTER — REFERRAL TRIAGE (OUTPATIENT)
Dept: LABOR AND DELIVERY | Facility: HOSPITAL | Age: 24
End: 2024-11-13
Payer: MEDICAID

## 2024-11-13 ENCOUNTER — TELEPHONE (OUTPATIENT)
Dept: FAMILY MEDICINE CLINIC | Facility: CLINIC | Age: 24
End: 2024-11-13
Payer: MEDICAID

## 2024-11-13 VITALS
HEIGHT: 65 IN | TEMPERATURE: 97.5 F | SYSTOLIC BLOOD PRESSURE: 127 MMHG | WEIGHT: 164 LBS | RESPIRATION RATE: 16 BRPM | BODY MASS INDEX: 27.32 KG/M2 | DIASTOLIC BLOOD PRESSURE: 85 MMHG | OXYGEN SATURATION: 97 % | HEART RATE: 104 BPM

## 2024-11-13 DIAGNOSIS — D68.51 HETEROZYGOUS FACTOR V LEIDEN AFFECTING PREGNANCY IN FIRST TRIMESTER, ANTEPARTUM: Primary | ICD-10-CM

## 2024-11-13 DIAGNOSIS — O99.111 HETEROZYGOUS FACTOR V LEIDEN AFFECTING PREGNANCY IN FIRST TRIMESTER, ANTEPARTUM: Primary | ICD-10-CM
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
## 2024-11-13 NOTE — TELEPHONE ENCOUNTER
Caller: Huyen Interiano    Relationship to patient: Self    Best call back number: [phone]     Chief complaint: HIVES PROGRESSING UP NECK TOWARD FACE    Patient directed to call 911 or go to their nearest emergency room.     Patient verbalized understanding: [x] Yes  [] No  If no, why?

## 2024-11-14 ENCOUNTER — PATIENT OUTREACH (OUTPATIENT)
Dept: LABOR AND DELIVERY | Facility: HOSPITAL | Age: 24
End: 2024-11-14
Payer: MEDICAID

## 2024-11-14 NOTE — OUTREACH NOTE
[facility]  Enrollment    Current Estimated Gestational Age: 15w6d    Questions/Answers      Flowsheet Row Responses   Would like to participate? Yes   Date of Intake Visit 11/19/24          Huyen replied to the [employer] invitation, confirming her interest in the program. Intake interview scheduled for next week. [facility] survey sent with requested response.     Lisa Ash RN  Maternity Nurse Navigator    11/14/2024, 14:34 EST

## 2024-11-19 ENCOUNTER — PATIENT OUTREACH (OUTPATIENT)
Dept: LABOR AND DELIVERY | Facility: HOSPITAL | Age: 24
End: 2024-11-19
Payer: MEDICAID

## 2024-11-19 NOTE — OUTREACH NOTE
[facility]    Called Huyen at our scheduled time for Intake interview. She is awaiting a call from the [facility] office and requests we reschedule for Thursday at 11 am.     Lisa Ash RN  Maternity Nurse Navigator    11/19/2024, 14:19 EST

## 2024-11-21 ENCOUNTER — PATIENT OUTREACH (OUTPATIENT)
Dept: LABOR AND DELIVERY | Facility: HOSPITAL | Age: 24
End: 2024-11-21
Payer: MEDICAID

## 2024-11-21 RX ORDER — BETAMETHASONE DIPROPIONATE 0.5 MG/G
1 CREAM TOPICAL 2 TIMES DAILY
Qty: 15 G | Refills: 2 | Status: SHIPPED | OUTPATIENT
Start: 2024-11-21
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
## 2024-12-04 NOTE — PROGRESS NOTES
A [employer] message has been sent to the patient for [place] [facility]     [EKG obtained to assist in diagnosis and management of assessed problem(s)] : EKG obtained to assist in diagnosis and management of assessed problem(s) [FreeTextEntry1] : 1. Chest Pain: no previous cardiac workup. Non-cardiac. No obstructive disease on CTA Coronary Arteries, 10/11/2024. Patient pending endoscopy next month.   2. CAD: non-obstructive disease on CTA Coronary Arteries, 10/11/2024. Continue atorvastatin 20mg daily.  3. HLD: continue atorvastatin 20mg daily.  I reviewed with patient the echocardiogram and CTA Coronary Arteries.  Office will call with results.  Follow up in 6 months.

## 2024-12-09 ENCOUNTER — TELEPHONE (OUTPATIENT)
Dept: OBSTETRICS AND GYNECOLOGY | Facility: CLINIC | Age: 24
End: 2024-12-09

## 2024-12-11 ENCOUNTER — ROUTINE PRENATAL (OUTPATIENT)
Dept: OBSTETRICS AND GYNECOLOGY | Facility: CLINIC | Age: 24
End: 2024-12-11
Payer: MEDICAID

## 2024-12-11 VITALS — DIASTOLIC BLOOD PRESSURE: 84 MMHG | SYSTOLIC BLOOD PRESSURE: 136 MMHG | BODY MASS INDEX: 27.52 KG/M2 | WEIGHT: 165.4 LBS

## 2024-12-11 DIAGNOSIS — O09.92 HIGH-RISK PREGNANCY IN SECOND TRIMESTER: Primary | ICD-10-CM

## 2024-12-11 DIAGNOSIS — O99.111 HETEROZYGOUS FACTOR V LEIDEN AFFECTING PREGNANCY IN FIRST TRIMESTER, ANTEPARTUM: ICD-10-CM

## 2024-12-11 DIAGNOSIS — O09.891 SHORT INTERVAL BETWEEN PREGNANCIES AFFECTING PREGNANCY IN FIRST TRIMESTER, ANTEPARTUM: ICD-10-CM

## 2024-12-11 DIAGNOSIS — D68.51 HETEROZYGOUS FACTOR V LEIDEN AFFECTING PREGNANCY IN FIRST TRIMESTER, ANTEPARTUM: ICD-10-CM

## 2024-12-11 DIAGNOSIS — Z87.59 HISTORY OF GESTATIONAL HYPERTENSION: ICD-10-CM

## 2024-12-11 RX ORDER — FAMOTIDINE 20 MG/1
20 TABLET, FILM COATED ORAL 2 TIMES DAILY
Qty: 60 TABLET | Refills: 3 | Status: SHIPPED | OUTPATIENT
Start: 2024-12-11 | End: 2025-04-10

## 2024-12-11 NOTE — PROGRESS NOTES
Chief Complaint   Patient presents with    Routine Prenatal Visit     No c/c       Huyen Interiano is a 24 y.o.  at 19w5d who presents for follow up prenatal visit. She is feeling well. Having some occasional cramping/round ligament pain. She also has some sciatic pain. She is having some reflux symptoms.     Pregnancy is complicated by:  - Short interval pregnancy - delivered 2024  - History of GHTN last pregnancy (2024)  - Factor V Leiden heterozygous   - Anxiety/depression      Lab Review   PAP - NILM     /84   Wt 75 kg (165 lb 6.4 oz)   LMP 2024   BMI 27.52 kg/m²    Gen: NAD, resting comfortably   Abdomen: soft, nontender  Pelvic: deferred    ASSESSMENT/PLAN     24 y.o. year old     Supervision of high risk pregnancy  Rh negative    Routine prenatal care   - The problem list for pregnancy was updated today  - Prenatal labs normal  - Genetic testing reviewed: NIPT low risk    - Continue prenatal vitamin  - Reviewed pregnancy OTC options for round ligament pain, sciatic nerve stretches and Pepcid for heartburn   - Prenatal anatomy ultrasound ordered for next visit   - Influenza vaccine in pregnancy - declines at this time     Vaginal bleeding in early pregnancy, resolved     - rhogam given in office 10/1 given history of bleeding and Rh negative status      Hx GHTN in last pregnancy    - Stopped baby ASA 81 mg due to heart palpitations      Factor V Leiden heterozygous    - Was on anticoagulation x 6 weeks postpartum in last pregnancy (switched from Lovenox to Xarelto due to allergy). Will plan for xarelto x 6 weeks postpartum if not nursing this postpartum period.    - ED visit  - no PE    - Hematology consult  - recommended anticoagulation postpartum only if has C/S      Short interval pregnancy   - Perineum well healed from last delivery   - Discussed birth control options. Will plan for POP in immediate postpartum period to prevent another short interval  pregnancy. Patient is afraid of needles/shots.     Follow up in 1-2 weeks.     This note was electronically signed.    Valerie Martins MD  Obstetrics and Gynecology  [facility]

## 2024-12-18 ENCOUNTER — TELEPHONE (OUTPATIENT)
Dept: OBSTETRICS AND GYNECOLOGY | Facility: CLINIC | Age: 24
End: 2024-12-18
Payer: MEDICAID

## 2024-12-18 NOTE — TELEPHONE ENCOUNTER
Patient states that she has pain radiating from her bottom up to her stomach. It started yesterday evening and has not gotten any better.

## 2024-12-18 NOTE — TELEPHONE ENCOUNTER
Pt stated that she is able to have bowel movements and does not have a fever. She says it gets worse when she is sitting or leaning forward a little on her stomach. Pt was told that our recommendation (from [facility]) was to go to labor trevizo to be checked out. Pt expressed understanding.

## 2024-12-26 ENCOUNTER — TELEPHONE (OUTPATIENT)
Dept: OBSTETRICS AND GYNECOLOGY | Facility: CLINIC | Age: 24
End: 2024-12-26
Payer: MEDICAID

## 2024-12-26 NOTE — TELEPHONE ENCOUNTER
Pt informed and stated understanding.  Allergy has been added to her allergy list.      Per Dr. Martins:    She should stop taking Pepcid then and we can add to her allergy list.

## 2024-12-26 NOTE — TELEPHONE ENCOUNTER
Patient called and said she took her famotidine (Pepcid) 20 MG tablet and had an allergic reaction. Patient stated her throat closed up.

## 2025-01-02 ENCOUNTER — TELEPHONE (OUTPATIENT)
Dept: OBSTETRICS AND GYNECOLOGY | Facility: CLINIC | Age: 25
End: 2025-01-02
Payer: MEDICAID

## 2025-01-02 ENCOUNTER — ROUTINE PRENATAL (OUTPATIENT)
Dept: OBSTETRICS AND GYNECOLOGY | Facility: CLINIC | Age: 25
End: 2025-01-02
Payer: MEDICAID

## 2025-01-02 VITALS — DIASTOLIC BLOOD PRESSURE: 72 MMHG | WEIGHT: 166 LBS | SYSTOLIC BLOOD PRESSURE: 116 MMHG | BODY MASS INDEX: 27.62 KG/M2

## 2025-01-02 DIAGNOSIS — O09.92 SUPERVISION OF HIGH RISK PREGNANCY IN SECOND TRIMESTER: ICD-10-CM

## 2025-01-02 DIAGNOSIS — R10.9 ABDOMINAL PAIN AFFECTING PREGNANCY: Primary | ICD-10-CM

## 2025-01-02 DIAGNOSIS — O09.891 SHORT INTERVAL BETWEEN PREGNANCIES AFFECTING PREGNANCY IN FIRST TRIMESTER, ANTEPARTUM: ICD-10-CM

## 2025-01-02 DIAGNOSIS — O26.899 ABDOMINAL PAIN AFFECTING PREGNANCY: Primary | ICD-10-CM

## 2025-01-02 LAB
GLUCOSE UR STRIP-MCNC: NEGATIVE MG/DL
PROT UR STRIP-MCNC: NEGATIVE MG/DL

## 2025-01-02 PROCEDURE — 87086 URINE CULTURE/COLONY COUNT: CPT | Performed by: STUDENT IN AN ORGANIZED HEALTH CARE EDUCATION/TRAINING PROGRAM

## 2025-01-02 RX ORDER — NITROFURANTOIN 25; 75 MG/1; MG/1
100 CAPSULE ORAL 2 TIMES DAILY
Qty: 14 CAPSULE | Refills: 0 | Status: SHIPPED | OUTPATIENT
Start: 2025-01-02

## 2025-01-02 NOTE — PROGRESS NOTES
Chief Complaint   Patient presents with    Routine Prenatal Visit     Left side abdominal pain (back and stomach when she moves, when she is sitting, then only her stomach), pain score of 7/10). Constant, tylenol (5oomg) is not touching the pain.       Huyen Interiano is a 24 y.o.  at 22w6d who presents for follow up prenatal visit. She is having significant lower abdominal pain over the last few days, worse when she is moving around. Pain is in low belly, does not feel like contractions. Pain is constant and feels like round ligament pain she has had before. She does feel tender over her bladder, but has not noticed any pain with urination or burning when she urinates. Has not noticed any increased vaginal discharge, vaginal bleeding. Pain is limiting her ability to care for her 7 month old. She is taking tylenol without any improvement in her symptoms.     She continues to have heartburn -- had an allergic reaction to Pepcid so has not been taking this.     Pregnancy is complicated by:  - Short interval pregnancy - delivered 2024  - History of GHTN last pregnancy (2024)  - Factor V Leiden heterozygous   - Anxiety/depression    Lab Review   PAP - NILM     /72   Wt 75.3 kg (166 lb)   LMP 2024   BMI 27.62 kg/m²    Gen: NAD   Abdomen: soft, mild tenderness to palpation over the bladder. Reports some generalized back pain but no tenderness to palpation or movement with CVA percussion.   Pelvic: normal external female genitalia, increased amount of physiologic discharge, but grossly normal in appearance. Normal appearing cervix. Cervix closed on exam.     ASSESSMENT/PLAN       Abdominal pain in pregnancy    - Will get urine culture today. Oneswab also collected to evaluate for vaginitis    - Cervix closed - no suspicion for  labor at this time    - Given tenderness to palpation over bladder, will plan to empirically treat for UTI with Macrobid x 7 days. No evidence of  pyelonephritis with no CVA tenderness at this time.    - Discussed with patient that she should present to triage for any signs of worsening symptoms or pyleonephritis, including fever, chills, worsening pain, worsening pain in her back.     Routine prenatal care   - Prenatal labs normal  - Genetic testing reviewed: NIPT low risk    - Continue prenatal vitamin  - Prenatal anatomy ultrasound to be done today    - Influenza vaccine in pregnancy - declines at this time  - 28w: GCT, RPR and Tdap   - 36 w: GBS testing      Heartburn   - Will start prilosec for heartburn given her allergic reaction to Pepcid and continued symptoms with tums     Vaginal bleeding in early pregnancy, resolved     - rhogam given in office 10/1 given history of bleeding and Rh negative status    - Rhogam at 28 weeks     Hx GHTN in last pregnancy    - Stopped baby ASA 81 mg due to heart palpitations      Factor V Leiden heterozygous    - Was on anticoagulation x 6 weeks postpartum in last pregnancy (switched from Lovenox to Xarelto due to allergy). Will plan for xarelto x 6 weeks postpartum if not nursing this postpartum period.    - ED visit 9/30 - no PE    - Hematology consult 11/13 - recommended anticoagulation postpartum only if has C/S      Short interval pregnancy   - Will plan for POP in immediate postpartum period to prevent another short interval pregnancy. Patient is afraid of needles/shots.     Follow up in 2 weeks.     This note was electronically signed.    Valerie Martins MD  Obstetrics and Gynecology  [facility]

## 2025-01-02 NOTE — TELEPHONE ENCOUNTER
Patient advised via Mychart to try Tylenol and make appointment or present to triage if pain not improved and is still significant.     Valerie Martins MD  Obstetrics and Gynecology  [facility]

## 2025-01-04 LAB — BACTERIA SPEC AEROBE CULT: NO GROWTH

## 2025-01-10 ENCOUNTER — PATIENT MESSAGE (OUTPATIENT)
Dept: OBSTETRICS AND GYNECOLOGY | Facility: CLINIC | Age: 25
End: 2025-01-10
Payer: MEDICAID

## 2025-01-15 ENCOUNTER — ROUTINE PRENATAL (OUTPATIENT)
Dept: OBSTETRICS AND GYNECOLOGY | Facility: CLINIC | Age: 25
End: 2025-01-15
Payer: MEDICAID

## 2025-01-15 ENCOUNTER — LAB (OUTPATIENT)
Dept: LAB | Facility: HOSPITAL | Age: 25
End: 2025-01-15
Payer: MEDICAID

## 2025-01-15 VITALS — DIASTOLIC BLOOD PRESSURE: 86 MMHG | SYSTOLIC BLOOD PRESSURE: 132 MMHG | WEIGHT: 169.4 LBS | BODY MASS INDEX: 28.19 KG/M2

## 2025-01-15 DIAGNOSIS — O09.92 HIGH-RISK PREGNANCY IN SECOND TRIMESTER: ICD-10-CM

## 2025-01-15 DIAGNOSIS — Z87.59 HISTORY OF GESTATIONAL HYPERTENSION: ICD-10-CM

## 2025-01-15 DIAGNOSIS — D68.51 HETEROZYGOUS FACTOR V LEIDEN AFFECTING PREGNANCY IN FIRST TRIMESTER, ANTEPARTUM: ICD-10-CM

## 2025-01-15 DIAGNOSIS — O99.111 HETEROZYGOUS FACTOR V LEIDEN AFFECTING PREGNANCY IN FIRST TRIMESTER, ANTEPARTUM: ICD-10-CM

## 2025-01-15 DIAGNOSIS — O09.92 SUPERVISION OF HIGH RISK PREGNANCY IN SECOND TRIMESTER: Primary | ICD-10-CM

## 2025-01-15 DIAGNOSIS — R03.0 ELEVATED BP WITHOUT DIAGNOSIS OF HYPERTENSION: Primary | ICD-10-CM

## 2025-01-15 DIAGNOSIS — R03.0 ELEVATED BP WITHOUT DIAGNOSIS OF HYPERTENSION: ICD-10-CM

## 2025-01-15 DIAGNOSIS — O09.891 SHORT INTERVAL BETWEEN PREGNANCIES AFFECTING PREGNANCY IN FIRST TRIMESTER, ANTEPARTUM: ICD-10-CM

## 2025-01-15 LAB
ALP SERPL-CCNC: 74 U/L (ref 39–117)
ALT SERPL W P-5'-P-CCNC: 10 U/L (ref 1–33)
AST SERPL-CCNC: 12 U/L (ref 1–32)
BILIRUB SERPL-MCNC: 0.2 MG/DL (ref 0–1.2)
CREAT SERPL-MCNC: 0.45 MG/DL (ref 0.57–1)
DEPRECATED RDW RBC AUTO: 40 FL (ref 37–54)
ERYTHROCYTE [DISTWIDTH] IN BLOOD BY AUTOMATED COUNT: 12.3 % (ref 12.3–15.4)
GLUCOSE 1H P 100 G GLC PO SERPL-MCNC: 104 MG/DL (ref 65–139)
GLUCOSE UR STRIP-MCNC: NEGATIVE MG/DL
HCT VFR BLD AUTO: 35.6 % (ref 34–46.6)
HGB BLD-MCNC: 12.3 G/DL (ref 12–15.9)
LDH SERPL-CCNC: 158 U/L (ref 135–214)
MCH RBC QN AUTO: 30.7 PG (ref 26.6–33)
MCHC RBC AUTO-ENTMCNC: 34.6 G/DL (ref 31.5–35.7)
MCV RBC AUTO: 88.8 FL (ref 79–97)
PLATELET # BLD AUTO: 170 10*3/MM3 (ref 140–450)
PMV BLD AUTO: 10.7 FL (ref 6–12)
PROT UR STRIP-MCNC: NEGATIVE MG/DL
RBC # BLD AUTO: 4.01 10*6/MM3 (ref 3.77–5.28)
RPR SER QL: NORMAL
URATE SERPL-MCNC: 2.6 MG/DL (ref 2.4–5.7)
WBC NRBC COR # BLD AUTO: 7.69 10*3/MM3 (ref 3.4–10.8)

## 2025-01-15 PROCEDURE — 84075 ASSAY ALKALINE PHOSPHATASE: CPT

## 2025-01-15 PROCEDURE — 82950 GLUCOSE TEST: CPT

## 2025-01-15 PROCEDURE — 36415 COLL VENOUS BLD VENIPUNCTURE: CPT

## 2025-01-15 PROCEDURE — 84550 ASSAY OF BLOOD/URIC ACID: CPT

## 2025-01-15 PROCEDURE — 86592 SYPHILIS TEST NON-TREP QUAL: CPT

## 2025-01-15 PROCEDURE — 84450 TRANSFERASE (AST) (SGOT): CPT

## 2025-01-15 PROCEDURE — 85027 COMPLETE CBC AUTOMATED: CPT

## 2025-01-15 PROCEDURE — 82565 ASSAY OF CREATININE: CPT

## 2025-01-15 PROCEDURE — 82948 REAGENT STRIP/BLOOD GLUCOSE: CPT

## 2025-01-15 PROCEDURE — 84460 ALANINE AMINO (ALT) (SGPT): CPT

## 2025-01-15 PROCEDURE — 82247 BILIRUBIN TOTAL: CPT

## 2025-01-15 PROCEDURE — 83615 LACTATE (LD) (LDH) ENZYME: CPT

## 2025-01-15 RX ORDER — MAGNESIUM OXIDE 400 MG/1
400 TABLET ORAL DAILY
Qty: 30 TABLET | Refills: 1 | Status: SHIPPED | OUTPATIENT
Start: 2025-01-15

## 2025-01-15 NOTE — PROGRESS NOTES
Chief Complaint   Patient presents with    Routine Prenatal Visit     Brought pictures of bp logs, says bp has been up and down, hands have been swelling       Huyen Interiano is a 24 y.o.  at 24w5d who presents for follow up prenatal visit. She sent a message regarding new headaches and mildly elevated BP at time of headaches at home. Has been taking her BP at home and ranges from 115-143/70-90 at home. Has not had persistently elevated mild range BP at home, but has noticed that BP tends to be higher in the evening. Headaches improve with tylenol.     Abdominal pain has improved.     Pregnancy is complicated by:  - Short interval pregnancy - delivered 2024  - History of GHTN last pregnancy (2024)  - Factor V Leiden heterozygous   - Anxiety/depression    Lab Review   PAP - NILM     /86   Wt 76.8 kg (169 lb 6.4 oz)   LMP 2024   BMI 28.19 kg/m²    Gen: NAD   Abdomen: soft, nontender, gravid  Pelvic: deferred    ASSESSMENT/PLAN       Elevated BP at home with history of GHTN in prior pregnancy   - BP today 132/86 but reports some occasional mild range BP at home.   - Will get preE panel today   - Recommend that patient take BP logs and bring to appt in 2 weeks. Discussed that if persistently mild range, we may need to start BP medication.   - Plan for growth US at next visit (in addition to follow up of anatomy)     Abdominal pain in pregnancy, improved   - Urine culture negative, oneswab with transitional shahla but otherwise neg (1/2)     - Discussed belly band/KT tape for pain      Routine prenatal care   - Prenatal labs normal  - Genetic testing reviewed: NIPT low risk    - Continue prenatal vitamin  - Prenatal anatomy ultrasound - incomplete, needs follow up   - Influenza vaccine in pregnancy - declines at this time  - 28w: GCT, RPR - ordered to do at lab today   - 36 w: GBS testing      Heartburn   - Continue prilosec for heartburn (allergic reaction to Pepcid and continued  symptoms with tums)    Vaginal bleeding in early pregnancy, resolved     - rhogam given in office 10/1 given history of bleeding and Rh negative status    - Rhogam at 28 weeks     Hx GHTN in last pregnancy    - Stopped baby ASA 81 mg due to heart palpitations      Factor V Leiden heterozygous    - Was on anticoagulation x 6 weeks postpartum in last pregnancy (switched from Lovenox to Xarelto due to allergy). Will plan for xarelto x 6 weeks postpartum if not nursing this postpartum period.    - ED visit 9/30 - no PE    - Hematology consult 11/13 - recommended anticoagulation postpartum only if has C/S      Short interval pregnancy   - Will plan for POP in immediate postpartum period to prevent another short interval pregnancy. Patient is afraid of needles/shots.   - She is also potentially interested in sterilization.     Follow up in 2 weeks.     This note was electronically signed.    Valerie Martins MD  Obstetrics and Gynecology  [facility]

## 2025-01-27 ENCOUNTER — ROUTINE PRENATAL (OUTPATIENT)
Dept: OBSTETRICS AND GYNECOLOGY | Facility: CLINIC | Age: 25
End: 2025-01-27
Payer: MEDICAID

## 2025-01-27 VITALS — DIASTOLIC BLOOD PRESSURE: 82 MMHG | BODY MASS INDEX: 28.09 KG/M2 | WEIGHT: 168.8 LBS | SYSTOLIC BLOOD PRESSURE: 130 MMHG

## 2025-01-27 DIAGNOSIS — O99.111 HETEROZYGOUS FACTOR V LEIDEN AFFECTING PREGNANCY IN FIRST TRIMESTER, ANTEPARTUM: ICD-10-CM

## 2025-01-27 DIAGNOSIS — R03.0 ELEVATED BP WITHOUT DIAGNOSIS OF HYPERTENSION: ICD-10-CM

## 2025-01-27 DIAGNOSIS — O09.92 SUPERVISION OF HIGH RISK PREGNANCY IN SECOND TRIMESTER: ICD-10-CM

## 2025-01-27 DIAGNOSIS — D68.51 HETEROZYGOUS FACTOR V LEIDEN AFFECTING PREGNANCY IN FIRST TRIMESTER, ANTEPARTUM: ICD-10-CM

## 2025-01-27 DIAGNOSIS — O36.5990 FETAL GROWTH RESTRICTION ANTEPARTUM: Primary | ICD-10-CM

## 2025-01-27 NOTE — PROGRESS NOTES
Chief Complaint   Patient presents with    Routine Prenatal Visit     US done today / no c/c       Huyen Interiano is a 24 y.o.  at 26w3d who presents for follow up prenatal visit. She is feeling okay overall. She has pain in her pubic bone and left hip, slightly improved with KT tape and stretches. She has been taking her BP at home -- logs 115-140/70-90 with most BP in 120s/80s. No more headaches, chest pain, shortness of breath.     Pregnancy is complicated by:  - Short interval pregnancy - delivered 2024  - History of GHTN last pregnancy (2024) - heart palpitations with ASA   - Factor V Leiden heterozygous   - Anxiety/depression    /82   Wt 76.6 kg (168 lb 12.8 oz)   LMP 2024   BMI 28.09 kg/m²    Gen: NAD   Abdomen: soft, nontender, gravid. Tenderness to palpation of pubic bone   Pelvic: deferred    ASSESSMENT/PLAN       Elevated BP at home with history of GHTN in prior pregnancy   - BP today 130/82  but reports some occasional mild range BP at home. Will continue to monitor with BP logs at this time.   - PreE panel 1/15 normal   - Discussed that with new FGR seen, I would recommend that we plan to treat like GHTN with growth scans and  testing.     Fetal growth restriction by AC 5th percentile  - Will plan for PDC ultrasound for follow up ultrasound as well as assessment of fetal Dopplers      Routine prenatal care   - Prenatal labs normal  - Genetic testing reviewed: NIPT low risk    - Continue prenatal vitamin  - Prenatal anatomy ultrasound - completed today   - Influenza vaccine in pregnancy - declines at this time  - 28w: GCT, RPR - normal.   - 36 w: GBS testing      Heartburn   - Continue prilosec for heartburn (allergic reaction to Pepcid and continued symptoms with tums)    Vaginal bleeding in early pregnancy, resolved     - rhogam given in office 10/1 given history of bleeding and Rh negative status    - Rhogam at 28 weeks - next visit      Hx GHTN in last  pregnancy    - Stopped baby ASA 81 mg due to heart palpitations      Factor V Leiden heterozygous    - Was on anticoagulation x 6 weeks postpartum in last pregnancy (switched from Lovenox to Xarelto due to allergy). Will plan for xarelto x 6 weeks postpartum if not nursing this postpartum period.    - ED visit 9/30 - no PE    - Hematology consult 11/13 - recommended anticoagulation postpartum only if has C/S      Short interval pregnancy   - Will plan for POP in immediate postpartum period to prevent another short interval pregnancy. Patient is afraid of needles/shots.   - She is also potentially interested in sterilization.     9. Pubic symphysis pain    - Discussed tylenol, KT tape for pain   - Discussed other option of physical therapy - patient would like to hold off at this time     Follow up in 2 weeks. Needs PDC ultrasound for AC <10th percentile (ordered)     This note was electronically signed.    Valerie Martins MD  Obstetrics and Gynecology  [facility]

## 2025-01-29 ENCOUNTER — PATIENT MESSAGE (OUTPATIENT)
Dept: OBSTETRICS AND GYNECOLOGY | Facility: CLINIC | Age: 25
End: 2025-01-29
Payer: MEDICAID

## 2025-01-29 NOTE — TELEPHONE ENCOUNTER
Pt reports that she is dizzy and out of breath when standing more than five to ten minutes. She is drinking about 7-8 water bottles of water per day. Reports edema in hands in morning, has headaches on and off that relieved with tylenol.     Pain in hip is constant. Pain in abdomen is when she is on her feet for too long, over ten minutes. Feels like a sharp, shooting pain over her entire abdomen. Resting helps pain but as soon as she resumes standing, pain will return. She has been having regular bowel movements. Reports nausea without vomiting. Checked recent BP this morning 132/98. States that she normally has varying blood pressure     Denies any bleeding, fluid leakage, chest pain

## 2025-01-30 ENCOUNTER — ROUTINE PRENATAL (OUTPATIENT)
Dept: OBSTETRICS AND GYNECOLOGY | Facility: CLINIC | Age: 25
End: 2025-01-30
Payer: MEDICAID

## 2025-01-30 ENCOUNTER — TELEPHONE (OUTPATIENT)
Dept: OBSTETRICS AND GYNECOLOGY | Facility: CLINIC | Age: 25
End: 2025-01-30
Payer: MEDICAID

## 2025-01-30 ENCOUNTER — LAB (OUTPATIENT)
Dept: LAB | Facility: HOSPITAL | Age: 25
End: 2025-01-30
Payer: MEDICAID

## 2025-01-30 VITALS — BODY MASS INDEX: 28.22 KG/M2 | DIASTOLIC BLOOD PRESSURE: 92 MMHG | WEIGHT: 169.6 LBS | SYSTOLIC BLOOD PRESSURE: 138 MMHG

## 2025-01-30 DIAGNOSIS — R03.0 ELEVATED BP WITHOUT DIAGNOSIS OF HYPERTENSION: ICD-10-CM

## 2025-01-30 DIAGNOSIS — R03.0 ELEVATED BP WITHOUT DIAGNOSIS OF HYPERTENSION: Primary | ICD-10-CM

## 2025-01-30 DIAGNOSIS — O36.5990 FETAL GROWTH RESTRICTION ANTEPARTUM: ICD-10-CM

## 2025-01-30 DIAGNOSIS — O09.92 SUPERVISION OF HIGH RISK PREGNANCY IN SECOND TRIMESTER: ICD-10-CM

## 2025-01-30 LAB
ALP SERPL-CCNC: 103 U/L (ref 39–117)
ALT SERPL W P-5'-P-CCNC: 8 U/L (ref 1–33)
AST SERPL-CCNC: 15 U/L (ref 1–32)
BILIRUB SERPL-MCNC: <0.2 MG/DL (ref 0–1.2)
CREAT SERPL-MCNC: 0.54 MG/DL (ref 0.57–1)
LDH SERPL-CCNC: 223 U/L (ref 135–214)
URATE SERPL-MCNC: 3.1 MG/DL (ref 2.4–5.7)

## 2025-01-30 PROCEDURE — 84550 ASSAY OF BLOOD/URIC ACID: CPT

## 2025-01-30 PROCEDURE — 82247 BILIRUBIN TOTAL: CPT

## 2025-01-30 PROCEDURE — 36415 COLL VENOUS BLD VENIPUNCTURE: CPT

## 2025-01-30 PROCEDURE — 84460 ALANINE AMINO (ALT) (SGPT): CPT

## 2025-01-30 PROCEDURE — 83615 LACTATE (LD) (LDH) ENZYME: CPT

## 2025-01-30 PROCEDURE — 84450 TRANSFERASE (AST) (SGOT): CPT

## 2025-01-30 PROCEDURE — 82565 ASSAY OF CREATININE: CPT

## 2025-01-30 PROCEDURE — 84075 ASSAY ALKALINE PHOSPHATASE: CPT

## 2025-01-30 NOTE — PROGRESS NOTES
Chief Complaint   Patient presents with    Routine Prenatal Visit     BP has been going higher and then back down. Has been getting out of breath and hands have been swelling (equal swelling, no headaches).       Huyen Interiano is a 24 y.o.  at 26w6d who presents for follow up prenatal visit. She called in today due to elevated BP at home. On her BP cuff at home, BP has been measuring 120-150/100s but she thinks that machine is unreliable as numbers are always very close together. She is still having pain in pubic bone and leg, but no more contraction like pain. She notes that her hands are more swollen in morning, but goes away by the afternoon. No headaches. Has had some shortness of breath, but no significant change.     Pregnancy is complicated by:  - Short interval pregnancy - delivered 2024  - History of GHTN last pregnancy (2024) - heart palpitations with ASA   - Factor V Leiden heterozygous   - Anxiety/depression    /92   Wt 76.9 kg (169 lb 9.6 oz)   LMP 2024   BMI 28.22 kg/m²    Gen: NAD   Abdomen: soft, nontender, gravid  Pelvic: deferred    ASSESSMENT/PLAN       Elevated BP x 1 in office with history of GHTN in prior pregnancy   - BP today 138/92 today (first MR BP in office). Checked BP cuff from home -- measurement was 120/110 with BP cuff from home at same time that office cuff was 138/92. Recommend new BP cuff for monitoring at home.    - PreE panel 1/15 normal. Will repeat preE panel today   - Weekly office visits     Fetal growth restriction by AC 5th percentile  - Will plan for PDC ultrasound for follow up ultrasound as well as assessment of fetal Dopplers - scheduled       Routine prenatal care   - Prenatal labs normal  - Genetic testing reviewed: NIPT low risk    - Continue prenatal vitamin  - Prenatal anatomy ultrasound - completed, normal   - Influenza vaccine in pregnancy - declines  - 28w: GCT, RPR - normal.   - 36 w: GBS testing      Heartburn   -  Continue prilosec for heartburn (allergic reaction to Pepcid and continued symptoms with tums)    Vaginal bleeding in early pregnancy, resolved     - rhogam given in office 10/1 given history of bleeding and Rh negative status    - Rhogam at 28 weeks - next visit      Hx GHTN in last pregnancy    - Stopped baby ASA 81 mg due to heart palpitations      Factor V Leiden heterozygous    - Was on anticoagulation x 6 weeks postpartum in last pregnancy (switched from Lovenox to Xarelto due to allergy). Will plan for xarelto x 6 weeks postpartum if not nursing this postpartum period.    - ED visit 9/30 - no PE    - Hematology consult 11/13 - recommended anticoagulation postpartum only if has C/S      Short interval pregnancy   - Will plan for POP in immediate postpartum period to prevent another short interval pregnancy. Patient is afraid of needles/shots.   - She is also potentially interested in sterilization.     9. Pubic symphysis pain    - Discussed tylenol, KT tape for pain   - Discussed other option of physical therapy - patient would like to hold off at this time     Follow up in 1 week    This note was electronically signed.    Valerie Martins MD  Obstetrics and Gynecology  [facility]

## 2025-01-30 NOTE — TELEPHONE ENCOUNTER
Called pt back and got her scheduled to see Dr. Martins today @ 10:30. Pt was told to bring her BP cuff to see if it was accurate. Pt expressed understanding.

## 2025-01-30 NOTE — TELEPHONE ENCOUNTER
She should try to get appointment today or tomorrow morning and I would like her to bring in BP cuff to office. If she has any headache, chest pain, shortness of breath, contractions, then I would like her to go to triage if she cannot come in today.     Valerie Martins MD  Obstetrics and Gynecology  [facility]

## 2025-01-30 NOTE — TELEPHONE ENCOUNTER
Pt called stating that she has been monitoring her bp and said she first had an reading of 154/97 and then waited 10 mins and the second reading was 122/102. Also stated that she doesn't know if she's getting accurate readings but wants to know what should she do

## 2025-02-02 ENCOUNTER — HOSPITAL ENCOUNTER (INPATIENT)
Facility: HOSPITAL | Age: 25
LOS: 2 days | Discharge: HOME | DRG: 832 | End: 2025-02-06
Attending: STUDENT IN AN ORGANIZED HEALTH CARE EDUCATION/TRAINING PROGRAM | Admitting: STUDENT IN AN ORGANIZED HEALTH CARE EDUCATION/TRAINING PROGRAM
Payer: MEDICAID

## 2025-02-02 PROBLEM — Z34.92 SECOND TRIMESTER PREGNANCY: Status: ACTIVE | Noted: 2025-02-02

## 2025-02-02 LAB
ALP SERPL-CCNC: 79 U/L (ref 39–117)
ALT SERPL W P-5'-P-CCNC: 8 U/L (ref 1–33)
AST SERPL-CCNC: 13 U/L (ref 1–32)
BACTERIA UR QL AUTO: ABNORMAL /HPF
BILIRUB SERPL-MCNC: 0.2 MG/DL (ref 0–1.2)
BILIRUB UR QL STRIP: NEGATIVE
CLARITY UR: ABNORMAL
COLOR UR: YELLOW
CREAT SERPL-MCNC: 0.55 MG/DL (ref 0.57–1)
DEPRECATED RDW RBC AUTO: 40.1 FL (ref 37–54)
ERYTHROCYTE [DISTWIDTH] IN BLOOD BY AUTOMATED COUNT: 13 % (ref 12.3–15.4)
GLUCOSE UR STRIP-MCNC: NEGATIVE MG/DL
HCT VFR BLD AUTO: 33.6 % (ref 34–46.6)
HGB BLD-MCNC: 11.8 G/DL (ref 12–15.9)
HGB UR QL STRIP.AUTO: NEGATIVE
HYALINE CASTS UR QL AUTO: ABNORMAL /LPF
KETONES UR QL STRIP: NEGATIVE
LDH SERPL-CCNC: 174 U/L (ref 135–214)
LEUKOCYTE ESTERASE UR QL STRIP.AUTO: ABNORMAL
MCH RBC QN AUTO: 30.4 PG (ref 26.6–33)
MCHC RBC AUTO-ENTMCNC: 35.1 G/DL (ref 31.5–35.7)
MCV RBC AUTO: 86.6 FL (ref 79–97)
NITRITE UR QL STRIP: NEGATIVE
PH UR STRIP.AUTO: 8 [PH] (ref 5–8)
PLATELET # BLD AUTO: 219 10*3/MM3 (ref 140–450)
PMV BLD AUTO: 10.4 FL (ref 6–12)
PROT UR QL STRIP: NEGATIVE
RBC # BLD AUTO: 3.88 10*6/MM3 (ref 3.77–5.28)
RBC # UR STRIP: ABNORMAL /HPF
REF LAB TEST METHOD: ABNORMAL
SP GR UR STRIP: 1.02 (ref 1–1.03)
SQUAMOUS #/AREA URNS HPF: ABNORMAL /HPF
URATE SERPL-MCNC: 3.9 MG/DL (ref 2.4–5.7)
UROBILINOGEN UR QL STRIP: ABNORMAL
WBC # UR STRIP: ABNORMAL /HPF
WBC NRBC COR # BLD AUTO: 11.94 10*3/MM3 (ref 3.4–10.8)

## 2025-02-02 PROCEDURE — 84450 TRANSFERASE (AST) (SGOT): CPT | Performed by: OBSTETRICS & GYNECOLOGY

## 2025-02-02 PROCEDURE — G0378 HOSPITAL OBSERVATION PER HR: HCPCS

## 2025-02-02 PROCEDURE — 99221 1ST HOSP IP/OBS SF/LOW 40: CPT | Performed by: OBSTETRICS & GYNECOLOGY

## 2025-02-02 PROCEDURE — 84550 ASSAY OF BLOOD/URIC ACID: CPT | Performed by: OBSTETRICS & GYNECOLOGY

## 2025-02-02 PROCEDURE — 82565 ASSAY OF CREATININE: CPT | Performed by: OBSTETRICS & GYNECOLOGY

## 2025-02-02 PROCEDURE — 59025 FETAL NON-STRESS TEST: CPT | Performed by: OBSTETRICS & GYNECOLOGY

## 2025-02-02 PROCEDURE — 83615 LACTATE (LD) (LDH) ENZYME: CPT | Performed by: OBSTETRICS & GYNECOLOGY

## 2025-02-02 PROCEDURE — 84156 ASSAY OF PROTEIN URINE: CPT | Performed by: OBSTETRICS & GYNECOLOGY

## 2025-02-02 PROCEDURE — 81001 URINALYSIS AUTO W/SCOPE: CPT | Performed by: OBSTETRICS & GYNECOLOGY

## 2025-02-02 PROCEDURE — 59025 FETAL NON-STRESS TEST: CPT

## 2025-02-02 PROCEDURE — 84075 ASSAY ALKALINE PHOSPHATASE: CPT | Performed by: OBSTETRICS & GYNECOLOGY

## 2025-02-02 PROCEDURE — 82247 BILIRUBIN TOTAL: CPT | Performed by: OBSTETRICS & GYNECOLOGY

## 2025-02-02 PROCEDURE — 85027 COMPLETE CBC AUTOMATED: CPT | Performed by: OBSTETRICS & GYNECOLOGY

## 2025-02-02 PROCEDURE — 82570 ASSAY OF URINE CREATININE: CPT | Performed by: OBSTETRICS & GYNECOLOGY

## 2025-02-02 PROCEDURE — 87086 URINE CULTURE/COLONY COUNT: CPT | Performed by: OBSTETRICS & GYNECOLOGY

## 2025-02-02 PROCEDURE — 84460 ALANINE AMINO (ALT) (SGPT): CPT | Performed by: OBSTETRICS & GYNECOLOGY
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
## 2025-02-03 ENCOUNTER — APPOINTMENT (OUTPATIENT)
Dept: WOMENS IMAGING | Facility: HOSPITAL | Age: 25
DRG: 832 | End: 2025-02-03
Payer: MEDICAID

## 2025-02-03 ENCOUNTER — APPOINTMENT (OUTPATIENT)
Dept: CARDIOLOGY | Facility: HOSPITAL | Age: 25
DRG: 832 | End: 2025-02-03
Payer: MEDICAID

## 2025-02-03 PROBLEM — O13.9 GESTATIONAL HYPERTENSION: Status: ACTIVE | Noted: 2025-02-03

## 2025-02-03 LAB
BH CV ECHO MEAS - AO ROOT DIAM: 2.7 CM
BH CV ECHO MEAS - EDV(CUBED): 50.7 ML
BH CV ECHO MEAS - EDV(MOD-SP2): 155 ML
BH CV ECHO MEAS - EDV(MOD-SP4): 128 ML
BH CV ECHO MEAS - EF(MOD-SP2): 59.5 %
BH CV ECHO MEAS - EF(MOD-SP4): 52.7 %
BH CV ECHO MEAS - ESV(CUBED): 9.3 ML
BH CV ECHO MEAS - ESV(MOD-SP2): 62.8 ML
BH CV ECHO MEAS - ESV(MOD-SP4): 60.5 ML
BH CV ECHO MEAS - FS: 43.2 %
BH CV ECHO MEAS - IVS/LVPW: 1 CM
BH CV ECHO MEAS - IVSD: 0.9 CM
BH CV ECHO MEAS - LA DIMENSION: 2.8 CM
BH CV ECHO MEAS - LAT PEAK E' VEL: 8.8 CM/SEC
BH CV ECHO MEAS - LV DIASTOLIC VOL/BSA (35-75): 70.3 CM2
BH CV ECHO MEAS - LV MASS(C)D: 96.9 GRAMS
BH CV ECHO MEAS - LV MAX PG: 3.9 MMHG
BH CV ECHO MEAS - LV MEAN PG: 2 MMHG
BH CV ECHO MEAS - LV SYSTOLIC VOL/BSA (12-30): 33.2 CM2
BH CV ECHO MEAS - LV V1 MAX: 98.1 CM/SEC
BH CV ECHO MEAS - LV V1 VTI: 16.1 CM
BH CV ECHO MEAS - LVIDD: 3.7 CM
BH CV ECHO MEAS - LVIDS: 2.1 CM
BH CV ECHO MEAS - LVOT AREA: 3.1 CM2
BH CV ECHO MEAS - LVOT DIAM: 2 CM
BH CV ECHO MEAS - LVPWD: 0.9 CM
BH CV ECHO MEAS - MED PEAK E' VEL: 12.3 CM/SEC
BH CV ECHO MEAS - MV A MAX VEL: 84.9 CM/SEC
BH CV ECHO MEAS - MV DEC SLOPE: 426.3 CM/SEC2
BH CV ECHO MEAS - MV DEC TIME: 0.46 SEC
BH CV ECHO MEAS - MV E MAX VEL: 98.8 CM/SEC
BH CV ECHO MEAS - MV E/A: 1.16
BH CV ECHO MEAS - MV MAX PG: 5 MMHG
BH CV ECHO MEAS - MV MEAN PG: 2 MMHG
BH CV ECHO MEAS - MV P1/2T: 78.3 MSEC
BH CV ECHO MEAS - MV V2 VTI: 18.3 CM
BH CV ECHO MEAS - MVA(P1/2T): 2.8 CM2
BH CV ECHO MEAS - MVA(VTI): 2.8 CM2
BH CV ECHO MEAS - PA ACC TIME: 0.13 SEC
BH CV ECHO MEAS - SV(LVOT): 50.4 ML
BH CV ECHO MEAS - SV(MOD-SP2): 92.2 ML
BH CV ECHO MEAS - SV(MOD-SP4): 67.5 ML
BH CV ECHO MEAS - SVI(LVOT): 27.7 ML/M2
BH CV ECHO MEAS - SVI(MOD-SP2): 50.6 ML/M2
BH CV ECHO MEAS - SVI(MOD-SP4): 37.1 ML/M2
BH CV ECHO MEAS - TAPSE (>1.6): 1.39 CM
BH CV ECHO MEASUREMENTS AVERAGE E/E' RATIO: 9.36
BH CV XLRA - RV BASE: 4.7 CM
BH CV XLRA - RV LENGTH: 6.6 CM
BH CV XLRA - RV MID: 3.1 CM
BH CV XLRA - TDI S': 18.2 CM/SEC
COLLECT DURATION TIME UR: 24 HRS
CREAT UR-MCNC: 80.5 MG/DL
LEFT ATRIUM VOLUME INDEX: 25.9 ML/M2
PROT 24H UR-MRATE: 156 MG/24HOURS (ref 0–150)
PROT ?TM UR-MCNC: 12.7 MG/DL
PROT/CREAT UR: 157.8 MG/G CREA (ref 0–200)
SPECIMEN VOL 24H UR: 2400 ML

## 2025-02-03 PROCEDURE — G0378 HOSPITAL OBSERVATION PER HR: HCPCS

## 2025-02-03 PROCEDURE — 59025 FETAL NON-STRESS TEST: CPT

## 2025-02-03 PROCEDURE — 76811 OB US DETAILED SNGL FETUS: CPT | Performed by: OBSTETRICS & GYNECOLOGY

## 2025-02-03 PROCEDURE — 93306 TTE W/DOPPLER COMPLETE: CPT | Performed by: INTERNAL MEDICINE

## 2025-02-03 PROCEDURE — 99254 IP/OBS CNSLTJ NEW/EST MOD 60: CPT | Performed by: INTERNAL MEDICINE

## 2025-02-03 PROCEDURE — 76811 OB US DETAILED SNGL FETUS: CPT

## 2025-02-03 PROCEDURE — 25010000002 BETAMETHASONE ACET & SOD PHOS PER 4 MG: Performed by: OBSTETRICS & GYNECOLOGY

## 2025-02-03 PROCEDURE — 25810000003 LACTATED RINGERS PER 1000 ML: Performed by: OBSTETRICS & GYNECOLOGY

## 2025-02-03 PROCEDURE — 81050 URINALYSIS VOLUME MEASURE: CPT | Performed by: OBSTETRICS & GYNECOLOGY

## 2025-02-03 PROCEDURE — 76820 UMBILICAL ARTERY ECHO: CPT | Performed by: OBSTETRICS & GYNECOLOGY

## 2025-02-03 PROCEDURE — 93010 ELECTROCARDIOGRAM REPORT: CPT | Performed by: INTERNAL MEDICINE

## 2025-02-03 PROCEDURE — 93306 TTE W/DOPPLER COMPLETE: CPT

## 2025-02-03 PROCEDURE — 93005 ELECTROCARDIOGRAM TRACING: CPT | Performed by: OBSTETRICS & GYNECOLOGY

## 2025-02-03 PROCEDURE — 76820 UMBILICAL ARTERY ECHO: CPT

## 2025-02-03 PROCEDURE — 84156 ASSAY OF PROTEIN URINE: CPT | Performed by: OBSTETRICS & GYNECOLOGY

## 2025-02-03 RX ORDER — LABETALOL 100 MG/1
100 TABLET, FILM COATED ORAL EVERY 12 HOURS SCHEDULED
Status: CANCELLED | OUTPATIENT
Start: 2025-02-03

## 2025-02-03 RX ORDER — SODIUM CHLORIDE, SODIUM LACTATE, POTASSIUM CHLORIDE, CALCIUM CHLORIDE 600; 310; 30; 20 MG/100ML; MG/100ML; MG/100ML; MG/100ML
75 INJECTION, SOLUTION INTRAVENOUS CONTINUOUS
Status: ACTIVE | OUTPATIENT
Start: 2025-02-03 | End: 2025-02-05

## 2025-02-03 RX ORDER — LABETALOL 100 MG/1
100 TABLET, FILM COATED ORAL EVERY 8 HOURS SCHEDULED
Status: DISCONTINUED | OUTPATIENT
Start: 2025-02-03 | End: 2025-02-06 | Stop reason: HOSPADM

## 2025-02-03 RX ORDER — ACETAMINOPHEN 325 MG/1
650 TABLET ORAL EVERY 6 HOURS PRN
Status: DISCONTINUED | OUTPATIENT
Start: 2025-02-03 | End: 2025-02-06 | Stop reason: HOSPADM

## 2025-02-03 RX ORDER — CALCIUM CARBONATE 500 MG/1
2 TABLET, CHEWABLE ORAL 3 TIMES DAILY PRN
Status: DISCONTINUED | OUTPATIENT
Start: 2025-02-03 | End: 2025-02-06 | Stop reason: HOSPADM

## 2025-02-03 RX ORDER — BETAMETHASONE SODIUM PHOSPHATE AND BETAMETHASONE ACETATE 3; 3 MG/ML; MG/ML
12 INJECTION, SUSPENSION INTRA-ARTICULAR; INTRALESIONAL; INTRAMUSCULAR; SOFT TISSUE EVERY 24 HOURS
Status: COMPLETED | OUTPATIENT
Start: 2025-02-03 | End: 2025-02-04

## 2025-02-03 RX ORDER — NITROFURANTOIN 25; 75 MG/1; MG/1
100 CAPSULE ORAL EVERY 12 HOURS SCHEDULED
Status: DISCONTINUED | OUTPATIENT
Start: 2025-02-03 | End: 2025-02-06 | Stop reason: HOSPADM

## 2025-02-03 RX ADMIN — BETAMETHASONE SODIUM PHOSPHATE AND BETAMETHASONE ACETATE 12 MG: 3; 3 INJECTION, SUSPENSION INTRA-ARTICULAR; INTRALESIONAL; INTRAMUSCULAR at 14:27

## 2025-02-03 RX ADMIN — LABETALOL HYDROCHLORIDE 100 MG: 100 TABLET, FILM COATED ORAL at 15:38

## 2025-02-03 RX ADMIN — NITROFURANTOIN MONOHYDRATE/MACROCRYSTALS 100 MG: 75; 25 CAPSULE ORAL at 14:27

## 2025-02-03 RX ADMIN — ACETAMINOPHEN 650 MG: 325 TABLET ORAL at 20:40

## 2025-02-03 RX ADMIN — SODIUM CHLORIDE, POTASSIUM CHLORIDE, SODIUM LACTATE AND CALCIUM CHLORIDE 75 ML/HR: 600; 310; 30; 20 INJECTION, SOLUTION INTRAVENOUS at 14:36

## 2025-02-03 RX ADMIN — LABETALOL HYDROCHLORIDE 100 MG: 100 TABLET, FILM COATED ORAL at 20:41

## 2025-02-03 RX ADMIN — CALCIUM CARBONATE (ANTACID) CHEW TAB 500 MG 2 TABLET: 500 CHEW TAB at 23:46

## 2025-02-03 RX ADMIN — NITROFURANTOIN MONOHYDRATE/MACROCRYSTALS 100 MG: 75; 25 CAPSULE ORAL at 20:39

## 2025-02-03 NOTE — PROGRESS NOTES
"[facility]  Huyen Interiano  : 2000  MRN: [id number]  CSN: [id number]    Hospital Day: 2    CC: hospital follow-up for elevated BP and decreased fetal movement     Antepartum Progress Note    Subjective   Huyen Interiano is a 24 y.o.  at 27w3d who presented for decreased fetal movement and was found to have mild range BP on admission. Pregnancy complicated by short interval pregnancy, history of prior GHTN, factor V Leiden heterozygous and new IUGR noted by AC at 4th percentile on .     She is overall feeling well this morning. Reports a mild headache, no vision change. No chest pain, shortness of breath. She did not get much sleep last night. She is feeling baby move. No contractions or bleeding.        Review of Systems   Constitutional:  Positive for fatigue. Negative for fever.   Eyes:  Negative for visual disturbance.   Gastrointestinal:  Negative for abdominal pain.   Genitourinary:  Negative for vaginal pain.          Objective     Patient Vitals for the past 24 hrs:   BP Temp Temp src Pulse Resp SpO2 Height   25 0750 134/81 97.4 °F (36.3 °C) Oral 115 16 -- --   25 0507 131/66 -- -- (!) 129 -- 98 % --   25 0002 138/84 98 °F (36.7 °C) Oral (!) 121 16 -- --   25 145/87 -- -- -- -- -- --   25 -- -- -- -- -- -- 162.6 cm (64\")   25 (!) 156/102 -- -- -- -- -- --   25 1940 149/92 -- -- -- -- -- --   25 1930 159/96 -- -- -- -- -- --   25 1920 153/94 -- -- -- -- -- --   25 1910 151/83 -- -- -- -- -- --   25 1900 (!) 147/101 -- -- -- -- -- --   25 1855 -- -- -- (!) 124 -- 99 % --   25 -- -- -- 115 -- -- --   25 -- -- -- 114 -- 98 % --   25 -- -- -- (!) 122 -- 99 % --   25 -- -- -- (!) 126 -- 97 % --   25 156/99 -- -- 110 -- 98 % --   25 -- -- -- (!) 130 -- 98 % --   25 -- -- -- (!) 121 -- 97 % --   25 -- -- -- (!) " 125 -- 97 % --   02/02/25 1846 -- -- -- (!) 127 -- 96 % --   02/02/25 1845 -- -- -- 116 -- 96 % --   02/02/25 1844 -- -- -- 118 -- 97 % --   02/02/25 1843 -- -- -- (!) 123 -- 96 % --   02/02/25 1841 146/95 -- -- -- -- -- --   02/02/25 1837 -- -- -- (!) 127 -- 97 % --   02/02/25 1835 -- -- -- 119 -- 96 % --   02/02/25 1834 -- -- -- 118 -- 97 % --   02/02/25 1833 -- -- -- 118 -- 96 % --   02/02/25 1832 -- -- -- 119 -- 97 % --   02/02/25 1831 -- -- -- 115 -- 97 % --   02/02/25 1830 140/97 -- -- 117 -- 97 % --   02/02/25 1829 -- -- -- (!) 124 -- 97 % --   02/02/25 1828 -- -- -- (!) 124 -- 98 % --   02/02/25 1827 -- -- -- (!) 129 -- 97 % --   02/02/25 1826 -- -- -- (!) 125 -- 97 % --   02/02/25 1825 -- -- -- (!) 123 -- 97 % --   02/02/25 1824 -- -- -- (!) 122 -- 97 % --   02/02/25 1823 -- -- -- (!) 121 -- 97 % --   02/02/25 1822 -- -- -- (!) 136 -- 97 % --   02/02/25 1818 139/91 98.1 °F (36.7 °C) Oral -- -- -- --         General: well developed; well nourished  no acute distress  mentation appropriate   Heart: Not performed.   Lungs: breathing is unlabored   Abdomen: fundus firm and non-tender   FHT's: reactive   Cervix: was not checked.   Contractions: none        NST   Reason for test: GHTN   Time frame [id number]  Baseline 140 bpm, moderate variability, 10x10 accelerations, no decelerations   Reactive by 10x10 criteria        Assessment   IUP at 27w3d  Gestational hypertension   IUGR by AC   Short interval pregnancy   History of GHTN in prior pregnancy  Factor V Leiden heterozygous      Plan   Continue to monitor BP. Some high mild range, but no severe range BP at this time. Discussed that she meets criteria for gestational hypertension, so delivery will be by 37 weeks, but we need to monitor for any severe features of preeclampsia.   Preeclampsia labs currently stable   24 TUP currently being collected   PDC scan pending today   Plan for inpatient management at least until tomorrow (24 TUP) pending BP.   Continue  daily NST       Valerie Martins MD  2/3/2025  08:55 EST        Addendum  Discussed new Ultrasound finding of elevated UA doppler studies in the context of known IUGR and GHTN. Although asymptomatic, she has also been mildly but consistently tachycardic since admission  Will maintain IV access  Initiate a course of  steroids due to risk of  delivery  Treat suspected UTI  Ask for Cardiology input re: persistent tachycardia (in the context of GHTN)  Increase fetal monitoring to one-hour TID  M plans to repeat Doppler studies in 72 hours      Electronically signed by Trenton Mars MD, 25, 1:09 PM EST.    Addendum  Will begin Labetalol due to persistently high mid-range BP      Electronically signed by Trenton Mars MD, 25, 2:45 PM EST.
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
## 2025-02-04 ENCOUNTER — PATIENT OUTREACH (OUTPATIENT)
Dept: LABOR AND DELIVERY | Facility: HOSPITAL | Age: 25
End: 2025-02-04
Payer: MEDICAID

## 2025-02-04 LAB
ABO GROUP BLD: NORMAL
ALP SERPL-CCNC: 75 U/L (ref 39–117)
ALT SERPL W P-5'-P-CCNC: 6 U/L (ref 1–33)
AST SERPL-CCNC: 11 U/L (ref 1–32)
BACTERIA SPEC AEROBE CULT: NO GROWTH
BILIRUB SERPL-MCNC: <0.2 MG/DL (ref 0–1.2)
BLD GP AB SCN SERPL QL: NEGATIVE
CREAT SERPL-MCNC: 0.44 MG/DL (ref 0.57–1)
LDH SERPL-CCNC: 144 U/L (ref 135–214)
RH BLD: NEGATIVE
T&S EXPIRATION DATE: NORMAL
URATE SERPL-MCNC: 2.9 MG/DL (ref 2.4–5.7)

## 2025-02-04 PROCEDURE — 86900 BLOOD TYPING SEROLOGIC ABO: CPT | Performed by: STUDENT IN AN ORGANIZED HEALTH CARE EDUCATION/TRAINING PROGRAM

## 2025-02-04 PROCEDURE — 86901 BLOOD TYPING SEROLOGIC RH(D): CPT | Performed by: STUDENT IN AN ORGANIZED HEALTH CARE EDUCATION/TRAINING PROGRAM

## 2025-02-04 PROCEDURE — 25810000003 LACTATED RINGERS PER 1000 ML: Performed by: OBSTETRICS & GYNECOLOGY

## 2025-02-04 PROCEDURE — 86850 RBC ANTIBODY SCREEN: CPT | Performed by: STUDENT IN AN ORGANIZED HEALTH CARE EDUCATION/TRAINING PROGRAM

## 2025-02-04 PROCEDURE — 84075 ASSAY ALKALINE PHOSPHATASE: CPT | Performed by: STUDENT IN AN ORGANIZED HEALTH CARE EDUCATION/TRAINING PROGRAM

## 2025-02-04 PROCEDURE — 82247 BILIRUBIN TOTAL: CPT | Performed by: STUDENT IN AN ORGANIZED HEALTH CARE EDUCATION/TRAINING PROGRAM

## 2025-02-04 PROCEDURE — 83615 LACTATE (LD) (LDH) ENZYME: CPT | Performed by: STUDENT IN AN ORGANIZED HEALTH CARE EDUCATION/TRAINING PROGRAM

## 2025-02-04 PROCEDURE — 84550 ASSAY OF BLOOD/URIC ACID: CPT | Performed by: STUDENT IN AN ORGANIZED HEALTH CARE EDUCATION/TRAINING PROGRAM

## 2025-02-04 PROCEDURE — 84450 TRANSFERASE (AST) (SGOT): CPT | Performed by: STUDENT IN AN ORGANIZED HEALTH CARE EDUCATION/TRAINING PROGRAM

## 2025-02-04 PROCEDURE — 59025 FETAL NON-STRESS TEST: CPT

## 2025-02-04 PROCEDURE — 25010000002 BETAMETHASONE ACET & SOD PHOS PER 4 MG

## 2025-02-04 PROCEDURE — 84460 ALANINE AMINO (ALT) (SGPT): CPT | Performed by: STUDENT IN AN ORGANIZED HEALTH CARE EDUCATION/TRAINING PROGRAM

## 2025-02-04 PROCEDURE — 82565 ASSAY OF CREATININE: CPT | Performed by: STUDENT IN AN ORGANIZED HEALTH CARE EDUCATION/TRAINING PROGRAM

## 2025-02-04 RX ORDER — CALCIUM CARBONATE 500 MG/1
2 TABLET, CHEWABLE ORAL DAILY PRN
Status: DISCONTINUED | OUTPATIENT
Start: 2025-02-04 | End: 2025-02-06 | Stop reason: HOSPADM

## 2025-02-04 RX ORDER — BETAMETHASONE SODIUM PHOSPHATE AND BETAMETHASONE ACETATE 3; 3 MG/ML; MG/ML
INJECTION, SUSPENSION INTRA-ARTICULAR; INTRALESIONAL; INTRAMUSCULAR; SOFT TISSUE
Status: COMPLETED
Start: 2025-02-04 | End: 2025-02-04

## 2025-02-04 RX ADMIN — BETAMETHASONE SODIUM PHOSPHATE AND BETAMETHASONE ACETATE 12 MG: 3; 3 INJECTION, SUSPENSION INTRA-ARTICULAR; INTRALESIONAL; INTRAMUSCULAR at 14:20

## 2025-02-04 RX ADMIN — SODIUM CHLORIDE, POTASSIUM CHLORIDE, SODIUM LACTATE AND CALCIUM CHLORIDE 75 ML/HR: 600; 310; 30; 20 INJECTION, SOLUTION INTRAVENOUS at 01:41

## 2025-02-04 RX ADMIN — LABETALOL HYDROCHLORIDE 100 MG: 100 TABLET, FILM COATED ORAL at 14:25

## 2025-02-04 RX ADMIN — NITROFURANTOIN MONOHYDRATE/MACROCRYSTALS 100 MG: 75; 25 CAPSULE ORAL at 09:13

## 2025-02-04 RX ADMIN — ACETAMINOPHEN 650 MG: 325 TABLET ORAL at 20:56

## 2025-02-04 RX ADMIN — CALCIUM CARBONATE (ANTACID) CHEW TAB 500 MG 2 TABLET: 500 CHEW TAB at 21:40

## 2025-02-04 RX ADMIN — LABETALOL HYDROCHLORIDE 100 MG: 100 TABLET, FILM COATED ORAL at 05:45

## 2025-02-04 RX ADMIN — NITROFURANTOIN MONOHYDRATE/MACROCRYSTALS 100 MG: 75; 25 CAPSULE ORAL at 21:40

## 2025-02-04 RX ADMIN — CALCIUM CARBONATE (ANTACID) CHEW TAB 500 MG 2 TABLET: 500 CHEW TAB at 12:27

## 2025-02-04 RX ADMIN — LABETALOL HYDROCHLORIDE 100 MG: 100 TABLET, FILM COATED ORAL at 21:40

## 2025-02-04 NOTE — OUTREACH NOTE
"[facility]  Check-In    Current Estimated Gestational Age: 27w4d      Questions/Answers      Flowsheet Row Responses   Best Method for Contacting Cell   Demographics Reviewed Yes   Currently Employed No   Able to keep appointments as scheduled Yes   Gender(s) and Name(s) Baby Boy   Baby Active/Feeling Fetal Movemen Yes   How are you presently feeling? \"I'm feeling good\"   Are you having any of the following symptoms? --  [Denies]   New Diagnosis HTN, Other   Chronic or Gestational HTN Gestational   Other New Diagnosis Elevated Dopplers and IUGR   Education related to new diagnoses/home equipment No   Resource/Environmental Concerns Financial, Reliable Transportation   Do you have any questions related to your care experience, your pregnancy, plans for delivery, any concerns, etc? No   Other Education HANDS, Insurance benefits/Incentives          Feeling well and reports good fetal movement. Huyen is currently inpatient on [facility] for GHTN, increased fetal dopplers and IUGR.  She is receiving  steroids and was started on Labetalol for moderate range pressures. Denies any concerning symptoms of preeclampsia currently but discussed what to watch out for. She is current on POC and has no questions or concerns at this time.   Discussed that she still has time for childbirth education if she is interested. Discussed breastfeeding video and outpatient lactation clinic support as well as WIC support.     Updated resources provided via [employer] message. Contact information provided. Encouraged to call with questions, concerns, or for support. Will plan f/u around 32 weeks to ensure she has everything she needs in place and feels ready for delivery.    Lisa Ash RN  Maternity Nurse Navigator    2025, 13:42 EST          "
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
## 2025-02-04 NOTE — PROGRESS NOTES
[place]  Huyen Interiano  : 2000  MRN: [id number]  CSN: [id number]    Hospital Day: 3    CC: hospital follow-up for GHTN and IUGR     Antepartum Progress Note    Subjective   Huyen Interiano is a 24 y.o.  at 27w4d who presented for decreased fetal movement. She was found to have high mild range BP on admission. Pregnancy complicated by short interval pregnancy, history of prior GHTN, factor V Leiden heterozygous and new IUGR noted by AC at 4th percentile on . On 2/3/25, patient had formal ultrasound that demonstrated IUGR with elevated Dopplers. Patient received BTMS and cardiology was consulted for HTN and persistent tachycardia.     Today, patient reports that she is feeling well but tired. She had a headache yesterday, but this went away with tylenol. No other concerns today. No vision changes, shortness of breath. Baby is moving well. No contractions or vaginal bleeding.     Review of Systems   Constitutional:  Positive for fatigue.   Eyes:  Negative for visual disturbance.   Respiratory:  Negative for shortness of breath.    Cardiovascular:  Negative for chest pain.   Gastrointestinal:  Negative for abdominal pain and nausea.   Genitourinary:  Negative for vaginal bleeding.   All other systems reviewed and are negative.         Objective     Patient Vitals for the past 24 hrs:   BP Temp Temp src Pulse Resp   25 0727 127/61 98.1 °F (36.7 °C) Oral 110 16   25 0542 107/52 -- -- 98 16   25 0356 112/58 98 °F (36.7 °C) Oral 105 18   25 2344 132/76 98.2 °F (36.8 °C) Oral 111 18   25 2023 135/74 98.7 °F (37.1 °C) Oral 120 18   25 1640 129/74 98.7 °F (37.1 °C) Oral 112 --   25 1210 135/73 97.8 °F (36.6 °C) Oral 115 18             General: well developed; well nourished  no acute distress  mentation appropriate   Heart: Not performed.   Lungs: breathing is unlabored   Abdomen: fundus firm and non-tender   FHT's: Overnight monitoring reassuring     Cervix: was not checked.                  Assessment   IUP at 27w4d  Gestational hypertension - not yet with severe features   Fetal growth restriction with elevated UA Dopplers   Short interval pregnancy   Tachycardia   UTI      Plan   Continue inpatient management  Complete course of BTMS (started 2/3)   Continue labetalol 100 mg TID  -- BP and HR improved. Cardiology consulted 2/3 with normal  Echo. No further cardiac testing recommended.   Repeat PreE panel today   Continue macrobid for UTI   Continue one hour TID monitoring   Repeat DFS twice weekly while inpatient (due Thurs)       Valerie Martins MD  2/4/2025  08:10 EST

## 2025-02-05 PROBLEM — O13.2 GESTATIONAL HYPERTENSION WITHOUT SIGNIFICANT PROTEINURIA IN SECOND TRIMESTER: Status: ACTIVE | Noted: 2025-02-03

## 2025-02-05 LAB
QT INTERVAL: 318 MS
QTC INTERVAL: 442 MS

## 2025-02-05 PROCEDURE — 59025 FETAL NON-STRESS TEST: CPT

## 2025-02-05 PROCEDURE — 25810000003 LACTATED RINGERS PER 1000 ML: Performed by: OBSTETRICS & GYNECOLOGY

## 2025-02-05 RX ORDER — SODIUM CHLORIDE, SODIUM LACTATE, POTASSIUM CHLORIDE, CALCIUM CHLORIDE 600; 310; 30; 20 MG/100ML; MG/100ML; MG/100ML; MG/100ML
75 INJECTION, SOLUTION INTRAVENOUS CONTINUOUS
Status: DISCONTINUED | OUTPATIENT
Start: 2025-02-05 | End: 2025-02-06 | Stop reason: HOSPADM

## 2025-02-05 RX ORDER — PANTOPRAZOLE SODIUM 40 MG/1
40 TABLET, DELAYED RELEASE ORAL 2 TIMES DAILY PRN
Status: DISCONTINUED | OUTPATIENT
Start: 2025-02-05 | End: 2025-02-06 | Stop reason: HOSPADM

## 2025-02-05 RX ADMIN — LABETALOL HYDROCHLORIDE 100 MG: 100 TABLET, FILM COATED ORAL at 14:39

## 2025-02-05 RX ADMIN — ACETAMINOPHEN 650 MG: 325 TABLET ORAL at 20:08

## 2025-02-05 RX ADMIN — PANTOPRAZOLE SODIUM 40 MG: 40 TABLET, DELAYED RELEASE ORAL at 05:36

## 2025-02-05 RX ADMIN — NITROFURANTOIN MONOHYDRATE/MACROCRYSTALS 100 MG: 75; 25 CAPSULE ORAL at 08:39

## 2025-02-05 RX ADMIN — LABETALOL HYDROCHLORIDE 100 MG: 100 TABLET, FILM COATED ORAL at 05:28

## 2025-02-05 RX ADMIN — LABETALOL HYDROCHLORIDE 100 MG: 100 TABLET, FILM COATED ORAL at 21:05

## 2025-02-05 RX ADMIN — SODIUM CHLORIDE, POTASSIUM CHLORIDE, SODIUM LACTATE AND CALCIUM CHLORIDE 75 ML/HR: 600; 310; 30; 20 INJECTION, SOLUTION INTRAVENOUS at 20:58

## 2025-02-05 RX ADMIN — NITROFURANTOIN MONOHYDRATE/MACROCRYSTALS 100 MG: 75; 25 CAPSULE ORAL at 21:05

## 2025-02-05 RX ADMIN — SODIUM CHLORIDE, POTASSIUM CHLORIDE, SODIUM LACTATE AND CALCIUM CHLORIDE 75 ML/HR: 600; 310; 30; 20 INJECTION, SOLUTION INTRAVENOUS at 16:54

## 2025-02-05 NOTE — PROGRESS NOTES
SARA Bajwa  Huyen Interiano  : 2000  MRN: [id number]  CSN: [id number]    Antepartum Progress Note  HD#4  CC: hospital follow up of IUGR, GHTN  Subjective   She is without complaints this morning. She reports fetal movements. She denies any VB, LOF, regular contractions. She denies any new headaches, VC, RUQ pain or SOA.      Objective     Min/max vitals past 24 hours:   Temp  Min: 98.3 °F (36.8 °C)  Max: 98.8 °F (37.1 °C)  BP  Min: 102/59  Max: 137/77  Pulse  Min: 94  Max: 130  Resp  Min: 14  Max: 16         General: well developed; well nourished  no acute distress  mentation appropriate   Heart: Not performed.   Lungs: breathing is unlabored   Abdomen: soft, non-tender; no masses  no umbilical or inguinal hernias are present  no hepato-splenomegaly   FHT's: reassuring and category 1   Cervix: was not checked.   Contractions: none          CBC:   Lab Results   Component Value Date    WBC 11.94 (H) 2025    HGB 11.8 (L) 2025    HCT 33.6 (L) 2025     2025     Pre-eclampsia Panel:   Lab Results   Component Value Date    ALKPHOS 75 2025    AST 11 2025    ALT 6 2025    BILITOT <0.2 2025     2025    URICACID 2.9 2025    CREATININE 0.44 (L) 2025     24 hour urine results:   Lab Results   Component Value Date    URINEVOLUME 2,400 2025    [id number] 156.0 (H) 2025          Assessment   IUP at 27w5d  Gestational hypertension without severe features - currently on labetalol 100 mg TID  IUGR with elevated UA dopplers   Short interval pregnancy   Tachycardia - s/p echo, EKG, cards consult and improved  UTI  Fetal status reassuring     Plan   Continue inpatient management and plan for repeat UA dopplers tomorrow with MFM/PDC  Continue labetalol 100 mg TID (GHTN, tachycardia)  Continue macrobid for UTI  1 hour TID monitoring     Daria Thompson MD  2025  08:20 EST

## 2025-02-05 NOTE — PROGRESS NOTES
Non Stress Test    [facility]  Patient: Huyen Interiano  : 2000  MRN: [id number]  CSN: [id number]  Date: 2025    Estimated Date of Delivery: 25  Gestational Age: 27w5d    Indication for NST IUGR, GHTN  Total time > 1 hour                    Interpretation    Baseline  beats per minute   Accelerations  Present   Decelerations Absent       Additional Comments Cat 1 FHT       Recommendations for f/u Continue TID 1 hour monitoring        This note has been electronically signed.    Daria Thompson MD

## 2025-02-06 ENCOUNTER — APPOINTMENT (OUTPATIENT)
Dept: WOMENS IMAGING | Facility: HOSPITAL | Age: 25
DRG: 832 | End: 2025-02-06
Payer: MEDICAID

## 2025-02-06 VITALS
RESPIRATION RATE: 18 BRPM | SYSTOLIC BLOOD PRESSURE: 121 MMHG | OXYGEN SATURATION: 98 % | HEART RATE: 105 BPM | DIASTOLIC BLOOD PRESSURE: 71 MMHG | TEMPERATURE: 98.5 F | BODY MASS INDEX: 29.11 KG/M2 | HEIGHT: 64 IN

## 2025-02-06 PROCEDURE — 76815 OB US LIMITED FETUS(S): CPT

## 2025-02-06 PROCEDURE — 76820 UMBILICAL ARTERY ECHO: CPT

## 2025-02-06 PROCEDURE — 59025 FETAL NON-STRESS TEST: CPT

## 2025-02-06 RX ORDER — NITROFURANTOIN 25; 75 MG/1; MG/1
100 CAPSULE ORAL EVERY 12 HOURS SCHEDULED
Qty: 7 CAPSULE | Refills: 0 | Status: SHIPPED | OUTPATIENT
Start: 2025-02-06 | End: 2025-02-10

## 2025-02-06 RX ORDER — LABETALOL 100 MG/1
100 TABLET, FILM COATED ORAL EVERY 8 HOURS SCHEDULED
Qty: 90 TABLET | Refills: 3 | Status: SHIPPED | OUTPATIENT
Start: 2025-02-06

## 2025-02-06 RX ADMIN — LABETALOL HYDROCHLORIDE 100 MG: 100 TABLET, FILM COATED ORAL at 05:48

## 2025-02-06 RX ADMIN — NITROFURANTOIN MONOHYDRATE/MACROCRYSTALS 100 MG: 75; 25 CAPSULE ORAL at 08:54

## 2025-02-06 NOTE — DISCHARGE SUMMARY
Date of Discharge:  2025    Discharge Diagnosis:   IUP 27w6d  IUGR  Gestational Hypertension    Presenting Problem/History of Present Illness  Second trimester pregnancy [Z34.92]  Gestational hypertension [O13.9]       Hospital Course  Patient is a 24 y.o. female presented with decreased fetal movement in the context of known IUGR and noted to have new-onset HTN. She underwent a course of  steroids. Preeclampsia labs were essentially normal. MFM scan initially showed elevated doppler studies which resolved on repeat exam. Otherwise fetal well-being testing has been reassuring. Her BP is well-controled on Labetalol. Maternal ECHO performed and Cardiology Consult obtained due to persistent tachycardia which was normal. She had an incidentally noted abn UA and is being treated with macrobid despite the culture being negative. which is being treated.   She is stable for discharge with short interval follow up.      Consults:   Consults       Date and Time Order Name Status Description    2/3/2025  1:01 PM Inpatient Cardiology Consult Completed             Pertinent Test Results:   Lab Results   Component Value Date    WBC 11.94 (H) 2025    HGB 11.8 (L) 2025    HCT 33.6 (L) 2025    MCV 86.6 2025     2025    HEPBSAG Non-Reactive 2024     Results from last 7 days   Lab Units 25  0947 25  1841   AST (SGOT) U/L 11 13     Results from last 7 days   Lab Units 25  0947 25  1841   ALT (SGPT) U/L 6 8      Results from last 7 days   Lab Units 25  0947 25  1841   CREATININE mg/dL 0.44* 0.55*      Results from last 7 days   Lab Units 25  0947 25  1841   BILIRUBIN mg/dL <0.2 0.2       Condition on Discharge:  stable    Vital Signs  Temp:  [98.2 °F (36.8 °C)-98.8 °F (37.1 °C)] 98.5 °F (36.9 °C)  Heart Rate:  [] 105  Resp:  [16-20] 18  BP: ()/(52-77) 121/71    Physical Exam:   No exam performed today,    Discharge  Disposition  Home or Self Care    Discharge Medications     Discharge Medications        New Medications        Instructions Start Date   labetalol 100 MG tablet  Commonly known as: NORMODYNE   100 mg, Oral, Every 8 Hours Scheduled      nitrofurantoin (macrocrystal-monohydrate) 100 MG capsule  Commonly known as: MACROBID   100 mg, Oral, Every 12 Hours Scheduled             Continue These Medications        Instructions Start Date   magnesium oxide 400 MG tablet  Commonly known as: MAG-OX   400 mg, Oral, Daily      PRENATAL VITAMINS PO   1 tablet, Daily               Activity at Discharge: modified bedrest    Follow-up Appointments  Future Appointments   Date Time Provider Department Center   2/10/2025  8:00 AM Jerad Kaiser MD [facility]   2/11/2025  9:20 AM Valerie Martins MD [facility]   2/11/2025 11:30 AM [facility] US 1  [facility]  [facility]   2/11/2025 11:30 AM [facility] FOLLOW UP [facility] None   2/17/2025 10:20 AM Valerie Martins MD [facility]   2/18/2025 10:15 AM [facility] US 1  [facility]  [facility]   2/18/2025 10:15 AM  [facility] FOLLOW UP [facility] None   2/25/2025  9:45 AM [facility] US 1  [facility]  [facility]   2/25/2025  9:45 AM [facility] FOLLOW UP [facility] None   2/25/2025 11:10 AM Valerie Martins MD [facility]   3/4/2025  9:15 AM [facility] US 1  [facility]  [facility]   3/4/2025  9:15 AM [facility] FOLLOW UP [facility] None         Test Results Pending at Discharge       Trenton Mars MD  02/06/25  13:47 EST
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
## 2025-02-10 PROBLEM — O20.9 BLEEDING IN EARLY PREGNANCY: Status: RESOLVED | Noted: 2024-10-01 | Resolved: 2025-02-10

## 2025-02-10 PROBLEM — Z34.92 SECOND TRIMESTER PREGNANCY: Status: RESOLVED | Noted: 2025-02-02 | Resolved: 2025-02-10

## 2025-02-10 PROBLEM — R42 DIZZINESS: Status: RESOLVED | Noted: 2024-06-18 | Resolved: 2025-02-10

## 2025-02-11 ENCOUNTER — HOSPITAL ENCOUNTER (OUTPATIENT)
Dept: WOMENS IMAGING | Facility: HOSPITAL | Age: 25
Discharge: HOME | End: 2025-02-11
Admitting: STUDENT IN AN ORGANIZED HEALTH CARE EDUCATION/TRAINING PROGRAM
Payer: MEDICAID

## 2025-02-11 ENCOUNTER — OFFICE VISIT (OUTPATIENT)
Dept: OBSTETRICS AND GYNECOLOGY | Facility: HOSPITAL | Age: 25
End: 2025-02-11
Payer: MEDICAID

## 2025-02-11 ENCOUNTER — ROUTINE PRENATAL (OUTPATIENT)
Dept: OBSTETRICS AND GYNECOLOGY | Facility: CLINIC | Age: 25
End: 2025-02-11
Payer: MEDICAID

## 2025-02-11 VITALS — SYSTOLIC BLOOD PRESSURE: 124 MMHG | DIASTOLIC BLOOD PRESSURE: 84 MMHG | BODY MASS INDEX: 29.01 KG/M2 | WEIGHT: 169 LBS

## 2025-02-11 VITALS — SYSTOLIC BLOOD PRESSURE: 120 MMHG | DIASTOLIC BLOOD PRESSURE: 76 MMHG | BODY MASS INDEX: 29.01 KG/M2 | WEIGHT: 169 LBS

## 2025-02-11 DIAGNOSIS — O36.5990 FETAL GROWTH RESTRICTION ANTEPARTUM: ICD-10-CM

## 2025-02-11 DIAGNOSIS — Z87.59 HISTORY OF GESTATIONAL HYPERTENSION: Primary | ICD-10-CM

## 2025-02-11 DIAGNOSIS — D68.51 HETEROZYGOUS FACTOR V LEIDEN AFFECTING PREGNANCY IN FIRST TRIMESTER, ANTEPARTUM: ICD-10-CM

## 2025-02-11 DIAGNOSIS — O13.2 GESTATIONAL HYPERTENSION WITHOUT SIGNIFICANT PROTEINURIA IN SECOND TRIMESTER: ICD-10-CM

## 2025-02-11 DIAGNOSIS — O09.93 SUPERVISION OF HIGH RISK PREGNANCY IN THIRD TRIMESTER: ICD-10-CM

## 2025-02-11 DIAGNOSIS — O99.111 HETEROZYGOUS FACTOR V LEIDEN AFFECTING PREGNANCY IN FIRST TRIMESTER, ANTEPARTUM: ICD-10-CM

## 2025-02-11 DIAGNOSIS — O36.5990 FETAL GROWTH RESTRICTION ANTEPARTUM: Primary | ICD-10-CM

## 2025-02-11 DIAGNOSIS — O09.891 SHORT INTERVAL BETWEEN PREGNANCIES AFFECTING PREGNANCY IN FIRST TRIMESTER, ANTEPARTUM: ICD-10-CM

## 2025-02-11 LAB
GLUCOSE UR STRIP-MCNC: NEGATIVE MG/DL
LEUKOCYTE EST, POC: NEGATIVE
NITRITE UR-MCNC: NEGATIVE MG/ML
PROT UR STRIP-MCNC: ABNORMAL MG/DL

## 2025-02-11 PROCEDURE — 76819 FETAL BIOPHYS PROFIL W/O NST: CPT

## 2025-02-11 PROCEDURE — 76820 UMBILICAL ARTERY ECHO: CPT

## 2025-02-11 NOTE — PROGRESS NOTES
Chief Complaint   Patient presents with    Routine Prenatal Visit     NST started @ 9:26 / lower back pain on the sides / Stomach pain in the middle of her stomach (says it feels like cramps)       Huyen Interiano is a 24 y.o.  at 28w4d who presents for follow up prenatal visit. Patient was admitted to antepartum on 25-25 for known FGR and new onset gestational hypertension. She underwent a course of steroids and had stable preeclampsia labs. She had MFM consultation -- original ultrasound had elected dopplers that resolved on repeat exam and patient had cardiology consultation due to persistent tachycardia - normal Echo. She was started on labetalol with good control of her BP.     Overall she is feeling ill today. Her whole family has had a stomach bug over the weekend (nausea, vomiting and diarrhea) and she is still feeling under the weather. Mostly nausea without vomiting now. She has not had any headaches, chest pain, shortness of breath.  She is taking BP medication without problems.     Pregnancy is complicated by:  - Gestational hypertension - on labetalol 100 mg TID   - FGR with history of elevated Dopplers - resolved on most recent scan, received BTMS 2/3-   - Short interval pregnancy - delivered 2024  - History of GHTN last pregnancy (2024) - heart palpitations with ASA   - Factor V Leiden heterozygous   - Anxiety/depression    /84   Wt 76.7 kg (169 lb)   LMP 2024   BMI 29.01 kg/m²    Gen: NAD   Abdomen: soft, nontender, gravid  Pelvic: deferred    ASSESSMENT/PLAN       Gestational hypertension   - Diagnosed on antepartum stay 2/3- and started on labetalol 100 mg BID    - Plan for weekly visits with weekly labs   - Continue  testing   - Growth US as below   - Delivery by 37 weeks or sooner pending FGR/BP     Fetal growth restriction by AC 5th percentile  - Received BTMS /3-4  - Elevated Dopplers on  but resolved on   - Weekly BPP and UA  Dopplers at PDC   - Repeat growth scan in 2 weeks      Routine prenatal care   - Prenatal labs normal  - Genetic testing reviewed: NIPT low risk    - Continue prenatal vitamin  - Prenatal anatomy ultrasound - completed, normal   - Influenza vaccine in pregnancy - declines  - 28w: GCT, RPR - normal. Declines Tdap today.   - 36 w: GBS testing      Heartburn   - Continue prilosec for heartburn (allergic reaction to Pepcid and continued symptoms with tums)    Rh negative    - rhogam given in office 10/1 given history of bleeding and Rh negative status    - Rhogam at 28 weeks - given today      Hx GHTN in last pregnancy    - Stopped baby ASA 81 mg due to heart palpitations      Factor V Leiden heterozygous    - Was on anticoagulation x 6 weeks postpartum in last pregnancy (switched from Lovenox to Xarelto due to allergy). Will plan for xarelto x 6 weeks postpartum if not nursing this postpartum period.    - ED visit 9/30 - no PE    - Hematology consult 11/13 - recommended anticoagulation postpartum only if has C/S      Short interval pregnancy   - Will plan for POP in immediate postpartum period to prevent another short interval pregnancy. Patient is afraid of needles/shots.   - She is also potentially interested in sterilization.     9. Pubic symphysis pain    - Discussed tylenol, KT tape for pain   - Discussed other option of physical therapy - patient would like to hold off at this time       NST Read  Reason for exam : JOYCE TITUS   Time frame: 0926-1008  Baseline 145 bpm, moderate variability, + accelerations by 10x10 criteria, one spontaneous deceleration with 20 minutes of reactive monitoring after deceleration.   Reactive NST with one fetal deceleration    Has PDC appointment later this morning for BPP.   Follow up in 1 week    This note was electronically signed.    Valerie Martins MD  Obstetrics and Gynecology  [facility]

## 2025-02-11 NOTE — PROGRESS NOTES
Patient denies any leaking fluid, vaginal bleeding, or contractions.  NIPT low risk.  Patient reports next follow-up appointment with Dr. Martins' office is 2/17.

## 2025-02-18 ENCOUNTER — HOSPITAL ENCOUNTER (OUTPATIENT)
Dept: WOMENS IMAGING | Facility: HOSPITAL | Age: 25
Discharge: HOME | End: 2025-02-18
Admitting: OBSTETRICS & GYNECOLOGY
Payer: MEDICAID

## 2025-02-18 ENCOUNTER — OFFICE VISIT (OUTPATIENT)
Dept: OBSTETRICS AND GYNECOLOGY | Facility: HOSPITAL | Age: 25
End: 2025-02-18
Payer: MEDICAID

## 2025-02-18 VITALS — BODY MASS INDEX: 29.28 KG/M2 | SYSTOLIC BLOOD PRESSURE: 142 MMHG | WEIGHT: 170.6 LBS | DIASTOLIC BLOOD PRESSURE: 79 MMHG

## 2025-02-18 DIAGNOSIS — Z87.59 HISTORY OF GESTATIONAL HYPERTENSION: ICD-10-CM

## 2025-02-18 DIAGNOSIS — O13.2 GESTATIONAL HYPERTENSION WITHOUT SIGNIFICANT PROTEINURIA IN SECOND TRIMESTER: ICD-10-CM

## 2025-02-18 DIAGNOSIS — R03.0 ELEVATED BP WITHOUT DIAGNOSIS OF HYPERTENSION: ICD-10-CM

## 2025-02-18 DIAGNOSIS — O36.5990 FETAL GROWTH RESTRICTION ANTEPARTUM: Primary | ICD-10-CM

## 2025-02-18 DIAGNOSIS — O36.5990 FETAL GROWTH RESTRICTION ANTEPARTUM: ICD-10-CM

## 2025-02-18 PROCEDURE — 76819 FETAL BIOPHYS PROFIL W/O NST: CPT

## 2025-02-18 PROCEDURE — 76820 UMBILICAL ARTERY ECHO: CPT

## 2025-02-18 PROCEDURE — 76816 OB US FOLLOW-UP PER FETUS: CPT

## 2025-02-18 NOTE — LETTER
"2025     Trenton Mars MD  [address] [facility] [address] [place] [id number]    Patient: Huyen Interiano   YOB: 2000   Date of Visit: 2025       Dear Trenton Mars MD,    Thank you for referring Huyen Interiano to me for evaluation. Below is a copy of my consult note.    If you have questions, please do not hesitate to call me. I look forward to following Huyen along with you.         Sincerely,        Shawnee Cormier MD        CC: No Recipients    Patient denies any leaking fluid or vaginal bleeding. She reports ramiro staples contractions.  NIPT negative.  Patient reports next follow-up appointment with Dr. Martins' office is .          Maternal/Fetal Medicine Follow Up Note     Name: Huyen Interiano    : 2000     MRN: [id number]     Referring Provider: Trenton Mars MD    Chief Complaint  IUGR; preeclampsia; elevated UA dopplers; factor V leiden    Subjective     History of Present Illness:  Huyen Interiano is a 24 y.o.  29w4d who presents today for follow up     VELMA: Estimated Date of Delivery: 25     ROS:   As noted in HPI.     Objective     Vital Signs  /79   Wt 77.4 kg (170 lb 9.6 oz)   LMP 2024  Repeat 136/84   Estimated body mass index is 29.28 kg/m² as calculated from the following:    Height as of 25: 162.6 cm (64\").    Weight as of this encounter: 77.4 kg (170 lb 9.6 oz).    Ultrasound Impression:   See viewpoint      Assessment and Plan     Huyen Interiano is a 24 y.o.  29w4d     Diagnoses and all orders for this visit:    1. Fetal growth restriction antepartum (Primary)  Assessment & Plan:  Growth today is improved. Will follow up for repeat growth in 2 weeks to confirm     Orders:  -     [facility]; Future    2. Elevated BP without diagnosis of hypertension  Assessment & Plan:  Blood pressure normal today   No symptoms        Initially mildly elevated then " normal on repeat     Follow Up  2 weeks     I spent 20 minutes caring for the patient on the day of service. This included: obtaining or reviewing a separately obtained medical history, reviewing patient records, performing a medically appropriate exam and/or evaluation, counseling or educating the patient/family/caregiver, ordering medications, labs, and/or procedures and documenting such in the medical record. This does not include time spent on review and interpretation of other tests such as fetal ultrasound or the performance of other procedures such as amniocentesis or CVS.    Shawnee Cormier MD FACOG  Maternal Fetal Medicine, [facility]     2025

## 2025-02-18 NOTE — PROGRESS NOTES
Patient denies any leaking fluid or vaginal bleeding. She reports ramiro staples contractions.  NIPT negative.  Patient reports next follow-up appointment with Dr. Martins' office is 2/25.

## 2025-02-18 NOTE — PROGRESS NOTES
"    Maternal/Fetal Medicine Follow Up Note     Name: Huyen Interiano    : 2000     MRN: [id number]     Referring Provider: Trenton Mars MD    Chief Complaint  IUGR; preeclampsia; elevated UA dopplers; factor V leiden    Subjective     History of Present Illness:  Huyen Interiano is a 24 y.o.  29w4d who presents today for follow up     VELMA: Estimated Date of Delivery: 25     ROS:   As noted in HPI.     Objective     Vital Signs  /79   Wt 77.4 kg (170 lb 9.6 oz)   LMP 2024  Repeat 136/84   Estimated body mass index is 29.28 kg/m² as calculated from the following:    Height as of 25: 162.6 cm (64\").    Weight as of this encounter: 77.4 kg (170 lb 9.6 oz).    Ultrasound Impression:   See viewpoint      Assessment and Plan     Huyen Interiano is a 24 y.o.  29w4d     Diagnoses and all orders for this visit:    1. Fetal growth restriction antepartum (Primary)  Assessment & Plan:  Growth today is improved. Will follow up for repeat growth in 2 weeks to confirm     Orders:  -     [facility] [place]; Future    2. Elevated BP without diagnosis of hypertension  Assessment & Plan:  Blood pressure normal today   No symptoms        Initially mildly elevated then normal on repeat     Follow Up  2 weeks     I spent 20 minutes caring for the patient on the day of service. This included: obtaining or reviewing a separately obtained medical history, reviewing patient records, performing a medically appropriate exam and/or evaluation, counseling or educating the patient/family/caregiver, ordering medications, labs, and/or procedures and documenting such in the medical record. This does not include time spent on review and interpretation of other tests such as fetal ultrasound or the performance of other procedures such as amniocentesis or CVS.    Shawnee Cormier MD FACOG  Maternal Fetal Medicine, [facility] [place] "     02/18/2025

## 2025-02-24 ENCOUNTER — TELEPHONE (OUTPATIENT)
Dept: OBSTETRICS AND GYNECOLOGY | Facility: CLINIC | Age: 25
End: 2025-02-24
Payer: MEDICAID

## 2025-02-24 NOTE — TELEPHONE ENCOUNTER
Called pt and gave her the number for [employer], and told her that she could ask for them to fax us a form to fill out to get the medical transport van. I also provided the fax number for our office. Pt was informed and stated understanding.

## 2025-02-26 ENCOUNTER — TELEPHONE (OUTPATIENT)
Dept: OBSTETRICS AND GYNECOLOGY | Facility: CLINIC | Age: 25
End: 2025-02-26
Payer: MEDICAID

## 2025-02-26 DIAGNOSIS — O36.5990 FETAL GROWTH RESTRICTION ANTEPARTUM: Primary | ICD-10-CM

## 2025-02-26 NOTE — TELEPHONE ENCOUNTER
If her contractions have stopped, then she does not need to come into triage. If every 5-10 minutes apart x 1 hour, I would recommend calling or coming to triage. I would like to see her in office this week if we can get her scheduled please.     Valerie Martins MD  Obstetrics and Gynecology  [facility]      shortness of breath

## 2025-02-28 ENCOUNTER — TELEPHONE (OUTPATIENT)
Dept: OBSTETRICS AND GYNECOLOGY | Facility: CLINIC | Age: 25
End: 2025-02-28

## 2025-02-28 NOTE — TELEPHONE ENCOUNTER
Called pt to let her know that I had called the Medicaid Transport company, and that she would not be able to bring her baby with her unless the baby has an appointment as well. She then said that she would  not be able to do that since she has no one to watch her baby. She also said that PDC appointments are not working out since she cannot take her baby back into the ultrasound with her either.

## 2025-03-04 ENCOUNTER — HOSPITAL ENCOUNTER (OUTPATIENT)
Dept: WOMENS IMAGING | Facility: HOSPITAL | Age: 25
Discharge: HOME | End: 2025-03-04
Admitting: OBSTETRICS & GYNECOLOGY
Payer: MEDICAID

## 2025-03-04 ENCOUNTER — OFFICE VISIT (OUTPATIENT)
Dept: OBSTETRICS AND GYNECOLOGY | Facility: HOSPITAL | Age: 25
End: 2025-03-04
Payer: MEDICAID

## 2025-03-04 ENCOUNTER — ROUTINE PRENATAL (OUTPATIENT)
Dept: OBSTETRICS AND GYNECOLOGY | Facility: CLINIC | Age: 25
End: 2025-03-04
Payer: MEDICAID

## 2025-03-04 VITALS — SYSTOLIC BLOOD PRESSURE: 126 MMHG | DIASTOLIC BLOOD PRESSURE: 80 MMHG | BODY MASS INDEX: 29.63 KG/M2 | WEIGHT: 172.6 LBS

## 2025-03-04 VITALS — SYSTOLIC BLOOD PRESSURE: 116 MMHG | BODY MASS INDEX: 29.7 KG/M2 | WEIGHT: 173 LBS | DIASTOLIC BLOOD PRESSURE: 71 MMHG

## 2025-03-04 DIAGNOSIS — O36.5990 FETAL GROWTH RESTRICTION ANTEPARTUM: ICD-10-CM

## 2025-03-04 DIAGNOSIS — O13.2 GESTATIONAL HYPERTENSION WITHOUT SIGNIFICANT PROTEINURIA IN SECOND TRIMESTER: ICD-10-CM

## 2025-03-04 DIAGNOSIS — O09.41 SUPERVISION OF HIGH-RISK PREGNANCY WITH GRAND MULTIPARITY IN FIRST TRIMESTER: Primary | ICD-10-CM

## 2025-03-04 DIAGNOSIS — Z87.59 HISTORY OF GESTATIONAL HYPERTENSION: ICD-10-CM

## 2025-03-04 DIAGNOSIS — R03.0 ELEVATED BP WITHOUT DIAGNOSIS OF HYPERTENSION: ICD-10-CM

## 2025-03-04 DIAGNOSIS — O09.891 SHORT INTERVAL BETWEEN PREGNANCIES AFFECTING PREGNANCY IN FIRST TRIMESTER, ANTEPARTUM: Primary | ICD-10-CM

## 2025-03-04 PROCEDURE — 76820 UMBILICAL ARTERY ECHO: CPT

## 2025-03-04 PROCEDURE — 76819 FETAL BIOPHYS PROFIL W/O NST: CPT

## 2025-03-04 PROCEDURE — 76816 OB US FOLLOW-UP PER FETUS: CPT

## 2025-03-04 RX ORDER — METRONIDAZOLE 500 MG/1
500 TABLET ORAL 2 TIMES DAILY
Qty: 14 TABLET | Refills: 0 | Status: SHIPPED | OUTPATIENT
Start: 2025-03-04 | End: 2025-03-11

## 2025-03-04 NOTE — PROGRESS NOTES
"Documentation of the ultrasound findings, images, and interpretations will be available in the patient's Viewpoint report which is located in the imaging tab in chart review.    Maternal/Fetal Medicine Follow Up  Note     Name: Huyen Interiano    : 2000     MRN: [id number]     Referring Provider: Trenton Mars MD    Chief Complaint  IUGR; Hx GHTN; Increase UA dops; short interval b/t preg    Subjective     History of Present Illness:  Huyen Interiano is a 24 y.o.  31w4d who presents today for IUGR    VELMA: Estimated Date of Delivery: 25     ROS:   As noted in HPI.     Objective     Vital Signs  /71   Wt 78.5 kg (173 lb)   LMP 2024   Estimated body mass index is 29.7 kg/m² as calculated from the following:    Height as of 25: 162.6 cm (64\").    Weight as of this encounter: 78.5 kg (173 lb).    Physical Exam    Ultrasound Impression:   See Viewpoint    Assessment and Plan     Huyen Interiano is a 24 y.o.  31w4d who presents today for IUGR    Diagnoses and all orders for this visit:    1. Short interval between pregnancies affecting pregnancy in first trimester, antepartum (Primary)  -     [facility]; Future    2. History of gestational hypertension  -     [facility]; Future    3. Elevated BP without diagnosis of hypertension  -     [facility]; Future    4. Fetal growth restriction antepartum  Assessment & Plan:  Patient returns today for pregnancy complicated by gestational hypertension and growth lag noted on prior scans.  Patient's most recent scan with us demonstrated overall growth at the 39th percentile with abdominal circumference at the 13th percentile.  Patient notes no complications since her last visit and notes good fetal movement.    Ultrasound today demonstrates a fetus with overall normal growth but with the abdominal circumference at approximately the 8th percentile.  " Amniotic fluid volume and umbilical artery Dopplers are normal.  BPP is 8 out of 8.    Patient evidences probable mild intrauterine growth restriction.  We will rescan the patient again in 2 weeks time to reassess fetal growth and umbilical artery Dopplers.  As the umbilical artery Dopplers are now been persistently normal and the amniotic fluid volume is normal we would recommend twice-weekly and  testing at Dr. Wayne's office.  We have an recommended that the patient increase nutrition and the patient notes that she does not smoke or vape.    Ordinarily we would recommend delivery at 39 weeks of the abdominal circumference stays above the 5th percentile and fetal weight remains good.  However, with gestational hypertension the recommendation is for delivery at 37 weeks gestation if no other complications arise.    Patient was counseled extensively regarding fetal movement will contact her provider immediately if she notices any decreased fetal movement.    Orders:  -     [facility]; Future    5. Gestational hypertension without significant proteinuria in second trimester  -     [facility]; Future         Follow Up  Return in about 2 weeks (around 3/18/2025).    I spent 20 minutes caring for the patient on the day of service. This included: obtaining or reviewing a separately obtained medical history, reviewing patient records, performing a medically appropriate exam and/or evaluation, counseling or educating the patient/family/caregiver, ordering medications, labs, and/or procedures and documenting such in the medical record. This does not include time spent on review and interpretation of other tests such as fetal ultrasound or the performance of other procedures such as amniocentesis or CVS.      Douglas A. Milligan, MD  Maternal Fetal Medicine, [facility]     2025

## 2025-03-04 NOTE — PROGRESS NOTES
Patient denies any leaking fluid or vaginal bleeding. States having some irregular contractions when more active, relieved with rest.   NIPT low risk.  Patient reports next follow-up appointment with Dr. Martins' office is today.

## 2025-03-04 NOTE — PROGRESS NOTES
Chief Complaint   Patient presents with    Routine Prenatal Visit     ON and off contractions all week, cramping / NST started @9:47       Huyen Interiano is a 24 y.o.  at 31w4d  who presents for follow up prenatal visit. Patient was admitted to antepartum on 25-25 for known FGR and new onset gestational hypertension. She underwent a course of steroids and had stable preeclampsia labs. She had MFM consultation -- original ultrasound had elevated dopplers that resolved on repeat exam and patient had cardiology consultation due to persistent tachycardia - normal Echo. She was started on labetalol with good control of her BP.     Today she is feeling okay. Having some pelvic pain and cramping this week with intermittent contractions. No bleeding, leakage of fluid, but has noticed some increased vaginal discharge. She has not had any headaches, chest pain, shortness of breath.  She is taking BP medication without problems. Reports normal BP at home.     She has had significant problems with transportation and getting to her appointments. Working with [employer] to try to get appointment transportation arranged, but significant barriers (only caregiver available for her 9 month old son and he is not allowed on the bus without his own appointment).     Pregnancy is complicated by:  - Gestational hypertension - on labetalol 100 mg TID   - FGR with history of elevated Dopplers - resolved on most recent scan, received BTMS 2/3-   - Short interval pregnancy - delivered 2024  - History of GHTN last pregnancy (2024) - heart palpitations with ASA   - Factor V Leiden heterozygous   - Anxiety/depression    /80   Wt 78.3 kg (172 lb 9.6 oz)   LMP 2024   BMI 29.63 kg/m²    Gen: NAD   Abdomen: soft, nontender, gravid  Pelvic: + green discharge, wet prep with clue cells present. Cervix closed.     ASSESSMENT/PLAN       Gestational hypertension   - Diagnosed on antepartum stay  2/3-4 and started on labetalol 100 mg BID    - Plan for weekly visits with weekly labs   - Continue  testing. Due to transportation issues, patient does not think that she will be able to make twice weekly testing. We will plan to do NST in office even if on same day as BPP with PDC.   - Growth US as below   - Delivery by 37 weeks or sooner pending FGR/BP     Fetal growth restriction by AC 5th percentile  - Received BTMS 2/3-  - Elevated Dopplers on  but resolved on  - normal Dopplers today   - EFW normal today, AC 8th percentile   - Weekly BPP and UA Dopplers at PD[facility]   - Repeat growth scan in 2 weeks      Bacterial vaginosis    BV on exam today. Will send in Flagyl     Routine prenatal care   - Prenatal labs normal  - Genetic testing reviewed: NIPT low risk    - Continue prenatal vitamin  - Prenatal anatomy ultrasound - completed, normal   - Influenza vaccine in pregnancy - declines  - 28w: GCT, RPR - normal. Declines Tdap    - 36 w: GBS testing    - Labor precautions discussed with patient     Rh negative    - rhogam given in office 10/1 given history of bleeding and Rh negative status    - Rhogam at 28 weeks - given 25      Factor V Leiden heterozygous    - Was on anticoagulation x 6 weeks postpartum in last pregnancy (switched from Lovenox to Xarelto due to allergy). Will plan for xarelto x 6 weeks postpartum if not nursing this postpartum period.    - ED visit  - no PE    - Hematology consult  - recommended anticoagulation postpartum only if has C/S      Short interval pregnancy   - Will plan for POP in immediate postpartum period to prevent another short interval pregnancy. Patient is afraid of needles/shots.   - She is also potentially interested in sterilization.       NST Read  Reason for exam : ARIELA FGR   Time frame: 1014-3229  Baseline 140 bpm, moderate variability, + accelerations, one variable deceleration with 20 minutes of reactive monitoring after deceleration.    Reactive NST with one fetal deceleration    Follow up in 1 week    This note was electronically signed.    Valerie Martins MD  Obstetrics and Gynecology  [facility]

## 2025-03-04 NOTE — ASSESSMENT & PLAN NOTE
Patient returns today for pregnancy complicated by gestational hypertension and growth lag noted on prior scans.  Patient's most recent scan with us demonstrated overall growth at the 39th percentile with abdominal circumference at the 13th percentile.  Patient notes no complications since her last visit and notes good fetal movement.    Ultrasound today demonstrates a fetus with overall normal growth but with the abdominal circumference at approximately the 8th percentile.  Amniotic fluid volume and umbilical artery Dopplers are normal.  BPP is 8 out of 8.    Patient evidences probable mild intrauterine growth restriction.  We will rescan the patient again in 2 weeks time to reassess fetal growth and umbilical artery Dopplers.  As the umbilical artery Dopplers are now been persistently normal and the amniotic fluid volume is normal we would recommend twice-weekly and  testing at Dr. Wayne's office.  We have an recommended that the patient increase nutrition and the patient notes that she does not smoke or vape.    Ordinarily we would recommend delivery at 39 weeks of the abdominal circumference stays above the 5th percentile and fetal weight remains good.  However, with gestational hypertension the recommendation is for delivery at 37 weeks gestation if no other complications arise.    Patient was counseled extensively regarding fetal movement will contact her provider immediately if she notices any decreased fetal movement.

## 2025-03-06 ENCOUNTER — HOSPITAL ENCOUNTER (OUTPATIENT)
Facility: HOSPITAL | Age: 25
Discharge: HOME | End: 2025-03-07
Attending: STUDENT IN AN ORGANIZED HEALTH CARE EDUCATION/TRAINING PROGRAM | Admitting: STUDENT IN AN ORGANIZED HEALTH CARE EDUCATION/TRAINING PROGRAM
Payer: MEDICAID

## 2025-03-06 PROBLEM — O47.00 THREATENED PRETERM LABOR: Status: ACTIVE | Noted: 2025-03-06

## 2025-03-06 LAB
ABO GROUP BLD: NORMAL
ALP SERPL-CCNC: 92 U/L (ref 39–117)
ALT SERPL W P-5'-P-CCNC: 9 U/L (ref 1–33)
AST SERPL-CCNC: 15 U/L (ref 1–32)
BACTERIA UR QL AUTO: ABNORMAL /HPF
BILIRUB SERPL-MCNC: <0.2 MG/DL (ref 0–1.2)
BILIRUB UR QL STRIP: NEGATIVE
BLD GP AB SCN SERPL QL: POSITIVE
CLARITY UR: ABNORMAL
COLOR UR: YELLOW
CREAT SERPL-MCNC: 0.43 MG/DL (ref 0.57–1)
DEPRECATED RDW RBC AUTO: 39.7 FL (ref 37–54)
ERYTHROCYTE [DISTWIDTH] IN BLOOD BY AUTOMATED COUNT: 12.9 % (ref 12.3–15.4)
GLUCOSE UR STRIP-MCNC: NEGATIVE MG/DL
HCT VFR BLD AUTO: 36.5 % (ref 34–46.6)
HGB BLD-MCNC: 12.2 G/DL (ref 12–15.9)
HGB UR QL STRIP.AUTO: NEGATIVE
HYALINE CASTS UR QL AUTO: ABNORMAL /LPF
KETONES UR QL STRIP: NEGATIVE
LDH SERPL-CCNC: 176 U/L (ref 135–214)
LEUKOCYTE ESTERASE UR QL STRIP.AUTO: ABNORMAL
MCH RBC QN AUTO: 28.5 PG (ref 26.6–33)
MCHC RBC AUTO-ENTMCNC: 33.4 G/DL (ref 31.5–35.7)
MCV RBC AUTO: 85.3 FL (ref 79–97)
NITRITE UR QL STRIP: NEGATIVE
PH UR STRIP.AUTO: 7 [PH] (ref 5–8)
PLATELET # BLD AUTO: 185 10*3/MM3 (ref 140–450)
PMV BLD AUTO: 10.3 FL (ref 6–12)
PROT UR QL STRIP: NEGATIVE
RBC # BLD AUTO: 4.28 10*6/MM3 (ref 3.77–5.28)
RBC # UR STRIP: ABNORMAL /HPF
REF LAB TEST METHOD: ABNORMAL
RESIDUAL RHIG DETECTED: NORMAL
RH BLD: NEGATIVE
SP GR UR STRIP: 1.02 (ref 1–1.03)
SQUAMOUS #/AREA URNS HPF: ABNORMAL /HPF
T&S EXPIRATION DATE: NORMAL
TREPONEMA PALLIDUM IGG+IGM AB [PRESENCE] IN SERUM OR PLASMA BY IMMUNOASSAY: NORMAL
URATE SERPL-MCNC: 3.3 MG/DL (ref 2.4–5.7)
UROBILINOGEN UR QL STRIP: ABNORMAL
WBC # UR STRIP: ABNORMAL /HPF
WBC NRBC COR # BLD AUTO: 9.08 10*3/MM3 (ref 3.4–10.8)

## 2025-03-06 PROCEDURE — 86900 BLOOD TYPING SEROLOGIC ABO: CPT | Performed by: OBSTETRICS & GYNECOLOGY

## 2025-03-06 PROCEDURE — 84450 TRANSFERASE (AST) (SGOT): CPT | Performed by: OBSTETRICS & GYNECOLOGY

## 2025-03-06 PROCEDURE — 84460 ALANINE AMINO (ALT) (SGPT): CPT | Performed by: OBSTETRICS & GYNECOLOGY

## 2025-03-06 PROCEDURE — 86780 TREPONEMA PALLIDUM: CPT | Performed by: OBSTETRICS & GYNECOLOGY

## 2025-03-06 PROCEDURE — 25010000002 CEFAZOLIN PER 500 MG: Performed by: OBSTETRICS & GYNECOLOGY

## 2025-03-06 PROCEDURE — 82247 BILIRUBIN TOTAL: CPT | Performed by: OBSTETRICS & GYNECOLOGY

## 2025-03-06 PROCEDURE — 81001 URINALYSIS AUTO W/SCOPE: CPT | Performed by: OBSTETRICS & GYNECOLOGY

## 2025-03-06 PROCEDURE — 59025 FETAL NON-STRESS TEST: CPT

## 2025-03-06 PROCEDURE — 83615 LACTATE (LD) (LDH) ENZYME: CPT | Performed by: OBSTETRICS & GYNECOLOGY

## 2025-03-06 PROCEDURE — 87086 URINE CULTURE/COLONY COUNT: CPT | Performed by: OBSTETRICS & GYNECOLOGY

## 2025-03-06 PROCEDURE — G0463 HOSPITAL OUTPT CLINIC VISIT: HCPCS

## 2025-03-06 PROCEDURE — 96365 THER/PROPH/DIAG IV INF INIT: CPT

## 2025-03-06 PROCEDURE — 96366 THER/PROPH/DIAG IV INF ADDON: CPT

## 2025-03-06 PROCEDURE — 25010000002 BETAMETHASONE ACET & SOD PHOS PER 4 MG: Performed by: OBSTETRICS & GYNECOLOGY

## 2025-03-06 PROCEDURE — 86850 RBC ANTIBODY SCREEN: CPT | Performed by: OBSTETRICS & GYNECOLOGY

## 2025-03-06 PROCEDURE — 86870 RBC ANTIBODY IDENTIFICATION: CPT | Performed by: OBSTETRICS & GYNECOLOGY

## 2025-03-06 PROCEDURE — 84550 ASSAY OF BLOOD/URIC ACID: CPT | Performed by: OBSTETRICS & GYNECOLOGY

## 2025-03-06 PROCEDURE — 96372 THER/PROPH/DIAG INJ SC/IM: CPT

## 2025-03-06 PROCEDURE — 85027 COMPLETE CBC AUTOMATED: CPT | Performed by: OBSTETRICS & GYNECOLOGY

## 2025-03-06 PROCEDURE — 25010000002 MAGNESIUM SULFATE 20 GM/500ML SOLUTION: Performed by: OBSTETRICS & GYNECOLOGY

## 2025-03-06 PROCEDURE — 86901 BLOOD TYPING SEROLOGIC RH(D): CPT | Performed by: OBSTETRICS & GYNECOLOGY

## 2025-03-06 PROCEDURE — 84075 ASSAY ALKALINE PHOSPHATASE: CPT | Performed by: OBSTETRICS & GYNECOLOGY

## 2025-03-06 PROCEDURE — 82565 ASSAY OF CREATININE: CPT | Performed by: OBSTETRICS & GYNECOLOGY

## 2025-03-06 RX ORDER — SODIUM CHLORIDE 9 MG/ML
40 INJECTION, SOLUTION INTRAVENOUS AS NEEDED
Status: DISCONTINUED | OUTPATIENT
Start: 2025-03-06 | End: 2025-03-07 | Stop reason: HOSPADM

## 2025-03-06 RX ORDER — LABETALOL 100 MG/1
100 TABLET, FILM COATED ORAL EVERY 8 HOURS SCHEDULED
Status: DISCONTINUED | OUTPATIENT
Start: 2025-03-06 | End: 2025-03-07 | Stop reason: HOSPADM

## 2025-03-06 RX ORDER — CALCIUM GLUCONATE 94 MG/ML
1 INJECTION, SOLUTION INTRAVENOUS ONCE AS NEEDED
Status: DISCONTINUED | OUTPATIENT
Start: 2025-03-06 | End: 2025-03-07 | Stop reason: HOSPADM

## 2025-03-06 RX ORDER — SODIUM CHLORIDE 0.9 % (FLUSH) 0.9 %
10 SYRINGE (ML) INJECTION AS NEEDED
Status: DISCONTINUED | OUTPATIENT
Start: 2025-03-06 | End: 2025-03-07 | Stop reason: HOSPADM

## 2025-03-06 RX ORDER — DOCUSATE SODIUM 100 MG/1
100 CAPSULE, LIQUID FILLED ORAL 2 TIMES DAILY PRN
Status: DISCONTINUED | OUTPATIENT
Start: 2025-03-06 | End: 2025-03-07 | Stop reason: HOSPADM

## 2025-03-06 RX ORDER — DEXTROSE AND SODIUM CHLORIDE 5; .2 G/100ML; G/100ML
100 INJECTION, SOLUTION INTRAVENOUS CONTINUOUS
Status: ACTIVE | OUTPATIENT
Start: 2025-03-06 | End: 2025-03-06

## 2025-03-06 RX ORDER — BISACODYL 10 MG
10 SUPPOSITORY, RECTAL RECTAL DAILY PRN
Status: DISCONTINUED | OUTPATIENT
Start: 2025-03-06 | End: 2025-03-07 | Stop reason: HOSPADM

## 2025-03-06 RX ORDER — ONDANSETRON 2 MG/ML
4 INJECTION INTRAMUSCULAR; INTRAVENOUS EVERY 8 HOURS PRN
Status: DISCONTINUED | OUTPATIENT
Start: 2025-03-06 | End: 2025-03-07 | Stop reason: HOSPADM

## 2025-03-06 RX ORDER — MAGNESIUM SULFATE HEPTAHYDRATE 40 MG/ML
2 INJECTION, SOLUTION INTRAVENOUS CONTINUOUS
Status: DISCONTINUED | OUTPATIENT
Start: 2025-03-06 | End: 2025-03-07 | Stop reason: HOSPADM

## 2025-03-06 RX ORDER — ONDANSETRON 4 MG/1
8 TABLET, ORALLY DISINTEGRATING ORAL EVERY 8 HOURS PRN
Status: DISCONTINUED | OUTPATIENT
Start: 2025-03-06 | End: 2025-03-07 | Stop reason: HOSPADM

## 2025-03-06 RX ORDER — METRONIDAZOLE 500 MG/1
500 TABLET ORAL EVERY 12 HOURS SCHEDULED
Status: DISCONTINUED | OUTPATIENT
Start: 2025-03-06 | End: 2025-03-07 | Stop reason: HOSPADM

## 2025-03-06 RX ORDER — BETAMETHASONE SODIUM PHOSPHATE AND BETAMETHASONE ACETATE 3; 3 MG/ML; MG/ML
12 INJECTION, SUSPENSION INTRA-ARTICULAR; INTRALESIONAL; INTRAMUSCULAR; SOFT TISSUE EVERY 24 HOURS
Status: COMPLETED | OUTPATIENT
Start: 2025-03-06 | End: 2025-03-07

## 2025-03-06 RX ORDER — LIDOCAINE HYDROCHLORIDE 10 MG/ML
0.5 INJECTION, SOLUTION EPIDURAL; INFILTRATION; INTRACAUDAL; PERINEURAL ONCE AS NEEDED
Status: DISCONTINUED | OUTPATIENT
Start: 2025-03-06 | End: 2025-03-07 | Stop reason: HOSPADM

## 2025-03-06 RX ORDER — ACETAMINOPHEN 325 MG/1
650 TABLET ORAL EVERY 4 HOURS PRN
Status: DISCONTINUED | OUTPATIENT
Start: 2025-03-06 | End: 2025-03-07 | Stop reason: HOSPADM

## 2025-03-06 RX ORDER — SODIUM CHLORIDE 0.9 % (FLUSH) 0.9 %
10 SYRINGE (ML) INJECTION EVERY 12 HOURS SCHEDULED
Status: DISCONTINUED | OUTPATIENT
Start: 2025-03-06 | End: 2025-03-07 | Stop reason: HOSPADM

## 2025-03-06 RX ADMIN — METRONIDAZOLE 500 MG: 500 TABLET ORAL at 20:34

## 2025-03-06 RX ADMIN — LABETALOL HYDROCHLORIDE 100 MG: 100 TABLET, FILM COATED ORAL at 21:40

## 2025-03-06 RX ADMIN — MAGNESIUM SULFATE IN WATER 2 G/HR: 20 INJECTION, SOLUTION INTRAVENOUS at 23:11

## 2025-03-06 RX ADMIN — DEXTROSE AND SODIUM CHLORIDE 100 ML/HR: 5; 200 INJECTION, SOLUTION INTRAVENOUS at 14:30

## 2025-03-06 RX ADMIN — BETAMETHASONE SODIUM PHOSPHATE AND BETAMETHASONE ACETATE 12 MG: 3; 3 INJECTION, SUSPENSION INTRA-ARTICULAR; INTRALESIONAL; INTRAMUSCULAR at 14:15

## 2025-03-06 RX ADMIN — MAGNESIUM SULFATE IN WATER 2 G/HR: 20 INJECTION, SOLUTION INTRAVENOUS at 14:54

## 2025-03-06 RX ADMIN — CEFAZOLIN 1000 MG: 1 INJECTION, POWDER, FOR SOLUTION INTRAMUSCULAR; INTRAVENOUS at 23:11

## 2025-03-06 RX ADMIN — SODIUM CHLORIDE 2000 MG: 900 INJECTION INTRAVENOUS at 14:36
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
## 2025-03-06 NOTE — LETTER
March 6, 2025      [facility]  [address] [facility] [address]  [phone]          Patient: Huyen Interiano   YOB: 2000   Date of Visit: 3/6/2025       To Whom It May Concern:    Huyen Interiano was seen at [facility] on 3/6/2025.    Please excuse Jamey from work from 3/6-3/7/2025        Sincerely,   [facility]

## 2025-03-07 ENCOUNTER — PATIENT OUTREACH (OUTPATIENT)
Dept: LABOR AND DELIVERY | Facility: HOSPITAL | Age: 25
End: 2025-03-07
Payer: MEDICAID

## 2025-03-07 VITALS
SYSTOLIC BLOOD PRESSURE: 119 MMHG | TEMPERATURE: 98.4 F | RESPIRATION RATE: 16 BRPM | DIASTOLIC BLOOD PRESSURE: 56 MMHG | HEIGHT: 64 IN | BODY MASS INDEX: 29.88 KG/M2 | WEIGHT: 175 LBS | HEART RATE: 111 BPM | OXYGEN SATURATION: 98 %

## 2025-03-07 LAB — BACTERIA SPEC AEROBE CULT: NO GROWTH

## 2025-03-07 PROCEDURE — 25010000002 CEFAZOLIN PER 500 MG: Performed by: OBSTETRICS & GYNECOLOGY

## 2025-03-07 PROCEDURE — 96372 THER/PROPH/DIAG INJ SC/IM: CPT

## 2025-03-07 PROCEDURE — 59025 FETAL NON-STRESS TEST: CPT

## 2025-03-07 PROCEDURE — 25010000002 BETAMETHASONE ACET & SOD PHOS PER 4 MG: Performed by: OBSTETRICS & GYNECOLOGY

## 2025-03-07 PROCEDURE — 96366 THER/PROPH/DIAG IV INF ADDON: CPT

## 2025-03-07 PROCEDURE — G0378 HOSPITAL OBSERVATION PER HR: HCPCS

## 2025-03-07 PROCEDURE — 25010000002 MAGNESIUM SULFATE 20 GM/500ML SOLUTION: Performed by: OBSTETRICS & GYNECOLOGY

## 2025-03-07 RX ORDER — DEXTROSE AND SODIUM CHLORIDE 5; .2 G/100ML; G/100ML
75 INJECTION, SOLUTION INTRAVENOUS CONTINUOUS
Status: DISCONTINUED | OUTPATIENT
Start: 2025-03-07 | End: 2025-03-07 | Stop reason: HOSPADM

## 2025-03-07 RX ORDER — ACETAMINOPHEN 325 MG/1
650 TABLET ORAL EVERY 4 HOURS PRN
Qty: 30 TABLET | Refills: 1 | Status: SHIPPED | OUTPATIENT
Start: 2025-03-07

## 2025-03-07 RX ORDER — DEXTROSE AND SODIUM CHLORIDE 5; .2 G/100ML; G/100ML
75 INJECTION, SOLUTION INTRAVENOUS CONTINUOUS
Status: ACTIVE | OUTPATIENT
Start: 2025-03-07 | End: 2025-03-07

## 2025-03-07 RX ADMIN — CEFAZOLIN 1000 MG: 1 INJECTION, POWDER, FOR SOLUTION INTRAMUSCULAR; INTRAVENOUS at 06:44

## 2025-03-07 RX ADMIN — LABETALOL HYDROCHLORIDE 100 MG: 100 TABLET, FILM COATED ORAL at 04:26

## 2025-03-07 RX ADMIN — LABETALOL HYDROCHLORIDE 100 MG: 100 TABLET, FILM COATED ORAL at 12:59

## 2025-03-07 RX ADMIN — BETAMETHASONE SODIUM PHOSPHATE AND BETAMETHASONE ACETATE 12 MG: 3; 3 INJECTION, SUSPENSION INTRA-ARTICULAR; INTRALESIONAL; INTRAMUSCULAR at 14:46

## 2025-03-07 RX ADMIN — METRONIDAZOLE 500 MG: 500 TABLET ORAL at 09:41

## 2025-03-07 RX ADMIN — MAGNESIUM SULFATE IN WATER 2 G/HR: 20 INJECTION, SOLUTION INTRAVENOUS at 09:41

## 2025-03-07 NOTE — DISCHARGE SUMMARY
[place]   Huyen Interiano  : 2000  MRN: [id number]  CSN: [id number]    Discharge Summary      Date of Admission: 3/6/2025   Date of Discharge: 3/7/2025   Discharge Diagnoses: Threatened  labor    D/C Hospital Problem List:   Threatened  labor    Short interval between pregnancies affecting pregnancy in first trimester, antepartum    Heterozygous factor V Leiden affecting pregnancy in first trimester, antepartum    Fetal growth restriction antepartum    Gestational hypertension without significant proteinuria in second trimester     Procedures Performed:       Consults: None    Brief History: Patient is a 24 y.o.  who presented with regular contractions and cervix changed from 1 to 2 cm at 31w6d.Pregnancy complicated by FGR with AC 8th percentile (EFW 29th 3/4/25) with normal Dopplers, GHTN, maternal tachycardia, short interval pregnancy.  She received BTMS /3-.   Hospital Course:  She received rescue course BTMS 3/6-3/7. She was started on magnesium for tocolysis and neuroprotection through steroid window with cessation of contractions. She had no contractions in evening after stopping magnesium, and patient was discharged home. She was counseled to light activity restrictions and pelvic rest and to return for any labor symptoms. She has close outpatient follow up scheduled on Tuesday next week with  testing.    Pending Studies: Pending tests: none   Condition at discharge: rapidly improving   Discharge Medications:    Your medication list        START taking these medications        Instructions Last Dose Given Next Dose Due   acetaminophen 325 MG tablet  Commonly known as: TYLENOL      Take 2 tablets by mouth Every 4 (Four) Hours As Needed for Mild Pain or Headache.              CONTINUE taking these medications        Instructions Last Dose Given Next Dose Due   labetalol 100 MG tablet  Commonly known as: NORMODYNE      Take 1 tablet by mouth Every 8 (Eight)  Hours.       metroNIDAZOLE 500 MG tablet  Commonly known as: Flagyl      Take 1 tablet by mouth 2 (Two) Times a Day for 7 days.       PRENATAL VITAMINS PO      Take 1 tablet by mouth Daily.              STOP taking these medications      magnesium oxide 400 MG tablet  Commonly known as: MAG-OX                  Where to Get Your Medications        These medications were sent to [facility] - [place] [address], [place] [address]      Hours: Monday to Friday 7 AM to 5:30 PM, Saturday & Sunday 8 AM to 4:30 PM Phone: [phone]   acetaminophen 325 MG tablet        Discharge Disposition: home   Follow-up: Future Appointments   Date Time Provider Department Center   3/12/2025  2:00 PM Valerie Martins MD [facility]   3/12/2025  3:00 PM [facility] US 1 BH [facility]   3/18/2025  8:15 AM [facility]C US 1 BH [facility]  [facility]   3/18/2025  8:15 AM BH [facility] PDC FOLLOW UP [facility] None   3/18/2025 10:15 AM Ronda Garcias APRN [facility]   4/1/2025  9:15 AM [facility] PDC US 1 BH [facility]  [facility]   4/1/2025  9:15 AM BH [facility] PDC FOLLOW UP [facility] PDC [facility] None   4/1/2025 10:30 AM Valerie Martins MD [facility]          This note has been electronically signed.    Valerie Martins MD  March 7, 2025

## 2025-03-07 NOTE — NON STRESS TEST
NST Read  Reason for test: GHTN, FGR, threatened  labor   Time frame:   Baseline 135 bpm, moderate variability, + accelerations, no decelerations  Reactive NST     No contractions     Valerie Martins MD  Obstetrics and Gynecology  [facility]

## 2025-03-07 NOTE — PROGRESS NOTES
[facility]  Huyen Interiano  : 2000  MRN: [id number]  CSN: [id number]    Hospital Day: 2    CC: hospital follow-up for threatened  labor     Antepartum Progress Note    Subjective   Huyen Interiano is a 24 y.o.  at 32w0d who is currently admitted for threatened  labor. Pregnancy complicated by FGR with AC 8th percentile (EFW 29th 3/4/25) with normal Dopplers, GHTN, maternal tachycardia, short interval pregnancy. She was admitted for abdominal pain and cervical changed from closed -> 1 -2 cm dilation. She received BTMS 2/3-4 and received first dose of rescue course yesterday. She was started on magnesium for tocolysis and neuroprotection yesterday afternoon.     She reports that her contractions stopped about 1 hour into the magnesium and she has not had any abdominal pain or cramping since then. She is feeling much better. No bleeding, leakage of fluid. Baby is moving well for her. She has childcare issues and states that she really needs to go home today if possible.         Review of Systems   Constitutional:  Negative for fever.   Eyes:  Negative for visual disturbance.   Respiratory:  Negative for shortness of breath.    Cardiovascular:  Negative for chest pain.   Gastrointestinal:  Negative for abdominal pain and nausea.   Genitourinary:  Negative for vaginal bleeding.   Neurological:  Negative for headaches.          Objective       Patient Vitals for the past 24 hrs:   BP Temp Temp src Pulse Resp SpO2 Height Weight   25 0734 104/56 -- -- 100 16 97 % -- --   25 0646 121/66 -- -- 107 16 -- -- --   25 0540 108/63 -- -- 108 16 97 % -- --   25 0427 111/54 98.4 °F (36.9 °C) -- 103 16 98 % -- --   25 0329 119/67 -- -- 114 16 96 % -- --   25 0246 121/62 -- -- 109 16 -- -- --   25 0121 121/60 98.4 °F (36.9 °C) Oral 113 16 98 % -- --   25 2314 125/63 -- -- 120 16 96 % -- --   03/06/25 2239 134/69 -- -- (!) 121 16 99 % -- --  "  25 2143 133/74 -- -- (!) 123 16 97 % -- --   25 2036 133/79 -- -- (!) 127 16 98 % -- --   25 1938 133/74 98.2 °F (36.8 °C) -- (!) 131 16 96 % -- --   25 1903 130/67 -- -- (!) 125 -- -- -- --   25 1803 134/70 98.2 °F (36.8 °C) Oral (!) 126 -- 97 % -- --   25 1708 131/80 -- -- (!) 123 -- 99 % -- --   25 1555 121/67 -- -- (!) 122 18 98 % -- --   25 1452 -- -- -- 106 -- 98 % -- --   25 1437 -- -- -- 114 -- 98 % -- --   25 1436 129/75 98.1 °F (36.7 °C) Oral 115 -- -- -- --   25 1235 124/83 98.8 °F (37.1 °C) Oral 108 18 98 % 162.6 cm (64.02\") 79.4 kg (175 lb)         General: well developed; well nourished  no acute distress  mentation appropriate   Heart: Not performed.   Lungs: breathing is unlabored   Abdomen: fundus firm and non-tender   FHT's: reassuring and category 1 yesterday.    Cervix: was not checked.   Contractions: none overnight. Regular contractions stopped around 2 pm yesterday                Assessment   IUP at 32w0d  Threatened  labor   FGR (AC 8th, EFW 29th) with normal DFS (3/4/25)  GHTN without severe features   Maternal tachycardia controlled with labetalol 100 mg TID      Plan   Finish rescue steroid course: First course 2/3-, second course 3/6. Due foe second dose today around 2 pm.   Continue magnesium sulfate through second dose of steroid. Can discontinue magnesium after second steroid shot. Discussed that we will monitor symptoms and tocometer to see if she is having contractions after magnesium is stopped.   If no contractions after magnesium is stopped, then can plan for discharge home this evening. If yanira, then will plan to keep inpatient. She expresses understanding.       Valerie Martins MD  3/7/2025  08:00 EST            " 28-Oct-2018 03:13

## 2025-03-07 NOTE — OUTREACH NOTE
[facility]  IP Antepartum    Current Estimated Gestational Age: 32w0d      Questions/Answers      Flowsheet Row Responses   Best Method for Contacting Cell   Support Person Present No   Community Resources/Benefits currently in use: WIC, HANDS   Understanding of diagnosis/reason for admission: Patient understands, no questions or concerns at this time   Follow-up with patient in: 03/28/25          [facility] with Huyen on the [facility] for [facility]L,  receiving Mag therapy and rescue steroids.  Her second dose of steroids will be later this afternoon and if no contractions or abdominal pain noted Dr Martins has agreed to discharge her home.      Huyen and her Fiance Jamey are struggling with childcare issues with their 9 month old child, which is also complicating her ability to attend her needed medical appointments.   Discussed with her the availability of Medicaid transportation through [employer]  providing a bus pass since [facility] precludes her son accompanying her to appointments.  We spoke about possible other options such as her Mother or MIL bringing her to appointments.  She states Jamey can not miss any more work, her Mother is starting a new job and her MIL has health issues and is often unavailable to help with childcare or transportation.  Provided with information for [employer] and [employer] to assist with appointment transportation. Encouraged her to attend OB visits d/t high risk issues including IUGR, PTL, GHTN and Maternal tachycardia with this pregnancy. VU.     Will plan follow up around 35 weeks to confirm she has everything she needs and wellness check in.     Lisa Ash RN  Maternity Nurse Navigator    3/7/2025, 13:30 EST

## 2025-03-12 ENCOUNTER — ROUTINE PRENATAL (OUTPATIENT)
Dept: OBSTETRICS AND GYNECOLOGY | Facility: CLINIC | Age: 25
End: 2025-03-12
Payer: MEDICAID

## 2025-03-12 VITALS — DIASTOLIC BLOOD PRESSURE: 78 MMHG | SYSTOLIC BLOOD PRESSURE: 126 MMHG | WEIGHT: 171.2 LBS | BODY MASS INDEX: 29.37 KG/M2

## 2025-03-12 DIAGNOSIS — O09.891 SHORT INTERVAL BETWEEN PREGNANCIES AFFECTING PREGNANCY IN FIRST TRIMESTER, ANTEPARTUM: ICD-10-CM

## 2025-03-12 DIAGNOSIS — O13.2 GESTATIONAL HYPERTENSION WITHOUT SIGNIFICANT PROTEINURIA IN SECOND TRIMESTER: ICD-10-CM

## 2025-03-12 DIAGNOSIS — O09.93 SUPERVISION OF HIGH RISK PREGNANCY IN THIRD TRIMESTER: Primary | ICD-10-CM

## 2025-03-12 LAB
GLUCOSE UR STRIP-MCNC: NEGATIVE MG/DL
PROT UR STRIP-MCNC: NEGATIVE MG/DL

## 2025-03-12 NOTE — PROGRESS NOTES
Chief Complaint   Patient presents with    Routine Prenatal Visit     NST started @1:49 / some cramping and back pain that comes and goes. Hands and feet have swelling (sometimes wakes up with them swollen, sometimes it happens throughout the day).       Huyen Interiano is a 24 y.o.  at 32w5d who presents for follow up prenatal visit. Patient was admitted to antepartum on 25-25 for known FGR and new onset gestational hypertension, readmitted 3/6-3/7 for threatened  labor and received rescue course of steroids. She had repeat PDC scan that demonstrated EFW 29th percentile, AC 8th percentile with normal Dopplers.     Today she is feeling okay. Having some cramping this week, but does not feel as strong as previous contractions. No bleeding, leakage of fluid, but has noticed some increased vaginal discharge. She has not had any headaches, chest pain, shortness of breath.  She is taking BP medication without problems. Reports normal BP at home. Does have some swelling in hands and face.     Pregnancy is complicated by:  - Gestational hypertension - on labetalol 100 mg TID   - FGR with history of elevated Dopplers - resolved on most recent scan, received BTMS 2/3- and 3/6-7  - Short interval pregnancy - delivered 2024  - History of GHTN last pregnancy (2024) - heart palpitations with ASA   - Factor V Leiden heterozygous   - Anxiety/depression    /78   Wt 77.7 kg (171 lb 3.2 oz)   LMP 2024   BMI 29.37 kg/m²    Gen: NAD   Abdomen: soft, nontender, gravid  Pelvic: NEFG, normal physiologic discharge, neg pooling, neg ferning, neg efflux with valsalva, neg nitrazine. Cervix unchanged, /-3    ASSESSMENT/PLAN       Gestational hypertension   - Diagnosed on antepartum stay 2/3- and started on labetalol 100 mg BID    - Plan for weekly visits with weekly labs - patient to go to lab after visit today   - Continue  testing. Due to transportation issues, patient does  not think that she will be able to make twice weekly testing. We will plan to do NST in office even if on same day as BPP    - Growth US as below   - Delivery by 37 weeks or sooner pending FGR/BP     Fetal growth restriction by AC 5th percentile  - Received BTMS 2/3-4  - Elevated Dopplers on 2/2 but resolved on 2/6 - normal Dopplers on most recent scan (3/4)   - EFW normal AC 8th percentile (3/4)  - Weekly BPP and [facility] Dopplers at [facility]   - Repeat growth scan in 2 weeks      Routine prenatal care   - Prenatal labs normal  - Genetic testing reviewed: NIPT low risk    - Continue prenatal vitamin  - Prenatal anatomy ultrasound - completed, normal   - Influenza vaccine in pregnancy - declines  - 28w: GCT, RPR - normal. Declines Tdap    - 36 w: GBS testing      Rh negative    - rhogam given in office 10/1 given history of bleeding and Rh negative status    - Rhogam at 28 weeks - given 2/11/25      Factor V Leiden heterozygous    - Was on anticoagulation x 6 weeks postpartum in last pregnancy (switched from Lovenox to Xarelto due to allergy). Will plan for xarelto x 6 weeks postpartum if not nursing this postpartum period.    - ED visit 9/30 - no PE    - Hematology consult 11/13 - recommended anticoagulation postpartum only if has C/S      Short interval pregnancy   - Will plan for POP in immediate postpartum period to prevent another short interval pregnancy. Patient is afraid of needles/shots.   - She is also potentially interested in sterilization.       NST Read  Reason for exam : ABDONTN, FGR   Time frame: 3922-1155  Baseline 145 bpm, moderate variability, + accelerations, no decelerations   Reactive NST     Follow up in 1 week    This note was electronically signed.    Valerie Martins MD  Obstetrics and Gynecology  [facility]

## 2025-03-28 ENCOUNTER — PATIENT OUTREACH (OUTPATIENT)
Dept: LABOR AND DELIVERY | Facility: HOSPITAL | Age: 25
End: 2025-03-28
Payer: MEDICAID

## 2025-03-28 NOTE — OUTREACH NOTE
"[facility]  Check-In    Current Estimated Gestational Age: 35w0d      Questions/Answers      Flowsheet Row Responses   Best Method for Contacting Cell   Demographics Reviewed Yes   Currently Employed No   Able to keep appointments as scheduled Yes   Gender(s) and Name(s) Boy-Micah   Baby Active/Feeling Fetal Movemen Yes   How are you presently feeling? \"I feel good\"   Are you having any of the following symptoms? Contractions   Questions regarding prenatal visits or tests to be ordered? No   Supplies ready for baby Clothing, Crib, Diapers, Feeding Supplies   Resource/Environmental Concerns Financial, Reliable Transportation   Do you have any questions related to your care experience, your pregnancy, plans for delivery, any concerns, etc? No   Other Education Insurance benefits/Incentives, Meds to Beds, Transportation Assistance          Feeling well and reports good fetal movement. Discussed what to expect when she goes in to L&D including admission process, induction process, comfort measures, positioning, pain medication/Fentanyl, epidural placement, delivery, skin to skin, breastfeeding, post-delivery, and MB care. HUNTER. Reports Dr Martins has made the statement she will be delivering around 37 wks d/t to pregnancy complications.     PN EPDS scored 11 today. Dr Martins notified via [employer] message. Offered referral for counseling and discussed restarting her previous RX for MH meds. Huyen declined these options. Encouraged her to reach out to Dr Martins or myself if her decision changes. VU.     She is feeling ready for delivery. She has enrolled in [facility] and feels like she has everything except a car seat she will need for baby and they will be acquiring it this weekend. Encouraged her to contact [facility] concerning a new breast pump since the one she had with her delivery 9 months ago has recently stopped working.     Updated resources provided via [employer] message. Contact information provided. Encouraged to call " with questions, concerns, or for support. Will plan to see inpatient after delivery.    Lisa Ash RN  Maternity Nurse Navigator    3/28/2025, 10:25 EDT

## 2025-04-01 ENCOUNTER — HOSPITAL ENCOUNTER (OUTPATIENT)
Dept: WOMENS IMAGING | Facility: HOSPITAL | Age: 25
Discharge: HOME | End: 2025-04-01
Admitting: OBSTETRICS & GYNECOLOGY
Payer: MEDICAID

## 2025-04-01 ENCOUNTER — ROUTINE PRENATAL (OUTPATIENT)
Dept: OBSTETRICS AND GYNECOLOGY | Facility: CLINIC | Age: 25
End: 2025-04-01
Payer: MEDICAID

## 2025-04-01 ENCOUNTER — OFFICE VISIT (OUTPATIENT)
Dept: OBSTETRICS AND GYNECOLOGY | Facility: HOSPITAL | Age: 25
End: 2025-04-01
Payer: MEDICAID

## 2025-04-01 ENCOUNTER — HOSPITAL ENCOUNTER (OUTPATIENT)
Facility: HOSPITAL | Age: 25
Discharge: HOME | End: 2025-04-01
Attending: STUDENT IN AN ORGANIZED HEALTH CARE EDUCATION/TRAINING PROGRAM | Admitting: STUDENT IN AN ORGANIZED HEALTH CARE EDUCATION/TRAINING PROGRAM
Payer: MEDICAID

## 2025-04-01 VITALS — BODY MASS INDEX: 30.02 KG/M2 | WEIGHT: 175 LBS

## 2025-04-01 VITALS — DIASTOLIC BLOOD PRESSURE: 72 MMHG | BODY MASS INDEX: 30.16 KG/M2 | WEIGHT: 175.8 LBS | SYSTOLIC BLOOD PRESSURE: 125 MMHG

## 2025-04-01 VITALS — SYSTOLIC BLOOD PRESSURE: 135 MMHG | DIASTOLIC BLOOD PRESSURE: 84 MMHG

## 2025-04-01 DIAGNOSIS — R03.0 ELEVATED BP WITHOUT DIAGNOSIS OF HYPERTENSION: ICD-10-CM

## 2025-04-01 DIAGNOSIS — O09.891 SHORT INTERVAL BETWEEN PREGNANCIES AFFECTING PREGNANCY IN FIRST TRIMESTER, ANTEPARTUM: ICD-10-CM

## 2025-04-01 DIAGNOSIS — O36.5990 FETAL GROWTH RESTRICTION ANTEPARTUM: Primary | ICD-10-CM

## 2025-04-01 DIAGNOSIS — Z87.59 HISTORY OF GESTATIONAL HYPERTENSION: ICD-10-CM

## 2025-04-01 DIAGNOSIS — Z34.90 PREGNANCY, UNSPECIFIED GESTATIONAL AGE: ICD-10-CM

## 2025-04-01 DIAGNOSIS — O36.5990 FETAL GROWTH RESTRICTION ANTEPARTUM: ICD-10-CM

## 2025-04-01 DIAGNOSIS — O13.2 GESTATIONAL HYPERTENSION WITHOUT SIGNIFICANT PROTEINURIA IN SECOND TRIMESTER: ICD-10-CM

## 2025-04-01 DIAGNOSIS — O09.93 SUPERVISION OF HIGH RISK PREGNANCY IN THIRD TRIMESTER: ICD-10-CM

## 2025-04-01 LAB
ALP SERPL-CCNC: 105 U/L (ref 39–117)
ALT SERPL W P-5'-P-CCNC: 8 U/L (ref 1–33)
AST SERPL-CCNC: 16 U/L (ref 1–32)
BILIRUB SERPL-MCNC: <0.2 MG/DL (ref 0–1.2)
CREAT SERPL-MCNC: 0.53 MG/DL (ref 0.57–1)
EXTERNAL GROUP B STREP ANTIGEN: NOT DETECTED
GLUCOSE UR STRIP-MCNC: NEGATIVE MG/DL
LDH SERPL-CCNC: 170 U/L (ref 135–214)
PROT UR STRIP-MCNC: NEGATIVE MG/DL
URATE SERPL-MCNC: 3.5 MG/DL (ref 2.4–5.7)

## 2025-04-01 PROCEDURE — 82565 ASSAY OF CREATININE: CPT | Performed by: STUDENT IN AN ORGANIZED HEALTH CARE EDUCATION/TRAINING PROGRAM

## 2025-04-01 PROCEDURE — 76816 OB US FOLLOW-UP PER FETUS: CPT

## 2025-04-01 PROCEDURE — 84075 ASSAY ALKALINE PHOSPHATASE: CPT | Performed by: STUDENT IN AN ORGANIZED HEALTH CARE EDUCATION/TRAINING PROGRAM

## 2025-04-01 PROCEDURE — 82247 BILIRUBIN TOTAL: CPT | Performed by: STUDENT IN AN ORGANIZED HEALTH CARE EDUCATION/TRAINING PROGRAM

## 2025-04-01 PROCEDURE — 83615 LACTATE (LD) (LDH) ENZYME: CPT | Performed by: STUDENT IN AN ORGANIZED HEALTH CARE EDUCATION/TRAINING PROGRAM

## 2025-04-01 PROCEDURE — G0463 HOSPITAL OUTPT CLINIC VISIT: HCPCS

## 2025-04-01 PROCEDURE — 36415 COLL VENOUS BLD VENIPUNCTURE: CPT | Performed by: STUDENT IN AN ORGANIZED HEALTH CARE EDUCATION/TRAINING PROGRAM

## 2025-04-01 PROCEDURE — 76819 FETAL BIOPHYS PROFIL W/O NST: CPT

## 2025-04-01 PROCEDURE — 84460 ALANINE AMINO (ALT) (SGPT): CPT | Performed by: STUDENT IN AN ORGANIZED HEALTH CARE EDUCATION/TRAINING PROGRAM

## 2025-04-01 PROCEDURE — 76820 UMBILICAL ARTERY ECHO: CPT

## 2025-04-01 PROCEDURE — 84450 TRANSFERASE (AST) (SGOT): CPT | Performed by: STUDENT IN AN ORGANIZED HEALTH CARE EDUCATION/TRAINING PROGRAM

## 2025-04-01 PROCEDURE — 84550 ASSAY OF BLOOD/URIC ACID: CPT | Performed by: STUDENT IN AN ORGANIZED HEALTH CARE EDUCATION/TRAINING PROGRAM

## 2025-04-01 RX ORDER — LABETALOL 100 MG/1
100 TABLET, FILM COATED ORAL EVERY 8 HOURS SCHEDULED
Qty: 90 TABLET | Refills: 3 | Status: SHIPPED | OUTPATIENT
Start: 2025-04-01
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
## 2025-04-01 NOTE — LETTER
"2025     Trenton Mars MD  [address]  [facility] [address]    Patient: Huyen Interiano   YOB: 2000   Date of Visit: 2025     Dear Trenton Mars MD:       Thank you for referring Huyen Interiano to me for evaluation. Below are the relevant portions of my assessment and plan of care.    If you have questions, please do not hesitate to call me. I look forward to following Huyen along with you.         Sincerely,        Douglas A. Milligan, MD        CC: No Recipients    Milligan, Douglas A, MD  25 [phone]  Sign when Signing Visit  Documentation of the ultrasound findings, images, and interpretations will be available in the patient's Viewpoint report which is located in the imaging tab in chart review.    Maternal/Fetal Medicine Follow Up  Note     Name: Huyen Interiano    : 2000     MRN: [id number]     Referring Provider: Trenton Mars MD    Chief Complaint  IUGR; GHTN; elevated UA dopplers; Factor V Leiden    Subjective     History of Present Illness:  Huyen Interiano is a 24 y.o.  35w4d who presents today for IUGR    VELMA: Estimated Date of Delivery: 25     ROS:   As noted in HPI.     Objective     Vital Signs  /72   Wt 79.7 kg (175 lb 12.8 oz)   LMP 2024   Estimated body mass index is 30.16 kg/m² as calculated from the following:    Height as of 3/6/25: 162.6 cm (64.02\").    Weight as of this encounter: 79.7 kg (175 lb 12.8 oz).    Physical Exam    Ultrasound Impression:   See Viewpoint    Assessment and Plan     Huyen Interiano is a 24 y.o.  35w4d who presents today for IUGR    Diagnoses and all orders for this visit:    1. Fetal growth restriction antepartum (Primary)  Assessment & Plan:  Patient returns today for pregnancy complicated by abdominal circumference lag.  Patient additionally carries a diagnosis of gestational hypertension.  Patient reports decreased fetal movement since last " night patient's last scan in our office demonstrated overall growth at the 29th percentile with the abdominal circumference at the 8th percentile.    Ultrasound today demonstrates a fetus with overall growth at approximately 14th percentile and abdominal circumference at the 56 percentile.  Amniotic fluid volume and umbilical artery Dopplers are normal.  BPP is 8 out of 8.    Patient appears to have slightly worsening intrauterine growth restriction.  As her estimated weight is greater than the 10th percentile and abdominal circumference is greater than the 5th percentile we ordinarily would consider continue the pregnancy to 38 to 39 weeks gestation if no other complications arise.  As the patient has a diagnosis of gestational hypertension the recommendation for this condition with or without growth restriction is delivery at 37 weeks gestation.  Consequently we would concur with the plan to deliver the patient 37 weeks gestation for gestational hypertension.  We would recommend continuing twice-weekly  testing until delivery.    Patient was counseled extensively regarding fetal movement will contact her provider immediately if she notices any decreased fetal movement.      2. Pregnancy, unspecified gestational age         Follow Up  No follow-ups on file.    I spent 20 minutes caring for the patient on the day of service. This included: obtaining or reviewing a separately obtained medical history, reviewing patient records, performing a medically appropriate exam and/or evaluation, counseling or educating the patient/family/caregiver, ordering medications, labs, and/or procedures and documenting such in the medical record. This does not include time spent on review and interpretation of other tests such as fetal ultrasound or the performance of other procedures such as amniocentesis or CVS.      Douglas A. Milligan, MD  Maternal Fetal Medicine, [facility] [place]      04/01/2025

## 2025-04-01 NOTE — PROGRESS NOTES
Patient denies any leaking fluid or vaginal bleeding. She reports irregular contractions  NIPT low risk  Patient reports next follow-up appointment with Dr. Martins' office is today.

## 2025-04-01 NOTE — PROGRESS NOTES
Chief Complaint   Patient presents with    Routine Prenatal Visit     Saw PDC today / NST started @ 10:28 / has been having contractions that will last a couple hours, and then will go away.They started back this morning and she is starting to have some discharge that has some blood in it / Did not feel baby move since last night until this morning during the ultrasound       Huyen Interiano is a 24 y.o.  at 35w4d  who presents for follow up prenatal visit. Patient was admitted to antepartum on 25-25 for known FGR and new onset gestational hypertension, readmitted 3/6-3/7 for threatened  labor and received rescue course of steroids. She had repeat PDC scan today that demonstrated EFW 15th percentile, AC 7th percentile with normal Dopplers.     Today she is feeling okay. Feeling contractions that will come and go for an hour or two. Having contractions this morning as well as some increased discharge. Feels like strong period cramps every 2-3 minutes. Is now feeling baby move, but was not feeling as much last night.     She has not had any headaches, chest pain, shortness of breath.  She is taking BP medication without problems. Not taking BP measurements at home.       Pregnancy is complicated by:  - Gestational hypertension - on labetalol 100 mg TID   - FGR with history of elevated Dopplers - normal Dopplers today, received BTMS 2/3- and 3/6-7  - Short interval pregnancy - delivered 2024  - History of GHTN last pregnancy (2024) - heart palpitations with ASA   - Factor V Leiden heterozygous   - Anxiety/depression    Wt 79.4 kg (175 lb)   LMP 2024   BMI 30.02 kg/m²  BP normal at PDC today    Gen: NAD   Abdomen: soft, nontender, gravid  Pelvic: Normal external female genitalia, normal physiologic discharge. Cervix 3/60/-2     ASSESSMENT/PLAN       Gestational hypertension   - Diagnosed on antepartum stay 2/3- and started on labetalol 100 mg TID    - Continue   testing. Due to transportation issues, patient does not think that she will be able to make twice weekly testing. We will plan to do NST in office even if on same day as BPP    - Growth US as below   - Delivery by 37 weeks     Fetal growth restriction by AC 7th percentile  - Received BTMS 2/3-4  - Elevated Dopplers on  but resolved on  - normal Dopplers on most recent scan today   - Weekly BPP and UA Dopplers at [facility]   - Will schedule IOL at 37 weeks if undelivered       labor   - Contractions every 2-3 minutes on NST   - Cervix more dilated than last visit -- 3 cm. Will send to triage today     Routine prenatal care   - Prenatal labs normal  - Genetic testing reviewed: NIPT low risk    - Continue prenatal vitamin  - Prenatal anatomy ultrasound - completed, normal   - Influenza vaccine in pregnancy - declines  - 28w: GCT, RPR - normal. Declines Tdap    - GBS testing today     Rh negative    - rhogam given in office 10/1 given history of bleeding and Rh negative status    - Rhogam at 28 weeks - given 25      Factor V Leiden heterozygous    - Was on anticoagulation x 6 weeks postpartum in last pregnancy (switched from Lovenox to Xarelto due to allergy). Will plan for xarelto x 6 weeks postpartum if not nursing this postpartum period.    - ED visit  - no PE    - Hematology consult  - recommended anticoagulation postpartum only if has C/S      Short interval pregnancy   - Will plan for POP in immediate postpartum period to prevent another short interval pregnancy. Patient is afraid of needles/shots.       NST Read  Reason for exam : ABDONTN, FGR   Time frame: 6278-2654  Baseline 135 bpm, moderate variability, + accelerations, no decelerations   Contractions q 2-3 minutes   Reactive NST     To triage     This note was electronically signed.    Valerie Martins MD  Obstetrics and Gynecology  [facility]

## 2025-04-01 NOTE — ASSESSMENT & PLAN NOTE
Patient returns today for pregnancy complicated by abdominal circumference lag.  Patient additionally carries a diagnosis of gestational hypertension.  Patient reports decreased fetal movement since last night patient's last scan in our office demonstrated overall growth at the 29th percentile with the abdominal circumference at the 8th percentile.    Ultrasound today demonstrates a fetus with overall growth at approximately 14th percentile and abdominal circumference at the 56 percentile.  Amniotic fluid volume and umbilical artery Dopplers are normal.  BPP is 8 out of 8.    Patient appears to have slightly worsening intrauterine growth restriction.  As her estimated weight is greater than the 10th percentile and abdominal circumference is greater than the 5th percentile we ordinarily would consider continue the pregnancy to 38 to 39 weeks gestation if no other complications arise.  As the patient has a diagnosis of gestational hypertension the recommendation for this condition with or without growth restriction is delivery at 37 weeks gestation.  Consequently we would concur with the plan to deliver the patient 37 weeks gestation for gestational hypertension.  We would recommend continuing twice-weekly  testing until delivery.    Patient was counseled extensively regarding fetal movement will contact her provider immediately if she notices any decreased fetal movement.

## 2025-04-01 NOTE — PROGRESS NOTES
"Documentation of the ultrasound findings, images, and interpretations will be available in the patient's Viewpoint report which is located in the imaging tab in chart review.    Maternal/Fetal Medicine Follow Up  Note     Name: Huyen Interiano    : 2000     MRN: [id number]     Referring Provider: Trenton Mars MD    Chief Complaint  IUGR; GHTN; elevated UA dopplers; Factor V Leiden    Subjective     History of Present Illness:  Huyen Interiano is a 24 y.o.  35w4d who presents today for IUGR    VELMA: Estimated Date of Delivery: 25     ROS:   As noted in HPI.     Objective     Vital Signs  /72   Wt 79.7 kg (175 lb 12.8 oz)   LMP 2024   Estimated body mass index is 30.16 kg/m² as calculated from the following:    Height as of 3/6/25: 162.6 cm (64.02\").    Weight as of this encounter: 79.7 kg (175 lb 12.8 oz).    Physical Exam    Ultrasound Impression:   See Viewpoint    Assessment and Plan     Huyen Interiano is a 24 y.o.  35w4d who presents today for IUGR    Diagnoses and all orders for this visit:    1. Fetal growth restriction antepartum (Primary)  Assessment & Plan:  Patient returns today for pregnancy complicated by abdominal circumference lag.  Patient additionally carries a diagnosis of gestational hypertension.  Patient reports decreased fetal movement since last night patient's last scan in our office demonstrated overall growth at the 29th percentile with the abdominal circumference at the 8th percentile.    Ultrasound today demonstrates a fetus with overall growth at approximately 14th percentile and abdominal circumference at the 56 percentile.  Amniotic fluid volume and umbilical artery Dopplers are normal.  BPP is 8 out of 8.    Patient appears to have slightly worsening intrauterine growth restriction.  As her estimated weight is greater than the 10th percentile and abdominal circumference is greater than the 5th percentile we ordinarily would " consider continue the pregnancy to 38 to 39 weeks gestation if no other complications arise.  As the patient has a diagnosis of gestational hypertension the recommendation for this condition with or without growth restriction is delivery at 37 weeks gestation.  Consequently we would concur with the plan to deliver the patient 37 weeks gestation for gestational hypertension.  We would recommend continuing twice-weekly  testing until delivery.    Patient was counseled extensively regarding fetal movement will contact her provider immediately if she notices any decreased fetal movement.      2. Pregnancy, unspecified gestational age         Follow Up  No follow-ups on file.    I spent 20 minutes caring for the patient on the day of service. This included: obtaining or reviewing a separately obtained medical history, reviewing patient records, performing a medically appropriate exam and/or evaluation, counseling or educating the patient/family/caregiver, ordering medications, labs, and/or procedures and documenting such in the medical record. This does not include time spent on review and interpretation of other tests such as fetal ultrasound or the performance of other procedures such as amniocentesis or CVS.      Douglas A. Milligan, MD  Maternal Fetal Medicine, [facility]     2025

## 2025-04-08 ENCOUNTER — ROUTINE PRENATAL (OUTPATIENT)
Dept: OBSTETRICS AND GYNECOLOGY | Facility: CLINIC | Age: 25
End: 2025-04-08
Payer: MEDICAID

## 2025-04-08 VITALS — WEIGHT: 174 LBS | SYSTOLIC BLOOD PRESSURE: 110 MMHG | DIASTOLIC BLOOD PRESSURE: 80 MMHG | BODY MASS INDEX: 29.85 KG/M2

## 2025-04-08 DIAGNOSIS — O36.5990 FETAL GROWTH RESTRICTION ANTEPARTUM: Primary | ICD-10-CM

## 2025-04-08 DIAGNOSIS — O13.2 GESTATIONAL HYPERTENSION WITHOUT SIGNIFICANT PROTEINURIA IN SECOND TRIMESTER: ICD-10-CM

## 2025-04-08 NOTE — PROGRESS NOTES
Chief Complaint   Patient presents with    Routine Prenatal Visit     No c/c       Huyen Interiano is a 24 y.o.  at 36w4d who presents for follow up prenatal visit. Patient was admitted to antepartum on 25-25 for known FGR and new onset gestational hypertension, readmitted 3/6-3/7 for threatened  labor and received rescue course of steroids.     Today she is feeling well. No contractions, bleeding, cramping. She has not had any headaches, chest pain, shortness of breath.  She is taking BP medication without problems. Not taking BP measurements at home.       Pregnancy is complicated by:  - Gestational hypertension - on labetalol 100 mg TID   - FGR with history of elevated Dopplers - received BTMS 2/3- and 3/6-  - Short interval pregnancy - delivered 2024  - History of GHTN last pregnancy (2024) - heart palpitations with ASA   - Factor V Leiden heterozygous   - Anxiety/depression    /80   Wt 78.9 kg (174 lb)   LMP 2024   BMI 29.85 kg/m²  BP normal at PDC today    Gen: NAD   Abdomen: soft, nontender, gravid  Pelvic: Cervix 3/60/-2     ASSESSMENT/PLAN       Gestational hypertension   - Diagnosed on antepartum stay 2/3- and started on labetalol 100 mg TID    -  testing   - Delivery scheduled at 37 week IOL -- needs to move to a weekend due to childcare issues     Fetal growth restriction by AC 7th percentile  - Received BTMS 2/3-  - Elevated Dopplers on  but resolved on  - normal Dopplers on most recent scan   - Delivery scheduled 37 weeks     Routine prenatal care   - Prenatal labs normal  - Genetic testing reviewed: NIPT low risk    - Continue prenatal vitamin  - Prenatal anatomy ultrasound - completed, normal   - Influenza vaccine in pregnancy - declines  - 28w: GCT, RPR - normal. Declines Tdap    - GBS testing negative      Factor V Leiden heterozygous    - Was on anticoagulation x 6 weeks postpartum in last pregnancy (switched from Lovenox to  Xarelto due to allergy). Will plan for xarelto x 6 weeks postpartum if not nursing this postpartum period.    - ED visit 9/30 - no PE    - Hematology consult 11/13 - recommended anticoagulation postpartum only if has C/S      Short interval pregnancy   - Will plan for POP in immediate postpartum period to prevent another short interval pregnancy. Patient is afraid of needles/shots.   - Desires sterilization after this pregnancy (interval sterilization)     This note was electronically signed.    Valerie Martins MD  Obstetrics and Gynecology  [facility]

## 2025-04-10 ENCOUNTER — ROUTINE PRENATAL (OUTPATIENT)
Dept: OBSTETRICS AND GYNECOLOGY | Facility: CLINIC | Age: 25
End: 2025-04-10
Payer: MEDICAID

## 2025-04-10 VITALS — DIASTOLIC BLOOD PRESSURE: 80 MMHG | WEIGHT: 176 LBS | SYSTOLIC BLOOD PRESSURE: 116 MMHG | BODY MASS INDEX: 30.2 KG/M2

## 2025-04-10 DIAGNOSIS — Z87.59 HISTORY OF GESTATIONAL HYPERTENSION: ICD-10-CM

## 2025-04-10 DIAGNOSIS — O09.41 SUPERVISION OF HIGH-RISK PREGNANCY WITH GRAND MULTIPARITY IN FIRST TRIMESTER: Primary | ICD-10-CM

## 2025-04-10 DIAGNOSIS — O13.2 GESTATIONAL HYPERTENSION WITHOUT SIGNIFICANT PROTEINURIA IN SECOND TRIMESTER: ICD-10-CM

## 2025-04-10 DIAGNOSIS — O36.5990 FETAL GROWTH RESTRICTION ANTEPARTUM: ICD-10-CM

## 2025-04-10 LAB
GLUCOSE UR STRIP-MCNC: NEGATIVE MG/DL
LEUKOCYTE EST, POC: ABNORMAL
NITRITE UR-MCNC: NEGATIVE MG/ML
PROT UR STRIP-MCNC: ABNORMAL MG/DL

## 2025-04-10 RX ORDER — OXYTOCIN/0.9 % SODIUM CHLORIDE 30/500 ML
2-20 PLASTIC BAG, INJECTION (ML) INTRAVENOUS
Status: CANCELLED | OUTPATIENT
Start: 2025-04-10

## 2025-04-10 RX ORDER — ACETAMINOPHEN 325 MG/1
650 TABLET ORAL EVERY 4 HOURS PRN
Status: CANCELLED | OUTPATIENT
Start: 2025-04-10

## 2025-04-10 RX ORDER — ONDANSETRON 4 MG/1
4 TABLET, ORALLY DISINTEGRATING ORAL EVERY 6 HOURS PRN
Status: CANCELLED | OUTPATIENT
Start: 2025-04-10

## 2025-04-10 RX ORDER — IBUPROFEN 200 MG
600 TABLET ORAL EVERY 6 HOURS PRN
Status: CANCELLED | OUTPATIENT
Start: 2025-04-10

## 2025-04-10 RX ORDER — MISOPROSTOL 100 UG/1
800 TABLET ORAL AS NEEDED
Status: CANCELLED | OUTPATIENT
Start: 2025-04-10 | End: 2025-04-11

## 2025-04-10 RX ORDER — PROMETHAZINE HYDROCHLORIDE 25 MG/1
12.5 TABLET ORAL EVERY 6 HOURS PRN
Status: CANCELLED | OUTPATIENT
Start: 2025-04-10

## 2025-04-10 RX ORDER — METHYLERGONOVINE MALEATE 0.2 MG/ML
200 INJECTION INTRAVENOUS ONCE AS NEEDED
Status: CANCELLED | OUTPATIENT
Start: 2025-04-10 | End: 2025-04-11

## 2025-04-10 RX ORDER — SODIUM CHLORIDE 0.9 % (FLUSH) 0.9 %
10 SYRINGE (ML) INJECTION AS NEEDED
Status: CANCELLED | OUTPATIENT
Start: 2025-04-10

## 2025-04-10 RX ORDER — CARBOPROST TROMETHAMINE 250 UG/ML
250 INJECTION, SOLUTION INTRAMUSCULAR AS NEEDED
Status: CANCELLED | OUTPATIENT
Start: 2025-04-10 | End: 2025-04-11

## 2025-04-10 RX ORDER — PROMETHAZINE HYDROCHLORIDE 25 MG/1
12.5 SUPPOSITORY RECTAL EVERY 6 HOURS PRN
Status: CANCELLED | OUTPATIENT
Start: 2025-04-10

## 2025-04-10 RX ORDER — OXYTOCIN/0.9 % SODIUM CHLORIDE 30/500 ML
250 PLASTIC BAG, INJECTION (ML) INTRAVENOUS CONTINUOUS
Status: CANCELLED | OUTPATIENT
Start: 2025-04-10 | End: 2025-04-10

## 2025-04-10 RX ORDER — ONDANSETRON 2 MG/ML
4 INJECTION INTRAMUSCULAR; INTRAVENOUS EVERY 6 HOURS PRN
Status: CANCELLED | OUTPATIENT
Start: 2025-04-10

## 2025-04-10 RX ORDER — MAGNESIUM CARB/ALUMINUM HYDROX 105-160MG
30 TABLET,CHEWABLE ORAL ONCE
Status: CANCELLED | OUTPATIENT
Start: 2025-04-10 | End: 2025-04-10

## 2025-04-10 RX ORDER — OXYTOCIN/0.9 % SODIUM CHLORIDE 30/500 ML
999 PLASTIC BAG, INJECTION (ML) INTRAVENOUS ONCE
Status: CANCELLED | OUTPATIENT
Start: 2025-04-10

## 2025-04-10 RX ORDER — BUTORPHANOL TARTRATE 1 MG/ML
2 INJECTION, SOLUTION INTRAMUSCULAR; INTRAVENOUS
Status: CANCELLED | OUTPATIENT
Start: 2025-04-10

## 2025-04-10 RX ORDER — BUTORPHANOL TARTRATE 1 MG/ML
1 INJECTION, SOLUTION INTRAMUSCULAR; INTRAVENOUS
Status: CANCELLED | OUTPATIENT
Start: 2025-04-10

## 2025-04-10 RX ORDER — OXYCODONE AND ACETAMINOPHEN 5; 325 MG/1; MG/1
1 TABLET ORAL EVERY 4 HOURS PRN
Refills: 0 | Status: CANCELLED | OUTPATIENT
Start: 2025-04-10 | End: 2025-04-20

## 2025-04-10 RX ORDER — SODIUM CHLORIDE 9 MG/ML
40 INJECTION, SOLUTION INTRAVENOUS AS NEEDED
Status: CANCELLED | OUTPATIENT
Start: 2025-04-10

## 2025-04-10 RX ORDER — SODIUM CHLORIDE, SODIUM LACTATE, POTASSIUM CHLORIDE, CALCIUM CHLORIDE 600; 310; 30; 20 MG/100ML; MG/100ML; MG/100ML; MG/100ML
125 INJECTION, SOLUTION INTRAVENOUS CONTINUOUS
Status: CANCELLED | OUTPATIENT
Start: 2025-04-10 | End: 2025-04-11

## 2025-04-10 RX ORDER — SODIUM CHLORIDE 0.9 % (FLUSH) 0.9 %
10 SYRINGE (ML) INJECTION EVERY 12 HOURS SCHEDULED
Status: CANCELLED | OUTPATIENT
Start: 2025-04-10

## 2025-04-10 RX ORDER — LIDOCAINE HYDROCHLORIDE 10 MG/ML
0.5 INJECTION, SOLUTION EPIDURAL; INFILTRATION; INTRACAUDAL; PERINEURAL ONCE AS NEEDED
Status: CANCELLED | OUTPATIENT
Start: 2025-04-10

## 2025-04-10 NOTE — PROGRESS NOTES
Chief Complaint   Patient presents with    Routine Prenatal Visit     BPP done today / NST started @ / Was told during the ultrasound that he did not do the practice breathing, and wanted to know more about that.       Huyen Interiano is a 24 y.o.  at 36w6d who presents for follow up prenatal visit. Patient was admitted to antepartum on 25-25 for known FGR and new onset gestational hypertension, readmitted 3/6-3/7 for threatened  labor and received rescue course of steroids.     Today she is feeling well. No contractions, bleeding, cramping. She has not had any headaches, chest pain, shortness of breath.  Baby is moving well. She is planning on IOL tonight     Pregnancy is complicated by:  - Gestational hypertension - on labetalol 100 mg TID   - FGR with history of elevated Dopplers - received BTMS 2/3- and 3/6-  - Short interval pregnancy - delivered 2024  - History of GHTN last pregnancy (2024) - heart palpitations with ASA   - Factor V Leiden heterozygous   - Anxiety/depression    /80   Wt 79.8 kg (176 lb)   LMP 2024   BMI 30.20 kg/m²     Gen: NAD   Abdomen: soft, nontender, gravid  Pelvic: deferred    ASSESSMENT/PLAN       Gestational hypertension   - Diagnosed on antepartum stay 2/3- and started on labetalol 100 mg TID    -  testing   - Delivery scheduled tomorrow     Fetal growth restriction by AC 7th percentile  - Received BTMS 2/3-  - Elevated Dopplers on  but resolved on  - normal Dopplers on most recent scan   - Delivery scheduled 37 weeks     Routine prenatal care   - Prenatal labs normal  - Genetic testing reviewed: NIPT low risk    - Continue prenatal vitamin  - Prenatal anatomy ultrasound - completed, normal   - Influenza vaccine in pregnancy - declines  - 28w: GCT, RPR - normal. Declines Tdap    - GBS testing negative      Factor V Leiden heterozygous    - Was on anticoagulation x 6 weeks postpartum in last pregnancy (switched  from Lovenox to Xarelto due to allergy).    - ED visit 9/30 - no PE    - Hematology consult 11/13 - recommended anticoagulation postpartum only if has C/S      Short interval pregnancy   - Will plan for POP in immediate postpartum period to prevent another short interval pregnancy. Patient is afraid of needles/shots.   - Desires sterilization after this pregnancy (interval sterilization)     NST Read  Reason for test: BPP 6/8 (-Breathing), GHTN, FGR   Time frame: 0910-7194  Baseline 135 bpm, moderate variability, + accelerations, one variable deceleration, reassuring x 15 minutes after   Reactive NST     IOL tonight     This note was electronically signed.    Valerie Martins MD  Obstetrics and Gynecology  [facility]
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
## 2025-04-10 NOTE — LETTER
To whom it may concern:     Huyen Interiano is currently pregnant and was seen in the office 4/10/2025. Her partner, Jamey Dela Cruz, accompanied her to this appointment. She will be undergoing induction for planned delivery on 4/11/2025. Please excuse her partner from work for this time period.       Please call our office with any questions.     Valerie Martins MD  Obstetrics and Gynecology  [facility]

## 2025-04-11 ENCOUNTER — HOSPITAL ENCOUNTER (INPATIENT)
Facility: HOSPITAL | Age: 25
LOS: 2 days | Discharge: HOME | End: 2025-04-13
Attending: STUDENT IN AN ORGANIZED HEALTH CARE EDUCATION/TRAINING PROGRAM | Admitting: STUDENT IN AN ORGANIZED HEALTH CARE EDUCATION/TRAINING PROGRAM
Payer: MEDICAID

## 2025-04-11 ENCOUNTER — ANESTHESIA (OUTPATIENT)
Dept: LABOR AND DELIVERY | Facility: HOSPITAL | Age: 25
End: 2025-04-11
Payer: MEDICAID

## 2025-04-11 ENCOUNTER — HOSPITAL ENCOUNTER (OUTPATIENT)
Dept: LABOR AND DELIVERY | Facility: HOSPITAL | Age: 25
Discharge: HOME | End: 2025-04-11
Payer: MEDICAID

## 2025-04-11 ENCOUNTER — ANESTHESIA EVENT (OUTPATIENT)
Dept: LABOR AND DELIVERY | Facility: HOSPITAL | Age: 25
End: 2025-04-11
Payer: MEDICAID

## 2025-04-11 DIAGNOSIS — O13.2 GESTATIONAL HYPERTENSION WITHOUT SIGNIFICANT PROTEINURIA IN SECOND TRIMESTER: ICD-10-CM

## 2025-04-11 PROBLEM — O13.9 GESTATIONAL HYPERTENSION: Status: ACTIVE | Noted: 2025-04-11

## 2025-04-11 LAB
ABO GROUP BLD: NORMAL
ALBUMIN SERPL-MCNC: 3.6 G/DL (ref 3.5–5.2)
ALBUMIN/GLOB SERPL: 1.4 G/DL
ALP SERPL-CCNC: 132 U/L (ref 39–117)
ALT SERPL W P-5'-P-CCNC: 8 U/L (ref 1–33)
ANION GAP SERPL CALCULATED.3IONS-SCNC: 12 MMOL/L (ref 5–15)
AST SERPL-CCNC: 15 U/L (ref 1–32)
BILIRUB SERPL-MCNC: <0.2 MG/DL (ref 0–1.2)
BLD GP AB SCN SERPL QL: NEGATIVE
BUN SERPL-MCNC: 10 MG/DL (ref 6–20)
BUN/CREAT SERPL: 21.3 (ref 7–25)
CALCIUM SPEC-SCNC: 8.9 MG/DL (ref 8.6–10.5)
CHLORIDE SERPL-SCNC: 107 MMOL/L (ref 98–107)
CO2 SERPL-SCNC: 20 MMOL/L (ref 22–29)
CREAT SERPL-MCNC: 0.47 MG/DL (ref 0.57–1)
CREAT UR-MCNC: 131.7 MG/DL
DEPRECATED RDW RBC AUTO: 41.2 FL (ref 37–54)
EGFRCR SERPLBLD CKD-EPI 2021: 138.2 ML/MIN/1.73
ERYTHROCYTE [DISTWIDTH] IN BLOOD BY AUTOMATED COUNT: 13.9 % (ref 12.3–15.4)
GLOBULIN UR ELPH-MCNC: 2.6 GM/DL
GLUCOSE SERPL-MCNC: 85 MG/DL (ref 65–99)
HCT VFR BLD AUTO: 32.2 % (ref 34–46.6)
HGB BLD-MCNC: 10.6 G/DL (ref 12–15.9)
LDH SERPL-CCNC: 480 U/L (ref 135–214)
MCH RBC QN AUTO: 27.3 PG (ref 26.6–33)
MCHC RBC AUTO-ENTMCNC: 32.9 G/DL (ref 31.5–35.7)
MCV RBC AUTO: 83 FL (ref 79–97)
PLATELET # BLD AUTO: 180 10*3/MM3 (ref 140–450)
PMV BLD AUTO: 11.2 FL (ref 6–12)
POTASSIUM SERPL-SCNC: 3.6 MMOL/L (ref 3.5–5.2)
PROT ?TM UR-MCNC: 11.1 MG/DL
PROT SERPL-MCNC: 6.2 G/DL (ref 6–8.5)
PROT/CREAT UR: 84.3 MG/G CREA (ref 0–200)
RBC # BLD AUTO: 3.88 10*6/MM3 (ref 3.77–5.28)
RH BLD: NEGATIVE
SODIUM SERPL-SCNC: 139 MMOL/L (ref 136–145)
T&S EXPIRATION DATE: NORMAL
TREPONEMA PALLIDUM IGG+IGM AB [PRESENCE] IN SERUM OR PLASMA BY IMMUNOASSAY: NORMAL
URATE SERPL-MCNC: 3.8 MG/DL (ref 2.4–5.7)
WBC NRBC COR # BLD AUTO: 8.64 10*3/MM3 (ref 3.4–10.8)

## 2025-04-11 PROCEDURE — 25010000002 FENTANYL CITRATE (PF) 50 MCG/ML SOLUTION: Performed by: NURSE ANESTHETIST, CERTIFIED REGISTERED

## 2025-04-11 PROCEDURE — 3E033VJ INTRODUCTION OF OTHER HORMONE INTO PERIPHERAL VEIN, PERCUTANEOUS APPROACH: ICD-10-PCS | Performed by: STUDENT IN AN ORGANIZED HEALTH CARE EDUCATION/TRAINING PROGRAM

## 2025-04-11 PROCEDURE — 25010000002 LIDOCAINE-EPINEPHRINE (PF) 1.5 %-1:200000 SOLUTION: Performed by: NURSE ANESTHETIST, CERTIFIED REGISTERED

## 2025-04-11 PROCEDURE — 59410 OBSTETRICAL CARE: CPT | Performed by: STUDENT IN AN ORGANIZED HEALTH CARE EDUCATION/TRAINING PROGRAM

## 2025-04-11 PROCEDURE — 25810000003 LACTATED RINGERS SOLUTION: Performed by: STUDENT IN AN ORGANIZED HEALTH CARE EDUCATION/TRAINING PROGRAM

## 2025-04-11 PROCEDURE — 86901 BLOOD TYPING SEROLOGIC RH(D): CPT | Performed by: STUDENT IN AN ORGANIZED HEALTH CARE EDUCATION/TRAINING PROGRAM

## 2025-04-11 PROCEDURE — 25010000002 ROPIVACAINE PER 1 MG: Performed by: NURSE ANESTHETIST, CERTIFIED REGISTERED

## 2025-04-11 PROCEDURE — 59025 FETAL NON-STRESS TEST: CPT

## 2025-04-11 PROCEDURE — 80053 COMPREHEN METABOLIC PANEL: CPT | Performed by: STUDENT IN AN ORGANIZED HEALTH CARE EDUCATION/TRAINING PROGRAM

## 2025-04-11 PROCEDURE — 82570 ASSAY OF URINE CREATININE: CPT | Performed by: STUDENT IN AN ORGANIZED HEALTH CARE EDUCATION/TRAINING PROGRAM

## 2025-04-11 PROCEDURE — C1755 CATHETER, INTRASPINAL: HCPCS | Performed by: ANESTHESIOLOGY

## 2025-04-11 PROCEDURE — 86850 RBC ANTIBODY SCREEN: CPT | Performed by: STUDENT IN AN ORGANIZED HEALTH CARE EDUCATION/TRAINING PROGRAM

## 2025-04-11 PROCEDURE — 84550 ASSAY OF BLOOD/URIC ACID: CPT | Performed by: STUDENT IN AN ORGANIZED HEALTH CARE EDUCATION/TRAINING PROGRAM

## 2025-04-11 PROCEDURE — 86900 BLOOD TYPING SEROLOGIC ABO: CPT | Performed by: STUDENT IN AN ORGANIZED HEALTH CARE EDUCATION/TRAINING PROGRAM

## 2025-04-11 PROCEDURE — 83615 LACTATE (LD) (LDH) ENZYME: CPT | Performed by: STUDENT IN AN ORGANIZED HEALTH CARE EDUCATION/TRAINING PROGRAM

## 2025-04-11 PROCEDURE — 86780 TREPONEMA PALLIDUM: CPT | Performed by: STUDENT IN AN ORGANIZED HEALTH CARE EDUCATION/TRAINING PROGRAM

## 2025-04-11 PROCEDURE — C1755 CATHETER, INTRASPINAL: HCPCS

## 2025-04-11 PROCEDURE — 25010000002 LIDOCAINE-EPINEPHRINE (PF) 2 %-1:200000 SOLUTION: Performed by: NURSE ANESTHETIST, CERTIFIED REGISTERED

## 2025-04-11 PROCEDURE — 85027 COMPLETE CBC AUTOMATED: CPT | Performed by: STUDENT IN AN ORGANIZED HEALTH CARE EDUCATION/TRAINING PROGRAM

## 2025-04-11 PROCEDURE — 84156 ASSAY OF PROTEIN URINE: CPT | Performed by: STUDENT IN AN ORGANIZED HEALTH CARE EDUCATION/TRAINING PROGRAM

## 2025-04-11 PROCEDURE — 10907ZC DRAINAGE OF AMNIOTIC FLUID, THERAPEUTIC FROM PRODUCTS OF CONCEPTION, VIA NATURAL OR ARTIFICIAL OPENING: ICD-10-PCS | Performed by: STUDENT IN AN ORGANIZED HEALTH CARE EDUCATION/TRAINING PROGRAM

## 2025-04-11 PROCEDURE — 25810000003 LACTATED RINGERS PER 1000 ML: Performed by: STUDENT IN AN ORGANIZED HEALTH CARE EDUCATION/TRAINING PROGRAM

## 2025-04-11 RX ORDER — ONDANSETRON 2 MG/ML
4 INJECTION INTRAMUSCULAR; INTRAVENOUS ONCE AS NEEDED
Status: DISCONTINUED | OUTPATIENT
Start: 2025-04-11 | End: 2025-04-11 | Stop reason: HOSPADM

## 2025-04-11 RX ORDER — PROMETHAZINE HYDROCHLORIDE 12.5 MG/1
12.5 TABLET ORAL EVERY 6 HOURS PRN
Status: DISCONTINUED | OUTPATIENT
Start: 2025-04-11 | End: 2025-04-11 | Stop reason: HOSPADM

## 2025-04-11 RX ORDER — LIDOCAINE HYDROCHLORIDE 10 MG/ML
0.5 INJECTION, SOLUTION EPIDURAL; INFILTRATION; INTRACAUDAL; PERINEURAL ONCE AS NEEDED
Status: DISCONTINUED | OUTPATIENT
Start: 2025-04-11 | End: 2025-04-11 | Stop reason: HOSPADM

## 2025-04-11 RX ORDER — ONDANSETRON 4 MG/1
4 TABLET, ORALLY DISINTEGRATING ORAL EVERY 6 HOURS PRN
Status: DISCONTINUED | OUTPATIENT
Start: 2025-04-11 | End: 2025-04-11 | Stop reason: HOSPADM

## 2025-04-11 RX ORDER — DIPHENHYDRAMINE HYDROCHLORIDE 50 MG/ML
12.5 INJECTION, SOLUTION INTRAMUSCULAR; INTRAVENOUS EVERY 8 HOURS PRN
Status: DISCONTINUED | OUTPATIENT
Start: 2025-04-11 | End: 2025-04-11 | Stop reason: HOSPADM

## 2025-04-11 RX ORDER — EPHEDRINE SULFATE 5 MG/ML
INJECTION INTRAVENOUS
Status: COMPLETED
Start: 2025-04-11 | End: 2025-04-11

## 2025-04-11 RX ORDER — LIDOCAINE HYDROCHLORIDE AND EPINEPHRINE 15; 5 MG/ML; UG/ML
INJECTION, SOLUTION EPIDURAL AS NEEDED
Status: DISCONTINUED | OUTPATIENT
Start: 2025-04-11 | End: 2025-04-11 | Stop reason: SURG

## 2025-04-11 RX ORDER — CALCIUM CARBONATE 500 MG/1
1 TABLET, CHEWABLE ORAL 3 TIMES DAILY PRN
Status: DISCONTINUED | OUTPATIENT
Start: 2025-04-11 | End: 2025-04-11

## 2025-04-11 RX ORDER — ONDANSETRON 2 MG/ML
4 INJECTION INTRAMUSCULAR; INTRAVENOUS EVERY 6 HOURS PRN
Status: DISCONTINUED | OUTPATIENT
Start: 2025-04-11 | End: 2025-04-11 | Stop reason: HOSPADM

## 2025-04-11 RX ORDER — HYDROCODONE BITARTRATE AND ACETAMINOPHEN 10; 325 MG/1; MG/1
1 TABLET ORAL EVERY 4 HOURS PRN
Status: DISCONTINUED | OUTPATIENT
Start: 2025-04-11 | End: 2025-04-13 | Stop reason: HOSPADM

## 2025-04-11 RX ORDER — ACETAMINOPHEN 325 MG/1
650 TABLET ORAL EVERY 6 HOURS PRN
Status: DISCONTINUED | OUTPATIENT
Start: 2025-04-11 | End: 2025-04-13 | Stop reason: HOSPADM

## 2025-04-11 RX ORDER — SODIUM CHLORIDE 0.9 % (FLUSH) 0.9 %
1-10 SYRINGE (ML) INJECTION AS NEEDED
Status: DISCONTINUED | OUTPATIENT
Start: 2025-04-11 | End: 2025-04-13 | Stop reason: HOSPADM

## 2025-04-11 RX ORDER — OXYCODONE AND ACETAMINOPHEN 5; 325 MG/1; MG/1
1 TABLET ORAL EVERY 4 HOURS PRN
Refills: 0 | Status: DISCONTINUED | OUTPATIENT
Start: 2025-04-11 | End: 2025-04-11 | Stop reason: HOSPADM

## 2025-04-11 RX ORDER — MAGNESIUM CARB/ALUMINUM HYDROX 105-160MG
30 TABLET,CHEWABLE ORAL ONCE
Status: DISCONTINUED | OUTPATIENT
Start: 2025-04-11 | End: 2025-04-11 | Stop reason: HOSPADM

## 2025-04-11 RX ORDER — IBUPROFEN 600 MG/1
600 TABLET, FILM COATED ORAL EVERY 6 HOURS PRN
Status: DISCONTINUED | OUTPATIENT
Start: 2025-04-11 | End: 2025-04-11 | Stop reason: HOSPADM

## 2025-04-11 RX ORDER — OXYTOCIN/0.9 % SODIUM CHLORIDE 30/500 ML
999 PLASTIC BAG, INJECTION (ML) INTRAVENOUS ONCE
Status: COMPLETED | OUTPATIENT
Start: 2025-04-11 | End: 2025-04-11

## 2025-04-11 RX ORDER — ACETAMINOPHEN 325 MG/1
650 TABLET ORAL EVERY 4 HOURS PRN
Status: DISCONTINUED | OUTPATIENT
Start: 2025-04-11 | End: 2025-04-11 | Stop reason: HOSPADM

## 2025-04-11 RX ORDER — SODIUM CHLORIDE 0.9 % (FLUSH) 0.9 %
10 SYRINGE (ML) INJECTION AS NEEDED
Status: DISCONTINUED | OUTPATIENT
Start: 2025-04-11 | End: 2025-04-11 | Stop reason: HOSPADM

## 2025-04-11 RX ORDER — FENTANYL CITRATE 50 UG/ML
INJECTION, SOLUTION INTRAMUSCULAR; INTRAVENOUS AS NEEDED
Status: DISCONTINUED | OUTPATIENT
Start: 2025-04-11 | End: 2025-04-11 | Stop reason: SURG

## 2025-04-11 RX ORDER — MISOPROSTOL 200 UG/1
TABLET ORAL
Status: COMPLETED
Start: 2025-04-11 | End: 2025-04-11

## 2025-04-11 RX ORDER — SODIUM CHLORIDE 0.9 % (FLUSH) 0.9 %
10 SYRINGE (ML) INJECTION EVERY 12 HOURS SCHEDULED
Status: DISCONTINUED | OUTPATIENT
Start: 2025-04-11 | End: 2025-04-11 | Stop reason: HOSPADM

## 2025-04-11 RX ORDER — CITRIC ACID/SODIUM CITRATE 334-500MG
30 SOLUTION, ORAL ORAL ONCE
Status: DISCONTINUED | OUTPATIENT
Start: 2025-04-11 | End: 2025-04-11 | Stop reason: HOSPADM

## 2025-04-11 RX ORDER — DIPHENHYDRAMINE HCL 25 MG
25 CAPSULE ORAL NIGHTLY PRN
Status: DISCONTINUED | OUTPATIENT
Start: 2025-04-11 | End: 2025-04-13 | Stop reason: HOSPADM

## 2025-04-11 RX ORDER — METHYLERGONOVINE MALEATE 0.2 MG/ML
200 INJECTION INTRAVENOUS ONCE AS NEEDED
Status: DISCONTINUED | OUTPATIENT
Start: 2025-04-11 | End: 2025-04-11 | Stop reason: HOSPADM

## 2025-04-11 RX ORDER — MISOPROSTOL 200 UG/1
800 TABLET ORAL AS NEEDED
Status: DISCONTINUED | OUTPATIENT
Start: 2025-04-11 | End: 2025-04-11 | Stop reason: HOSPADM

## 2025-04-11 RX ORDER — HYDROCORTISONE 25 MG/G
1 CREAM TOPICAL AS NEEDED
Status: DISCONTINUED | OUTPATIENT
Start: 2025-04-11 | End: 2025-04-13 | Stop reason: HOSPADM

## 2025-04-11 RX ORDER — BUTORPHANOL TARTRATE 1 MG/ML
1 INJECTION, SOLUTION INTRAMUSCULAR; INTRAVENOUS
Status: DISCONTINUED | OUTPATIENT
Start: 2025-04-11 | End: 2025-04-11 | Stop reason: HOSPADM

## 2025-04-11 RX ORDER — ACETAMINOPHEN AND CODEINE PHOSPHATE 120; 12 MG/5ML; MG/5ML
1 SOLUTION ORAL DAILY
Qty: 28 TABLET | Refills: 12 | Status: SHIPPED | OUTPATIENT
Start: 2025-04-11 | End: 2026-04-11

## 2025-04-11 RX ORDER — BISACODYL 10 MG
10 SUPPOSITORY, RECTAL RECTAL DAILY PRN
Status: DISCONTINUED | OUTPATIENT
Start: 2025-04-12 | End: 2025-04-13 | Stop reason: HOSPADM

## 2025-04-11 RX ORDER — EPHEDRINE SULFATE 5 MG/ML
10 INJECTION INTRAVENOUS
Status: DISCONTINUED | OUTPATIENT
Start: 2025-04-11 | End: 2025-04-11 | Stop reason: HOSPADM

## 2025-04-11 RX ORDER — ROPIVACAINE HYDROCHLORIDE 2 MG/ML
14 INJECTION, SOLUTION EPIDURAL; INFILTRATION; PERINEURAL CONTINUOUS
Status: DISCONTINUED | OUTPATIENT
Start: 2025-04-11 | End: 2025-04-11

## 2025-04-11 RX ORDER — LIDOCAINE HCL/EPINEPHRINE/PF 2%-1:200K
VIAL (ML) INJECTION AS NEEDED
Status: DISCONTINUED | OUTPATIENT
Start: 2025-04-11 | End: 2025-04-11 | Stop reason: SURG

## 2025-04-11 RX ORDER — SODIUM CHLORIDE, SODIUM LACTATE, POTASSIUM CHLORIDE, CALCIUM CHLORIDE 600; 310; 30; 20 MG/100ML; MG/100ML; MG/100ML; MG/100ML
125 INJECTION, SOLUTION INTRAVENOUS CONTINUOUS
Status: DISCONTINUED | OUTPATIENT
Start: 2025-04-11 | End: 2025-04-11

## 2025-04-11 RX ORDER — LABETALOL 200 MG/1
100 TABLET, FILM COATED ORAL EVERY 8 HOURS SCHEDULED
Status: DISCONTINUED | OUTPATIENT
Start: 2025-04-11 | End: 2025-04-13 | Stop reason: HOSPADM

## 2025-04-11 RX ORDER — HYDROCODONE BITARTRATE AND ACETAMINOPHEN 5; 325 MG/1; MG/1
1 TABLET ORAL EVERY 4 HOURS PRN
Status: DISCONTINUED | OUTPATIENT
Start: 2025-04-11 | End: 2025-04-13 | Stop reason: HOSPADM

## 2025-04-11 RX ORDER — OXYTOCIN/0.9 % SODIUM CHLORIDE 30/500 ML
2-20 PLASTIC BAG, INJECTION (ML) INTRAVENOUS
Status: DISCONTINUED | OUTPATIENT
Start: 2025-04-11 | End: 2025-04-11 | Stop reason: HOSPADM

## 2025-04-11 RX ORDER — IBUPROFEN 600 MG/1
600 TABLET, FILM COATED ORAL EVERY 6 HOURS PRN
Status: DISCONTINUED | OUTPATIENT
Start: 2025-04-11 | End: 2025-04-13 | Stop reason: HOSPADM

## 2025-04-11 RX ORDER — OXYTOCIN/0.9 % SODIUM CHLORIDE 30/500 ML
125 PLASTIC BAG, INJECTION (ML) INTRAVENOUS ONCE AS NEEDED
Status: DISCONTINUED | OUTPATIENT
Start: 2025-04-11 | End: 2025-04-13 | Stop reason: HOSPADM

## 2025-04-11 RX ORDER — OXYTOCIN/0.9 % SODIUM CHLORIDE 30/500 ML
250 PLASTIC BAG, INJECTION (ML) INTRAVENOUS CONTINUOUS
Status: DISPENSED | OUTPATIENT
Start: 2025-04-11 | End: 2025-04-11

## 2025-04-11 RX ORDER — DOCUSATE SODIUM 100 MG/1
100 CAPSULE, LIQUID FILLED ORAL 2 TIMES DAILY
Status: DISCONTINUED | OUTPATIENT
Start: 2025-04-11 | End: 2025-04-13 | Stop reason: HOSPADM

## 2025-04-11 RX ORDER — BUTORPHANOL TARTRATE 2 MG/ML
2 INJECTION, SOLUTION INTRAMUSCULAR; INTRAVENOUS
Status: DISCONTINUED | OUTPATIENT
Start: 2025-04-11 | End: 2025-04-11 | Stop reason: HOSPADM

## 2025-04-11 RX ORDER — SODIUM CHLORIDE 9 MG/ML
40 INJECTION, SOLUTION INTRAVENOUS AS NEEDED
Status: DISCONTINUED | OUTPATIENT
Start: 2025-04-11 | End: 2025-04-11 | Stop reason: HOSPADM

## 2025-04-11 RX ORDER — CARBOPROST TROMETHAMINE 250 UG/ML
250 INJECTION, SOLUTION INTRAMUSCULAR AS NEEDED
Status: DISCONTINUED | OUTPATIENT
Start: 2025-04-11 | End: 2025-04-11 | Stop reason: HOSPADM

## 2025-04-11 RX ORDER — PROMETHAZINE HYDROCHLORIDE 12.5 MG/1
12.5 SUPPOSITORY RECTAL EVERY 6 HOURS PRN
Status: DISCONTINUED | OUTPATIENT
Start: 2025-04-11 | End: 2025-04-11 | Stop reason: HOSPADM

## 2025-04-11 RX ADMIN — Medication 1 APPLICATION: at 22:44

## 2025-04-11 RX ADMIN — SODIUM CHLORIDE, SODIUM LACTATE, POTASSIUM CHLORIDE, CALCIUM CHLORIDE 125 ML/HR: 20; 30; 600; 310 INJECTION, SOLUTION INTRAVENOUS at 01:23

## 2025-04-11 RX ADMIN — IBUPROFEN 600 MG: 600 TABLET, FILM COATED ORAL at 17:35

## 2025-04-11 RX ADMIN — MISOPROSTOL 800 MCG: 200 TABLET ORAL at 13:51

## 2025-04-11 RX ADMIN — EPHEDRINE SULFATE 10 MG: 5 INJECTION INTRAVENOUS at 12:19

## 2025-04-11 RX ADMIN — LABETALOL HYDROCHLORIDE 100 MG: 200 TABLET, FILM COATED ORAL at 08:57

## 2025-04-11 RX ADMIN — Medication 250 ML/HR: at 14:18

## 2025-04-11 RX ADMIN — WITCH HAZEL 1 PAD: 500 SOLUTION RECTAL; TOPICAL at 17:35

## 2025-04-11 RX ADMIN — EPHEDRINE SULFATE 10 MG: 5 INJECTION INTRAVENOUS at 11:54

## 2025-04-11 RX ADMIN — LIDOCAINE HYDROCHLORIDE,EPINEPHRINE BITARTRATE 6 ML: 20; .005 INJECTION, SOLUTION EPIDURAL; INFILTRATION; INTRACAUDAL; PERINEURAL at 11:24

## 2025-04-11 RX ADMIN — ROPIVACAINE HYDROCHLORIDE 14 ML/HR: 2 INJECTION, SOLUTION EPIDURAL; INFILTRATION at 10:47

## 2025-04-11 RX ADMIN — Medication 2 MILLI-UNITS/MIN: at 01:57

## 2025-04-11 RX ADMIN — LABETALOL HYDROCHLORIDE 100 MG: 200 TABLET, FILM COATED ORAL at 17:35

## 2025-04-11 RX ADMIN — Medication: at 17:35

## 2025-04-11 RX ADMIN — LIDOCAINE HYDROCHLORIDE AND EPINEPHRINE 3 ML: 15; 5 INJECTION, SOLUTION EPIDURAL at 09:50

## 2025-04-11 RX ADMIN — Medication 999 ML/HR: at 13:48

## 2025-04-11 RX ADMIN — SODIUM CHLORIDE, SODIUM LACTATE, POTASSIUM CHLORIDE, CALCIUM CHLORIDE 1000 ML: 20; 30; 600; 310 INJECTION, SOLUTION INTRAVENOUS at 08:49

## 2025-04-11 RX ADMIN — LABETALOL HYDROCHLORIDE 100 MG: 200 TABLET, FILM COATED ORAL at 01:55

## 2025-04-11 RX ADMIN — ROPIVACAINE HYDROCHLORIDE 8 ML: 5 INJECTION, SOLUTION EPIDURAL; INFILTRATION; PERINEURAL at 09:54

## 2025-04-11 RX ADMIN — FENTANYL CITRATE 100 MCG: 50 INJECTION, SOLUTION INTRAMUSCULAR; INTRAVENOUS at 09:52

## 2025-04-11 RX ADMIN — CALCIUM CARBONATE (ANTACID) CHEW TAB 500 MG 1 TABLET: 500 CHEW TAB at 06:42

## 2025-04-11 NOTE — INTERVAL H&P NOTE
H&P reviewed. The patient was examined and there are no changes to the H&P.    Valerie Martins MD  Obstetrics and Gynecology  [facility]

## 2025-04-11 NOTE — PROGRESS NOTES
SARA Bajwa  Huyen Interiano  : 2000  MRN: [id number]  CSN: [id number]    Labor progress note    Subjective     CC: hospital follow-up    She is having no problems.     Objective   Min/max vitals past 24 hours:  Temp  Min: 98.4 °F (36.9 °C)  Max: 98.8 °F (37.1 °C)   BP  Min: 116/80  Max: 132/79   Pulse  Min: 89  Max: 99   Resp  Min: 16  Max: 16        FHT's: reassuring, baseline 130 bpm, moderate variability, + accelerations, no decelerations, and category 1   Cervix: was checked (by me): 4 cm / 70 % / -2   Contractions: regular every 4 minutes        Assessment   IUP at 37w0d  Fetal status reassuring     Plan   Continue with induction  AROM - clear fluid seen     Valerie Martins MD  2025  07:56 EDT

## 2025-04-11 NOTE — L&D DELIVERY NOTE
" [facility]   Vaginal Delivery Note    Patient Name: Huyen Interiano  : 2000  MRN: [id number]    Date of Delivery: 2025    Diagnosis     Pre & Post-Delivery:  Intrauterine pregnancy at 37w0d  Labor status: Induced Onset of Labor    Gestational hypertension    Postpartum care following vaginal delivery             Problem List    Transfer to Postpartum     Review the Delivery Report for details.     Delivery     Delivery: Vaginal, Spontaneous    YOB: 2023   Time of Birth:  Gestational Age 1:44 PM  37w0d     Anesthesia: Epidural    Delivering clinician: Valerie Martins   Forceps?   No   Vacuum? No    Shoulder dystocia present: No        Delivery narrative:  The patient was checked and found to be 10/100/+2. She pushed over an intact perineum x 3 contractions for a spontaneous vaginal delivery. There was no nuchal cord. Delayed cord clamping was performed and infant handed off to the waiting staff. The placenta followed spontaneously. There were no perineal lacerations. Bleeding was stable, but given short interval pregnancy, cytotec 800 mcg was placed rectally for bleeding prophylaxis.       Infant     Findings: male infant     Infant observations: Weight: No birth weight on file.  Length:   in  Observations/Comments:        Apgars: 7  @ 1 minute /    9  @ 5 minutes   Infant Name: Micah Guajardo      Placenta & Cord         Placenta delivered  Spontaneous at   2025  1:48 PM    Cord:   present.   Nuchal Cord?  no   Cord blood obtained: Yes   Cord gases obtained:      Cord gas results: Venous:  No results found for: \"PHCVEN\", \"BECVEN\"    Arterial:  No results found for: \"PHCART\", \"BECART\"     Repair     Episiotomy: Not recorded    No    Lacerations: No   Estimated Blood Loss:   150 mL      Quantitative Blood Loss:          Complications     Gestational hypertension -- continue home labetalol 100 mg TID     Disposition     Mother to Mother Baby/Postpartum  in stable condition " currently.  Baby to remains with mom  in stable condition currently.    Valerie Martins MD  04/11/25  14:42 EDT

## 2025-04-11 NOTE — PROGRESS NOTES
SARA Bajwa  Huyen Interiano  : 2000  MRN: [id number]  CSN: [id number]    Labor progress note    Subjective     CC: hospital follow-up    She reports her pain is well contolled.     Objective   Min/max vitals past 24 hours:  Temp  Min: 98.2 °F (36.8 °C)  Max: 98.8 °F (37.1 °C)   BP  Min: 0/0  Max: 169/86   Pulse  Min: 81  Max: 114   Resp  Min: 16  Max: 18        FHT's: reassuring and category 1   Cervix: was checked (by me): 8 cm / 90 % / -1   Contractions: regular        Assessment   IUP at 37w0d  Progressing in labor  Fetal status reassuring     Plan   Expectant management. Marlin on her own without pitocin     Valerie Martins MD  2025  13:21 EDT

## 2025-04-11 NOTE — ANESTHESIA PROCEDURE NOTES
Labor Epidural      Patient reassessed immediately prior to procedure    Patient location during procedure: OB  Performed By  CRNA/CAA: Laverne Claire CRNA  Preanesthetic Checklist  Completed: patient identified, IV checked, risks and benefits discussed, surgical consent, monitors and equipment checked, pre-op evaluation and timeout performed  Prep:  Pt Position:sitting  Sterile Tech:cap, gloves, mask and sterile barrier  Prep:DuraPrep  Monitoring:blood pressure monitoring  Epidural Block Procedure:  Approach:midline  Guidance:palpation technique  Location:L3-L4  Needle Type:Tuohy  Needle Gauge:17 G  Loss of Resistance Medium: saline  Loss of Resistance: 6cm  Cath Depth at skin:13 cm  Paresthesia: none  Aspiration:negative  Test Dose:negative  Number of Attempts: 1  Post Assessment:  Dressing:occlusive dressing applied and secured with tape  Pt Tolerance:patient tolerated the procedure well with no apparent complications  Complications:no

## 2025-04-11 NOTE — ANESTHESIA PREPROCEDURE EVALUATION
Anesthesia Evaluation     NPO Solid Status: > 2 hours  NPO Liquid Status: > 2 hours           Airway   Dental      Pulmonary    Cardiovascular     (+) hypertension      Neuro/Psych  (+) psychiatric history Anxiety and Depression  GI/Hepatic/Renal/Endo    (+) GERD    Musculoskeletal     Abdominal    Substance History      OB/GYN    (+) Pregnant, pregnancy induced hypertension        Other          Other Comment: Factor V Leiden              Anesthesia Plan    ASA 3     epidural       Anesthetic plan, risks, benefits, and alternatives have been provided, discussed and informed consent has been obtained with: patient.    CODE STATUS:    Code Status (Patient has no pulse and is not breathing): CPR (Attempt to Resuscitate)  Medical Interventions (Patient has pulse or is breathing): Full Support  Level Of Support Discussed With: Patient

## 2025-04-12 LAB
ABO GROUP BLD: NORMAL
BASOPHILS # BLD AUTO: 0.03 10*3/MM3 (ref 0–0.2)
BASOPHILS NFR BLD AUTO: 0.3 % (ref 0–1.5)
DEPRECATED RDW RBC AUTO: 43 FL (ref 37–54)
EOSINOPHIL # BLD AUTO: 0.06 10*3/MM3 (ref 0–0.4)
EOSINOPHIL NFR BLD AUTO: 0.6 % (ref 0.3–6.2)
ERYTHROCYTE [DISTWIDTH] IN BLOOD BY AUTOMATED COUNT: 13.9 % (ref 12.3–15.4)
FETAL BLEED: NEGATIVE
HCT VFR BLD AUTO: 31 % (ref 34–46.6)
HGB BLD-MCNC: 9.8 G/DL (ref 12–15.9)
IMM GRANULOCYTES # BLD AUTO: 0.05 10*3/MM3 (ref 0–0.05)
IMM GRANULOCYTES NFR BLD AUTO: 0.5 % (ref 0–0.5)
LYMPHOCYTES # BLD AUTO: 2.4 10*3/MM3 (ref 0.7–3.1)
LYMPHOCYTES NFR BLD AUTO: 22.6 % (ref 19.6–45.3)
MCH RBC QN AUTO: 26.9 PG (ref 26.6–33)
MCHC RBC AUTO-ENTMCNC: 31.6 G/DL (ref 31.5–35.7)
MCV RBC AUTO: 85.2 FL (ref 79–97)
MONOCYTES # BLD AUTO: 1.02 10*3/MM3 (ref 0.1–0.9)
MONOCYTES NFR BLD AUTO: 9.6 % (ref 5–12)
NEUTROPHILS NFR BLD AUTO: 66.4 % (ref 42.7–76)
NEUTROPHILS NFR BLD AUTO: 7.04 10*3/MM3 (ref 1.7–7)
NRBC BLD AUTO-RTO: 0 /100 WBC (ref 0–0.2)
NUMBER OF DOSES: NORMAL
PLATELET # BLD AUTO: 145 10*3/MM3 (ref 140–450)
PMV BLD AUTO: 11.4 FL (ref 6–12)
RBC # BLD AUTO: 3.64 10*6/MM3 (ref 3.77–5.28)
RH BLD: NEGATIVE
WBC NRBC COR # BLD AUTO: 10.6 10*3/MM3 (ref 3.4–10.8)

## 2025-04-12 PROCEDURE — 85025 COMPLETE CBC W/AUTO DIFF WBC: CPT | Performed by: STUDENT IN AN ORGANIZED HEALTH CARE EDUCATION/TRAINING PROGRAM

## 2025-04-12 PROCEDURE — 86900 BLOOD TYPING SEROLOGIC ABO: CPT | Performed by: STUDENT IN AN ORGANIZED HEALTH CARE EDUCATION/TRAINING PROGRAM

## 2025-04-12 PROCEDURE — 85461 HEMOGLOBIN FETAL: CPT | Performed by: STUDENT IN AN ORGANIZED HEALTH CARE EDUCATION/TRAINING PROGRAM

## 2025-04-12 PROCEDURE — 25010000002 RHO D IMMUNE GLOBULIN 1500 UNIT/2ML SOLUTION PREFILLED SYRINGE: Performed by: STUDENT IN AN ORGANIZED HEALTH CARE EDUCATION/TRAINING PROGRAM

## 2025-04-12 PROCEDURE — 86901 BLOOD TYPING SEROLOGIC RH(D): CPT | Performed by: STUDENT IN AN ORGANIZED HEALTH CARE EDUCATION/TRAINING PROGRAM

## 2025-04-12 RX ADMIN — DOCUSATE SODIUM 100 MG: 100 CAPSULE, LIQUID FILLED ORAL at 23:23

## 2025-04-12 RX ADMIN — ACETAMINOPHEN 650 MG: 325 TABLET, FILM COATED ORAL at 23:21

## 2025-04-12 RX ADMIN — LABETALOL HYDROCHLORIDE 100 MG: 200 TABLET, FILM COATED ORAL at 01:40

## 2025-04-12 RX ADMIN — HUMAN RHO(D) IMMUNE GLOBULIN 1500 UNITS: 1500 SOLUTION INTRAMUSCULAR; INTRAVENOUS at 10:49

## 2025-04-12 RX ADMIN — LABETALOL HYDROCHLORIDE 100 MG: 200 TABLET, FILM COATED ORAL at 09:51

## 2025-04-12 RX ADMIN — LABETALOL HYDROCHLORIDE 100 MG: 200 TABLET, FILM COATED ORAL at 16:50

## 2025-04-12 RX ADMIN — DOCUSATE SODIUM 100 MG: 100 CAPSULE, LIQUID FILLED ORAL at 09:51

## 2025-04-12 NOTE — ANESTHESIA POSTPROCEDURE EVALUATION
Patient: Huyen Interiano    Procedure Summary       Date: 04/11/25 Room / Location:     Anesthesia Start: 0935 Anesthesia Stop: 1348    Procedure: LABOR ANALGESIA Diagnosis:     Scheduled Providers:  Provider: Cha Sheehan DO    Anesthesia Type: epidural ASA Status: 3            Anesthesia Type: No value filed.    Vitals  Vitals Value Taken Time   /81 04/12/25 15:37   Temp 98.7 °F (37.1 °C) 04/12/25 15:37   Pulse 100 04/12/25 15:37   Resp 18 04/12/25 15:37   SpO2             Post Anesthesia Care and Evaluation    Patient location during evaluation: bedside  Patient participation: complete - patient participated  Level of consciousness: awake  Pain score: 0  Pain management: satisfactory to patient    Airway patency: patent  Anesthetic complications: No anesthetic complications  PONV Status: none  Cardiovascular status: acceptable and hemodynamically stable  Respiratory status: acceptable  Hydration status: acceptable  Post Neuraxial Block status: Motor and sensory function returned to baseline and No signs or symptoms of PDPH

## 2025-04-12 NOTE — LACTATION NOTE
04/11/25 2130   Maternal Information   Date of Referral 04/11/25   Person Making Referral physician  (NICU consult)   Maternal Reason for Referral separation from infant   Infant Reason for Referral separation from mother   Maternal Assessment   Breast Size Issue none   Breast Shape Bilateral:;round   Breast Density Bilateral:;soft   Nipples Bilateral:;everted   Left Nipple Symptoms intact;nontender   Right Nipple Symptoms intact;nontender   Maternal Infant Feeding   Maternal Emotional State relaxed;receptive   Milk Expression/Equipment   Breast Pump Type double electric, hospital grade;double electric, personal  (she has a wearable pump. Will check with insurance about coverage for S2 as she has gotten a pump that is now broken within the last year.)   Breast Pump Flange Type hard   Breast Pumping   Breast Pumping Interventions early pumping promoted;frequent pumping encouraged;other (see comments)  (encouraged to pump q 3 hours around the clock to build optimal milk supply)   Breast Pumping double electric breast pump utilized   Lactation Referrals   Lactation Referrals outpatient lactation program   Outpatient Lactation Program Lactation Follow-up Date/Time prn after discharge     Consults visit to newly postpartum Mom of infant admitted to NICU. I setup hospital pump for pt. And demonstrated use. She v/u. She was able to get some drops out. Reviewed NICU teaching packet with parents. They verbalized understanding. Wash basin, soap, sterile oral syringes, and sterilization bag all provided with instructions on use. Parents v/u. Encouraged to pump q 3 hours around the clock to build optimal milk supply if that is what she would like to do. Pumping log provided with review of what is normal to expect for output in the first few days. Parents to ask for breast milk labels on next visit to NICU. Mom v/u. Encouraged to call lactation with any questions/concerns. Encouraged to f/u with outpatient clinic prn after  discharge.

## 2025-04-12 NOTE — PLAN OF CARE
Goal Outcome Evaluation:  Plan of Care Reviewed With: patient           Outcome Evaluation: pt doing well this shift. Vitals wnl. Bleeding wnl. Voiding adequately. Pain well controlled. No concerns noted at this time.

## 2025-04-12 NOTE — PROGRESS NOTES
Postpartum Progress Note    Patient name: Huyen Interiano  YOB: 2000   MRN: [id number]  Referring Provider: Valerie Martins MD  Admission Date: 2025  Date of Service: 2025    ID: 24 y.o.     Diagnosis:   S/p vaginal delivery     Gestational hypertension    Postpartum care following vaginal delivery       Subjective:      No complaints.  Moderate lochia.  Ambulating, voiding, tolerating diet.  Pain well controlled.  The patient is currently breastfeeding.   This baby is a male, desires circ    Objective:      Vital signs:  Vital Signs Range for the last 24 hours  Temperature: Temp:  [97.7 °F (36.5 °C)-98.9 °F (37.2 °C)] 98.2 °F (36.8 °C)   Temp Source: Temp src: Oral   BP: BP: (0-146)/(0-84) 125/80   Pulse: Heart Rate:  [] 83   Respirations: Resp:  [16-18] 16   Weight: 79.4 kg (175 lb)     General: Alert & oriented x4, in no apparent distress  Abdomen: soft, nontender  Uterus: firm, nontender  Extremities: nontender; no edema      Labs:  Lab Results   Component Value Date    WBC 10.60 2025    HGB 9.8 (L) 2025    HCT 31.0 (L) 2025    MCV 85.2 2025     2025     Results from last 7 days   Lab Units 25  0814   ABO TYPING  O   RH TYPING  Negative     External Prenatal Results       Pregnancy Outside Results - Transcribed From Office Records - See Scanned Records For Details       Test Value Date Time    ABO  O  25 0814    Rh  Negative  25 0814    Antibody Screen  Negative  25 0121       Positive  25 1526       Negative  25 0947       Positive  24 1615    Varicella IgG       Rubella  8.41 index 24 1615    Hgb  9.8 g/dL 25 0502       10.6 g/dL 25 0121       12.2 g/dL 25 1432       11.8 g/dL 25 1841       12.3 g/dL 01/15/25 1001       13.4 g/dL 24 1615       13.7 g/dL 10/02/24 0306       13.6 g/dL 24 2053    Hct  31.0 % 25 0502       32.2 % 25 0121        36.5 % 03/06/25 1432       33.6 % 02/02/25 1841       35.6 % 01/15/25 1001       39.2 % 11/05/24 1615       39.9 % 10/02/24 0306       38.7 % 09/30/24 2053    HgB A1c        1h GTT  104 mg/dL 01/15/25 1001    3h GTT Fasting       3h GTT 1 hour       3h GTT 2 hour       3h GTT 3 hour        Gonorrhea (discrete)       Chlamydia (discrete)       RPR  Non-Reactive  01/15/25 1001       Non-Reactive  11/05/24 1615    Syphils cascade: TP-Ab (FTA)  Non-Reactive  04/11/25 0121       Non-Reactive  03/06/25 1432    TP-Ab  Non-Reactive  04/11/25 0121       Non-Reactive  03/06/25 1432    TP-Ab (EIA)       TPPA       HBsAg  Non-Reactive  11/05/24 1615    Herpes Simplex Virus PCR       Herpes Simplex VIrus Culture       HIV  Non-Reactive  11/05/24 1615    Hep C RNA Quant PCR       Hep C Antibody  Non-Reactive  11/05/24 1615    AFP       NIPT       Cystic Fibrosis (Mikael)       Cystic Fibroisis        Spinal Muscular atrophy       Fragile X       Group B Strep ^ Not Detected  04/01/25     GBS Susceptibility to Clindamycin       GBS Susceptibility to Erythromycin       Fetal Fibronectin       Genetic Testing, Maternal Blood                 Drug Screening       Test Value Date Time    Urine Drug Screen       Amphetamine Screen  Negative  09/10/24 1351    Barbiturate Screen  Negative  09/10/24 1351    Benzodiazepine Screen  Negative  09/10/24 1351    Methadone Screen  Negative  09/10/24 1351    Phencyclidine Screen  Negative  09/10/24 1351    Opiates Screen  Negative  09/10/24 1351    THC Screen  Negative  09/10/24 1351    Cocaine Screen       Propoxyphene Screen       Buprenorphine Screen  Negative  09/10/24 1351    Methamphetamine Screen       Oxycodone Screen  Negative  09/10/24 1351    Tricyclic Antidepressants Screen  Negative  09/10/24 1351              Legend    ^: Historical                            Assessment/Plan:      PPD#1 s/p vaginal delivery.  1. S/p vaginal delivery: Doing well.Continue routine postpartum care.    2.  Infant feeding: Supportive care.  The patient is currently breastfeeding.  3. GHTN: VSS on labetalol. Denies preeclampsia symptoms.  4. RH negative: rhogam given

## 2025-04-13 VITALS
TEMPERATURE: 98.4 F | HEIGHT: 66 IN | HEART RATE: 91 BPM | BODY MASS INDEX: 28.12 KG/M2 | RESPIRATION RATE: 18 BRPM | DIASTOLIC BLOOD PRESSURE: 68 MMHG | SYSTOLIC BLOOD PRESSURE: 108 MMHG | WEIGHT: 175 LBS

## 2025-04-13 RX ADMIN — LABETALOL HYDROCHLORIDE 100 MG: 200 TABLET, FILM COATED ORAL at 09:36

## 2025-04-13 RX ADMIN — DOCUSATE SODIUM 100 MG: 100 CAPSULE, LIQUID FILLED ORAL at 09:36

## 2025-04-13 RX ADMIN — LABETALOL HYDROCHLORIDE 100 MG: 200 TABLET, FILM COATED ORAL at 01:50

## 2025-04-13 NOTE — DISCHARGE SUMMARY
Discharge Summary    Date of Admission: 2025  Date of Discharge:  2025      Patient: Huyen Interiano      MR#:[id number]    Delivery Provider: Valerie Martins    Attending Provider: Valerie Martins MD    Presenting Problem/History of Present Illness  Gestational hypertension [O13.9]       Gestational hypertension    Postpartum care following vaginal delivery         Discharge Diagnosis: Vaginal delivery at 37w0d    Procedures:  Vaginal, Spontaneous    2025   1:44 PM       Discharge Date: 2025;     Hospital Course  Patient is a 24 y.o. female  at 37w0d status post vaginal delivery without complication. Postpartum the patient did well. She remained afebrile, with vital signs stable. She was ready for discharge on postpartum day 2.     Infant:   male fetus 2980 g (6 lb 9.1 oz) with Apgar scores of 7 , 9  at five minutes.    Condition on Discharge:  Stable    Vital Signs  Temp:  [98.2 °F (36.8 °C)-98.7 °F (37.1 °C)] 98.4 °F (36.9 °C)  Heart Rate:  [] 91  Resp:  [18] 18  BP: (108-135)/(65-81) 108/68    Lab Results   Component Value Date    WBC 10.60 2025    HGB 9.8 (L) 2025    HCT 31.0 (L) 2025    MCV 85.2 2025     2025       Discharge Disposition  Home or Self Care    Discharge Medications     Discharge Medications        New Medications        Instructions Start Date   norethindrone 0.35 MG tablet  Commonly known as: MICRONOR   0.35 mg, Oral, Daily             Continue These Medications        Instructions Start Date   acetaminophen 325 MG tablet  Commonly known as: TYLENOL   650 mg, Oral, Every 4 Hours PRN      labetalol 100 MG tablet  Commonly known as: NORMODYNE   100 mg, Oral, Every 8 Hours Scheduled      PRENATAL VITAMINS PO   1 tablet, Daily               Discharge Diet:   Diet Instructions       Diet: Regular/House Diet; Thin (IDDSI 0)      Discharge Diet: Regular/House Diet    Fluid Consistency: Thin (IDDSI 0)            Activity at Discharge:    Activity Instructions       Bathing Restrictions      Type of Restriction:  Bathing  Sex       Explain Sexual Activity Restrictions: 6 weeks    Bathing Restrictions: No Tub Bath    Pelvic Rest              Follow-up Appointments  Future Appointments   Date Time Provider Department Center   5/23/2025  1:50 PM Valerie Martins MD [facility]     Additional Instructions for the Follow-ups that You Need to Schedule       Call MD With Problems / Concerns   As directed      Instructions: Monitor for excessive bleeding >1 pad/hr for several hours, dizziness, syncope, shortness of breath, chest pain, fever or changes in blood pressure. Call or go to ED with foul smelling discharge, fever of >100.4, signs of postpartum depression, saturating a pad in <1 hour, blood clots larger than a golf ball. Also, call with headache that does not resolve with medication or rest, vision changes, pain in right upper quadrant, worsening swelling, or BP >140/90 if able to monitor at home.    Order Comments: Instructions: Monitor for excessive bleeding >1 pad/hr for several hours, dizziness, syncope, shortness of breath, chest pain, fever or changes in blood pressure. Call or go to ED with foul smelling discharge, fever of >100.4, signs of postpartum depression, saturating a pad in <1 hour, blood clots larger than a golf ball. Also, call with headache that does not resolve with medication or rest, vision changes, pain in right upper quadrant, worsening swelling, or BP >140/90 if able to monitor at home.         Discharge Follow-up with Specified Provider: ; 3 Days   As directed      To:    Follow Up: 3 Days   Follow Up Details: bp check                CHAPIN Medrano  04/13/25  12:35 EDT  Csd

## 2025-04-21 ENCOUNTER — PATIENT OUTREACH (OUTPATIENT)
Dept: LABOR AND DELIVERY | Facility: HOSPITAL | Age: 25
End: 2025-04-21
Payer: MEDICAID

## 2025-04-21 NOTE — OUTREACH NOTE
Motherhood Connection  Postpartum Check-In    Questions/Answers      Flowsheet Row Responses   Visit Setting Telephone   Best Method for Contacting Cell   OB Discharge Note Reviewed  Reviewed   OB Discharge Medications Reviewed  Reviewed    discharged home with mother? Infant in ICU   Current Pain Levels 0-10 1   At Rest Pain Levels 0-10 1   Pain level with activity 0-10 1   Acceptable Pain Level 0-10 3   Pain Location Head   How do you treat your pain? Medications   Verbalized Emotional State Acceptance   Family/Support Network Significant Other   Level of Involvement in Care Attentive, Supportive   Do you feel comfortable in your relationship with your baby? Other   Other Comment NB is NICU.   Have members of your household adjusted to your baby? Other   Comment NB is NICU.   Is the baby's father supportive and/or involved with the baby? Yes   How does your partner feel about the baby? Involved   Do you feel safe at home, school and work? Yes   Are you in a relationship with someone who threatens you or hurts you? No   Do you have the resources to keep yourself and your baby healthy and safe? Yes   Lochia (per patient report) Brown-milagros Red   Amount Scant   Number of pads per day 2   Lochia Odor None   Is patient breastfeeding? Yes, pumping   pumping - Left Breast Nontender, Soft   Pumping - Right Breast Nontender, Soft   Pumping - Left Nipple Intact   Pumping - Right Nipple Intact   Frequency q3 hr   Pumping Quantity 180-240 ml   Storage of Milk freezing   Postpartum Depression Screening Education Education Provided   Doctor Appointments: Education Provided   Breastfeeding Education Education Provided   Postpartum Care Education Education Provided   S & S to report Education Provided   Followup Appointments Made Yes   Well Child Visit Appointments Made No  [NB is NICU.]   Appointment Date 25   Provider/Agency Dr Martins            Review of Systems   Genitourinary:  Positive for vaginal bleeding.         Small rubra   All other systems reviewed and are negative.    Most Recent Philip  Depression Scale Score (EPDS)    Performed by a clinician: 2 (2025  1:28 PM)    5 Ps Screen  Completed    Feeling well since going home. Minimal pain and lochia WNL. States mood has been stable. Reports good family support. Breast pumping q 3 hrs to freeze for NB in NICU.  Reports transportation issues still hindering her ability to visit NB d/t single car in household, childcare needs for 10 month old older child and FO's work schedule. She is able to visit in evenings following FO's end of work day.     Baby remains in NICU for respiratory failure. Was extubated on DOL 9 and is currently on 23% O2 by CPAP.     No community resource needs at present. Updated resources provided via travayl message. RN from call center to f/u next week. Contact information provided. Encouraged to call with questions, concerns, or for support before then.    Lisa Ash RN  Maternity Nurse Navigator    2025, 13:49 EDT

## 2025-04-28 ENCOUNTER — PATIENT OUTREACH (OUTPATIENT)
Dept: CALL CENTER | Facility: HOSPITAL | Age: 25
End: 2025-04-28
Payer: MEDICAID

## 2025-04-28 NOTE — OUTREACH NOTE
Motherhood Connection Survey      Flowsheet Row Responses   [facility] facility patient discharged from? [place]   Week 1 attempt successful? Yes   Call start time 1541   Baby sex Boy    discharged home with mother? Infant in ICU   Comment infant still in NICU   Baby sex Boy   Delivery type Vaginal   Emotional state Acceptance   Emotional State Comment denies any anxiety or depression   Family support Yes   Do you have all necessary resources to care for you and your baby?  Yes   Have members of your household adjusted to your baby? --  [baby not home yet]   Did you have any problems with pre-eclampsia during this pregnancy? Yes   Do you have any of the following: No Issues   Did you have blood glucose issues during this pregnancy No   Lochia amount Light   Lochia per patient report Serosa   Did you have an episiotomy/tear/abdominal incision? No   Additional comments Mom still on Labetalol   Breast Condition No   Nipple Condition No   Nursing Interventions Supportive bra   Umbilical Cord --  [didn't ask]   Was the baby circumcised? No   Mom appointment comments: Mom has F/U appointments scheduled   Baby appointment comments: Baby still in NICU   Feeding method comment infant in NICU.  Mother states she is pumping and dumping her milk.  Unsure how to get milk to NICU for infant.  Advised to ask next time she goes to visit infant. I called [facility] and talked to baby's nurse and they will help with how to store and bring in milk.              Poppy KYLE - Registered Nurse

## 2025-05-06 NOTE — LETTER
"2025     Trenton Mars MD  [address]  [place] [address]    Patient: Huyen Interiano   YOB: 2000   Date of Visit: 3/4/2025     Dear Trenton Mars MD:       Thank you for referring Huyen Interiano to me for evaluation. Below are the relevant portions of my assessment and plan of care.    If you have questions, please do not hesitate to call me. I look forward to following Huyen along with you.         Sincerely,        Douglas A. Milligan, MD        CC: No Recipients    Milligan, Douglas A, MD  [phone]  Sign when Signing Visit  Documentation of the ultrasound findings, images, and interpretations will be available in the patient's Viewpoint report which is located in the imaging tab in chart review.    Maternal/Fetal Medicine Follow Up  Note     Name: Huyen Interiano    : 2000     MRN: [id number]     Referring Provider: Trenton Mars MD    Chief Complaint  IUGR; Hx GHTN; Increase UA dops; short interval b/t preg    Subjective     History of Present Illness:  Huyen Interiano is a 24 y.o.  31w4d who presents today for IUGR    VELMA: Estimated Date of Delivery: 25     ROS:   As noted in HPI.     Objective     Vital Signs  /71   Wt 78.5 kg (173 lb)   LMP 2024   Estimated body mass index is 29.7 kg/m² as calculated from the following:    Height as of 25: 162.6 cm (64\").    Weight as of this encounter: 78.5 kg (173 lb).    Physical Exam    Ultrasound Impression:   See Viewpoint    Assessment and Plan     Huyen Interiano is a 24 y.o.  31w4d who presents today for IUGR    Diagnoses and all orders for this visit:    1. Short interval between pregnancies affecting pregnancy in first trimester, antepartum (Primary)  -     [facility]; Future    2. History of gestational hypertension  -     [facility]; Future    3. Elevated BP without diagnosis of " hypertension  -     [facility]; Future    4. Fetal growth restriction antepartum  Assessment & Plan:  Patient returns today for pregnancy complicated by gestational hypertension and growth lag noted on prior scans.  Patient's most recent scan with us demonstrated overall growth at the 39th percentile with abdominal circumference at the 13th percentile.  Patient notes no complications since her last visit and notes good fetal movement.    Ultrasound today demonstrates a fetus with overall normal growth but with the abdominal circumference at approximately the 8th percentile.  Amniotic fluid volume and umbilical artery Dopplers are normal.  BPP is 8 out of 8.    Patient evidences probable mild intrauterine growth restriction.  We will rescan the patient again in 2 weeks time to reassess fetal growth and umbilical artery Dopplers.  As the umbilical artery Dopplers are now been persistently normal and the amniotic fluid volume is normal we would recommend twice-weekly and  testing at Dr. Wayne's office.  We have an recommended that the patient increase nutrition and the patient notes that she does not smoke or vape.    Ordinarily we would recommend delivery at 39 weeks of the abdominal circumference stays above the 5th percentile and fetal weight remains good.  However, with gestational hypertension the recommendation is for delivery at 37 weeks gestation if no other complications arise.    Patient was counseled extensively regarding fetal movement will contact her provider immediately if she notices any decreased fetal movement.    Orders:  -     [facility]; Future    5. Gestational hypertension without significant proteinuria in second trimester  -     [facility]; Future         Follow Up  Return in about 2 weeks (around 3/18/2025).    I spent 20 minutes caring for the patient on the day of service. This included: obtaining or reviewing a  separately obtained medical history, reviewing patient records, performing a medically appropriate exam and/or evaluation, counseling or educating the patient/family/caregiver, ordering medications, labs, and/or procedures and documenting such in the medical record. This does not include time spent on review and interpretation of other tests such as fetal ultrasound or the performance of other procedures such as amniocentesis or CVS.      Douglas A. Milligan, MD  Maternal Fetal Medicine, [facility]     2025        Opt out

## 2025-05-23 ENCOUNTER — PRIOR AUTHORIZATION (OUTPATIENT)
Dept: OBSTETRICS AND GYNECOLOGY | Facility: CLINIC | Age: 25
End: 2025-05-23

## 2025-05-23 ENCOUNTER — POSTPARTUM VISIT (OUTPATIENT)
Dept: OBSTETRICS AND GYNECOLOGY | Facility: CLINIC | Age: 25
End: 2025-05-23
Payer: MEDICAID

## 2025-05-23 VITALS
HEIGHT: 66 IN | SYSTOLIC BLOOD PRESSURE: 124 MMHG | WEIGHT: 165.8 LBS | DIASTOLIC BLOOD PRESSURE: 76 MMHG | BODY MASS INDEX: 26.65 KG/M2

## 2025-05-23 RX ORDER — ZURANOLONE 25 MG/1
2 CAPSULE ORAL NIGHTLY
Qty: 28 CAPSULE | Refills: 0 | Status: SHIPPED | OUTPATIENT
Start: 2025-05-23 | End: 2025-06-06

## 2025-05-23 NOTE — PROGRESS NOTES
"Subjective   Chief Complaint   Patient presents with    Postpartum Care     6w0d postpartum vaginal delivery / chest pain started 2 days ago (comes and goes). Pain scale 4. / Headaches off and on (tylenol does not help)     Huyen Interiano is a 24 y.o. year old  presenting to be seen for postpartum visit.     History of Present Illness  Patient is 6 weeks postpartum from vaginal delivery. Pregnancy complicated by FGR, GHTN, short interval pregnancy and factor v leiden heterozygous. She is overall feeling okay. Does have some concerns today. She reports intermittent chest pain, initially attributed to breastfeeding as it worsened when she would breastfeed, but now described as a shocking sensation that intensifies with respiration. The pain is alleviated by rest and relaxation and goes away as soon as she is able to relax.  She does not report any associated swelling. She has stopped her antihypertensive medication regimen.    She has been experiencing headaches, which are effectively managed with Tylenol. She has not yet tried ibuprofen.She has not yet initiated birth control measures and has not been sexually active. She expresses a preference for oral contraceptive pills (plan for progesterone only). She does not report any vaginal issues and believes her postpartum recovery is progressing well. She is currently exclusively breastfeeding and has not resumed her menstrual cycle.      She acknowledges the presence of postpartum depression but notes a slight improvement in her symptoms. She is interested in starting a medication for postpartum depression.     [place] of 10      Objective   /76   Ht 167.6 cm (65.98\")   Wt 75.2 kg (165 lb 12.8 oz)   LMP 2024   Breastfeeding Yes   BMI 26.77 kg/m²     General:  well developed; well nourished  no acute distress  mentation appropriate   Skin:  No suspicious lesions seen   Thyroid: not examined   Breasts:  Not performed.   Abdomen: Not " performed.   Pelvis: Not performed.       Assessment & Plan  1. Chest pain  - Suspect secondary to anxiety since it improves with any relaxation   - Patient to call with any worsening symptoms or if persistent     2. Headaches.  - Try ibuprofen for additional relief    3. Postpartum depression.  - Prescription for Zuranolone issued and will be mailed to residence  - Discussed potential side effects including sleepiness and driving restriction    4. Contraception.  - Prescription for POP oral contraceptive pills sent to pharmacy      No orders of the defined types were placed in this encounter.         This note was electronically signed.          Patient or patient representative verbalized consent for the use of Ambient Listening during the visit with  Valerie Martins MD for chart documentation. 5/23/2025  15:07 EDT      Follow up for annual exam.     Valerie Martins MD  Obstetrics and Gynecology  [facility]

## 2025-05-27 ENCOUNTER — TELEPHONE (OUTPATIENT)
Dept: OBSTETRICS AND GYNECOLOGY | Facility: CLINIC | Age: 25
End: 2025-05-27
Payer: MEDICAID

## 2025-05-27 NOTE — TELEPHONE ENCOUNTER
Called pt to inform them that the PA for Zurzuvae was approved, and provided the phone number to the specialty pharmacy for her to call so that they can send the medication to her. Pt expressed understanding.

## 2025-06-27 ENCOUNTER — TELEMEDICINE (OUTPATIENT)
Dept: FAMILY MEDICINE CLINIC | Facility: CLINIC | Age: 25
End: 2025-06-27
Payer: MEDICAID

## 2025-06-27 DIAGNOSIS — F41.9 ANXIETY: ICD-10-CM

## 2025-06-27 DIAGNOSIS — O13.3 GESTATIONAL HYPERTENSION, THIRD TRIMESTER: Primary | ICD-10-CM

## 2025-06-27 RX ORDER — LABETALOL 100 MG/1
100 TABLET, FILM COATED ORAL EVERY 8 HOURS
Qty: 120 TABLET | Refills: 0 | Status: SHIPPED | OUTPATIENT
Start: 2025-06-27

## 2025-06-27 NOTE — PROGRESS NOTES
You have chosen to receive care through a telehealth visit.  Do you consent to use a video/audio connection for your medical care today? Yes     Chief Complaint  Huyen Interiano is a 25 y.o. female who presents via video-conference for seth letter.     History of Present Illness      She would like a letter to obtain a cat as an seth. She would like this to help with anxiety. She feels she has been doing better and would like to avoid medications.     She was placed on blood pressure medication by obgyn. She does not check her bp at home so she is unsure her readings currently. She ran out of bp medication. She reports feeling okay on it.     The following portions of the patient's history were reviewed and updated as appropriate: allergies, current medications, past family history, past medical history, past social history, past surgical history, and problem list.      Objective  Vital signs available: No      Assessment/Plan   Anxiety  Gestational htn      SETH recommended. She wants to avoid anxiety meds. She is considering therapy but wants to try SETH first.   Continue labetalol. Instructed patient to check bp daily and write down to bring to next visit. Counseled normal bp should be 120s over 80s as goal. Advised to seek care for any dizziness.     I asked her to follow up with me with bp readings in one week.       No follow-ups on file.    Diana Dorado D.O.  [facility] [place]     15 minutes were spent reviewing the patient's questionnaire, formulating a treatment plan, and relaying information to the patient via Stupil.

## 2025-06-27 NOTE — LETTER
June 27, 2025     Huyen Interiano    Patient: Huyen Interiano   YOB: 2000   Date of Visit: 6/27/2025     To Whom it May Concern,     I have recommended that Huyen Interiano obtain an emotional support cat to aid in her medical care.     Sincerely,        Diana Dorado DO        CC: No Recipients

## 2025-07-07 DIAGNOSIS — F41.9 ANXIETY: Primary | ICD-10-CM

## 2025-07-31 ENCOUNTER — OFFICE VISIT (OUTPATIENT)
Dept: FAMILY MEDICINE CLINIC | Facility: CLINIC | Age: 25
End: 2025-07-31
Payer: MEDICAID

## 2025-07-31 VITALS
WEIGHT: 169.2 LBS | SYSTOLIC BLOOD PRESSURE: 130 MMHG | HEART RATE: 102 BPM | DIASTOLIC BLOOD PRESSURE: 90 MMHG | OXYGEN SATURATION: 98 % | HEIGHT: 66 IN | RESPIRATION RATE: 18 BRPM | BODY MASS INDEX: 27.19 KG/M2 | TEMPERATURE: 98.6 F

## 2025-07-31 DIAGNOSIS — N92.6 MISSED PERIOD: Primary | ICD-10-CM

## 2025-07-31 PROCEDURE — 1126F AMNT PAIN NOTED NONE PRSNT: CPT | Performed by: STUDENT IN AN ORGANIZED HEALTH CARE EDUCATION/TRAINING PROGRAM

## 2025-07-31 PROCEDURE — 1160F RVW MEDS BY RX/DR IN RCRD: CPT | Performed by: STUDENT IN AN ORGANIZED HEALTH CARE EDUCATION/TRAINING PROGRAM

## 2025-07-31 PROCEDURE — 1159F MED LIST DOCD IN RCRD: CPT | Performed by: STUDENT IN AN ORGANIZED HEALTH CARE EDUCATION/TRAINING PROGRAM

## 2025-07-31 PROCEDURE — 99214 OFFICE O/P EST MOD 30 MIN: CPT | Performed by: STUDENT IN AN ORGANIZED HEALTH CARE EDUCATION/TRAINING PROGRAM

## 2025-07-31 RX ORDER — DULOXETIN HYDROCHLORIDE 30 MG/1
30 CAPSULE, DELAYED RELEASE ORAL DAILY
Qty: 60 CAPSULE | Refills: 0 | Status: SHIPPED | OUTPATIENT
Start: 2025-07-31

## 2025-07-31 NOTE — PROGRESS NOTES
"Chief Complaint  Hypertension (Blood pressure and mental health visit)    Hypertension          Htn  She has stopped the labetalol as her bp at home is 116/70.       Mental health  The patient reports getting angry easy and not having motivation to do anything. She has been having connecting with her baby since getting home from the nicu. She reports feeling better, but having issues \"flip flopping\" and feeling as though she cannot feel a connection to her baby. She has an apt with therapy Aug 1st. She reports the sertraline made her drowsy and did not work. She reports feeling anxiety a lot and worrying her kids will get hurt. She reports this used to be health worries about herself. She denies any thoughts of self harm. She does report lately she has been feeling more connected to her baby and wishes to keep the baby. She reports not having interest in things she used to and sleep issues. She never started the buspar given pregnancy occurred.   She is not breastfeeding. Last period 40 days ago. No other complaints today.       The following portions of the patient's history were reviewed and updated as appropriate: allergies, current medications, past family history, past medical history, past social history, past surgical history, and problem list.    OBJECTIVE:  /90 (BP Location: Right arm, Patient Position: Sitting, Cuff Size: Adult)   Pulse 102   Temp 98.6 °F (37 °C) (Temporal)   Resp 18   Ht 167.6 cm (65.98\")   Wt 76.7 kg (169 lb 3.2 oz)   SpO2 98%   BMI 27.33 kg/m²       Physical Exam  Constitutional:       General: She is not in acute distress.     Appearance: Normal appearance.   HENT:      Head: Normocephalic and atraumatic.   Eyes:      Extraocular Movements: Extraocular movements intact.   Cardiovascular:      Rate and Rhythm: Normal rate and regular rhythm.      Heart sounds: No murmur heard.  Pulmonary:      Effort: Pulmonary effort is normal. No respiratory distress.      Breath sounds: " Normal breath sounds. No stridor. No wheezing, rhonchi or rales.   Skin:     Findings: No rash.   Neurological:      General: No focal deficit present.      Mental Status: She is alert.   Psychiatric:         Mood and Affect: Mood normal.                    Assessment and Plan   Diagnoses and all orders for this visit:    1. Missed period (Primary)  -     HCG, B-subunit, Quantitative; Future    Other orders  -     DULoxetine (CYMBALTA) 30 MG capsule; Take 1 capsule by mouth Daily.  Dispense: 60 capsule; Refill: 0        Given depression with anxiety and lack of response to ssri we will start on cymbalta. I encouraged her to go to her counseling appointment.   I discussed with the patient that this medication can be associated with common side effects such as headache, dizziness, nausea, mood changes.   Discussed with the patient this med has other side effects such as suicidal thoughts, depressive symptoms or elevated mood, insomnia, abnormal dreams. The patient will seek care if these occur.   It cannot be stopped abruptly.   Avoid with alcohol.   Avoid if pregnancy occurs.      I let her know she's welcome to reach out if she has any s/e from the medication.     Return in about 1 month (around 8/31/2025).       Diana Dorado D.O.  [facility] [place]

## 2025-08-21 ENCOUNTER — TELEMEDICINE (OUTPATIENT)
Age: 25
End: 2025-08-21
Payer: MEDICAID

## 2025-08-21 DIAGNOSIS — F41.9 ANXIETY: Primary | ICD-10-CM
